# Patient Record
Sex: FEMALE | Race: BLACK OR AFRICAN AMERICAN | NOT HISPANIC OR LATINO | Employment: FULL TIME | URBAN - METROPOLITAN AREA
[De-identification: names, ages, dates, MRNs, and addresses within clinical notes are randomized per-mention and may not be internally consistent; named-entity substitution may affect disease eponyms.]

---
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
## 2018-05-01 ENCOUNTER — RADIANT APPOINTMENT (OUTPATIENT)
Dept: GENERAL RADIOLOGY | Facility: CLINIC | Age: 18
End: 2018-05-01
Attending: PEDIATRICS
Payer: COMMERCIAL

## 2018-05-01 ENCOUNTER — OFFICE VISIT (OUTPATIENT)
Dept: FAMILY MEDICINE | Facility: CLINIC | Age: 18
End: 2018-05-01
Payer: COMMERCIAL

## 2018-05-01 ENCOUNTER — PATIENT OUTREACH (OUTPATIENT)
Dept: FAMILY MEDICINE | Facility: CLINIC | Age: 18
End: 2018-05-01

## 2018-05-01 VITALS
HEART RATE: 89 BPM | TEMPERATURE: 99 F | HEIGHT: 63 IN | BODY MASS INDEX: 23.53 KG/M2 | WEIGHT: 132.8 LBS | OXYGEN SATURATION: 99 % | SYSTOLIC BLOOD PRESSURE: 137 MMHG | DIASTOLIC BLOOD PRESSURE: 78 MMHG

## 2018-05-01 DIAGNOSIS — R10.84 ABDOMINAL PAIN, GENERALIZED: Primary | ICD-10-CM

## 2018-05-01 DIAGNOSIS — R10.84 ABDOMINAL PAIN, GENERALIZED: ICD-10-CM

## 2018-05-01 DIAGNOSIS — N30.01 ACUTE CYSTITIS WITH HEMATURIA: ICD-10-CM

## 2018-05-01 DIAGNOSIS — R82.90 ABNORMAL URINE FINDINGS: ICD-10-CM

## 2018-05-01 DIAGNOSIS — K59.00 CONSTIPATION, UNSPECIFIED CONSTIPATION TYPE: ICD-10-CM

## 2018-05-01 LAB
ALBUMIN UR-MCNC: 100 MG/DL
AMORPH CRY #/AREA URNS HPF: ABNORMAL /HPF
APPEARANCE UR: ABNORMAL
BACTERIA #/AREA URNS HPF: ABNORMAL /HPF
BETA HCG QUAL IFA URINE: NEGATIVE
BILIRUB UR QL STRIP: NEGATIVE
COLOR UR AUTO: YELLOW
GLUCOSE UR STRIP-MCNC: NEGATIVE MG/DL
HGB UR QL STRIP: ABNORMAL
KETONES UR STRIP-MCNC: NEGATIVE MG/DL
LEUKOCYTE ESTERASE UR QL STRIP: ABNORMAL
NITRATE UR QL: NEGATIVE
NON-SQ EPI CELLS #/AREA URNS LPF: ABNORMAL /LPF
PH UR STRIP: 8 PH (ref 5–7)
RBC #/AREA URNS AUTO: ABNORMAL /HPF
SOURCE: ABNORMAL
SP GR UR STRIP: 1.02 (ref 1–1.03)
TSH SERPL DL<=0.005 MIU/L-ACNC: 0.67 MU/L (ref 0.4–4)
UROBILINOGEN UR STRIP-ACNC: 1 EU/DL (ref 0.2–1)
WBC #/AREA URNS AUTO: ABNORMAL /HPF

## 2018-05-01 PROCEDURE — 84443 ASSAY THYROID STIM HORMONE: CPT | Performed by: PEDIATRICS

## 2018-05-01 PROCEDURE — 84703 CHORIONIC GONADOTROPIN ASSAY: CPT | Performed by: PEDIATRICS

## 2018-05-01 PROCEDURE — 81001 URINALYSIS AUTO W/SCOPE: CPT | Performed by: PEDIATRICS

## 2018-05-01 PROCEDURE — 74019 RADEX ABDOMEN 2 VIEWS: CPT | Mod: FY

## 2018-05-01 PROCEDURE — 99203 OFFICE O/P NEW LOW 30 MIN: CPT | Performed by: PEDIATRICS

## 2018-05-01 PROCEDURE — 87186 SC STD MICRODIL/AGAR DIL: CPT | Performed by: PEDIATRICS

## 2018-05-01 PROCEDURE — 87088 URINE BACTERIA CULTURE: CPT | Performed by: PEDIATRICS

## 2018-05-01 PROCEDURE — 36415 COLL VENOUS BLD VENIPUNCTURE: CPT | Performed by: PEDIATRICS

## 2018-05-01 PROCEDURE — 87086 URINE CULTURE/COLONY COUNT: CPT | Performed by: PEDIATRICS

## 2018-05-01 RX ORDER — CEPHALEXIN 500 MG/1
500 CAPSULE ORAL 2 TIMES DAILY
Qty: 20 CAPSULE | Refills: 0 | Status: SHIPPED | OUTPATIENT
Start: 2018-05-01 | End: 2018-07-31

## 2018-05-01 RX ORDER — POLYETHYLENE GLYCOL 3350 17 G/17G
1 POWDER, FOR SOLUTION ORAL DAILY
Qty: 510 G | Refills: 1 | Status: SHIPPED | OUTPATIENT
Start: 2018-05-01 | End: 2018-07-31

## 2018-05-01 ASSESSMENT — PAIN SCALES - GENERAL: PAINLEVEL: MODERATE PAIN (5)
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
## 2018-05-01 NOTE — PATIENT INSTRUCTIONS
At [facility], we strive to deliver an exceptional experience to you, every time we see you.  If you receive a survey in the mail, please send us back your thoughts. We really do value your feedback.    Based on your medical history, these are the current health maintenance/preventive care services that you are due for (some may have been done at this visit.)  Health Maintenance Due   Topic Date Due     HIV SCREEN (SYSTEM ASSIGNED)  02/14/2016       Suggested websites for health information:  Www.Hyper Wear.org : Up to date and easily searchable information on multiple topics.  Www.Kamelio.gov : medication info, interactive tutorials, watch real surgeries online  Www.familydoctor.org : good info from the Academy of Family Physicians  Www.cdc.gov : public health info, travel advisories, epidemics (H1N1)  Www.aap.org : children's health info, normal development, vaccinations  Www.health.[facility].us : [place] dept of health, public health issues in [place], N1N1    Your care team:                            Family Medicine Internal Medicine   MD Randall Espino MD Shantel Branch-Fleming, MD Katya Georgiev PA-C Megan Hill, APRN CNP    Berry Burns MD Pediatrics   Kike Mojica, PAFernandoC  Gogo Aguilar, CNP MD Xiomy Pahn APRN CNP   MD Linda Matta MD Deborah Mielke, MD Kim Thein, APRN CNP      Clinic hours: Monday - Thursday 7 am-7 pm; Fridays 7 am-5 pm.   Urgent care: Monday - Friday 11 am-9 pm; Saturday and Sunday 9 am-5 pm.  Pharmacy : Monday -Thursday 8 am-8 pm; Friday 8 am-6 pm; Saturday and Sunday 9 am-5 pm.     Clinic: [phone]   Pharmacy: [phone]      Constipation (Adult)  Constipation means that you have bowel movements that are less frequent than usual. Stools often become very hard and difficult to pass.  Constipation is very common. At some point in life it affects almost everyone. Since everyone's bowel habits are different,  what is constipation to one person may not be to another. Your healthcare provider may do tests to diagnose constipation. It depends on what he or she finds when evaluating you.    Symptoms of constipation include:    Abdominal pain    Bloating    Vomiting    Painful bowel movements    Itching, swelling, bleeding, or pain around the anus  Causes  Constipation can have many causes. These include:    Diet low in fiber    Too much dairy    Not drinking enough liquids    Lack of exercise or physical activity. This is especially true for older adults.    Changes in lifestyle or daily routine, including pregnancy, aging, work, and travel    Frequent use or misuse of laxatives    Ignoring the urge to have a bowel movement or delaying it until later    Medicines, such as certain prescription pain medicines, iron supplements, antacids, certain antidepressants, and calcium supplements    Diseases like irritable bowel syndrome, bowel obstructions, stroke, diabetes, thyroid disease, Parkinson disease, hemorrhoids, and colon cancer  Complications  Potential complications of constipation can include:    Hemorrhoids    Rectal bleeding from hemorrhoids or anal fissures (skin tears)    Hernias    Dependency on laxatives    Chronic constipation    Fecal impaction    Bowel obstruction or perforation  Home care  All treatment should be done after talking with your healthcare provider. This is especially true if you have another medical problems, are taking prescription medicines, or are an older adult. Treatment most often involves lifestyle changes. You may also need medicines. Your healthcare provider will tell you which will work best for you. Follow the advice below to help avoid this problem in the future.  Lifestyle changes  These lifestyle changes can help prevent constipation:    Diet. Eat a high-fiber diet, with fresh fruit and vegetables, and reduce dairy intake, meats, and processed foods    Fluids. It's important to get  enough fluids each day. Drink plenty of water when you eat more fiber. If you are on diet that limits the amount of fluid you can have, talk about this with your healthcare provider.    Regular exercise. Check with your healthcare provider first.  Medicines  Take any medicines as directed. Some laxatives are safe to use only every now and then. Others can be taken on a regular basis. Talk with your doctor or pharmacist if you have questions.  Prescription pain medicines can cause constipation. If you are taking this kind of medicine, ask your healthcare provider if you should also take a stool softener.  Medicines you may take to treat constipation include:    Fiber supplements    Stool softeners    Laxatives    Enemas    Rectal suppositories  Follow-up care  Follow up with your healthcare provider if symptoms don't get better in the next few days. You may need to have more tests or see a specialist.  Call 911  Call 911 if any of these occur:    Trouble breathing    Stiff, rigid abdomen that is severely painful to touch    Confusion    Fainting or loss of consciousness    Rapid heart rate    Chest pain  When to seek medical advice  Call your healthcare provider right away if any of these occur:    Fever of 100.4 F (38 C) or higher, or as directed by your healthcare provider    Failure to resume normal bowel movements    Pain in your abdomen or back gets worse    Nausea or vomiting    Swelling in your abdomen    Blood in the stool    Black, tarry stool    Involuntary weight loss    Weakness  Date Last Reviewed: 12/30/2015 2000-2017 [employer]. [address] [place] [address]. All rights reserved. This information is not intended as a substitute for professional medical care. Always follow your healthcare professional's instructions.        Understanding Urinary Tract Infections (UTIs)  Most UTIs are caused by bacteria, although they may also be caused by viruses or fungi. Bacteria from the  bowel are the most common source of infection. The infection may start because of any of the following:    Sexual activity. During sex, bacteria can travel from the penis, vagina, or rectum into the urethra.     Bacteria on the skin outside the rectum may travel into the urethra. This is more common in women since the rectum and urethra are closer to each other than in men. Wiping from front to back after using the toilet and keeping the area clean can help prevent germs from getting to the urethra.    Blockage of urine flow through the urinary tract. If urine sits too long, germs may start to grow out of control.      Parts of the urinary tract  The infection can occur in any part of the urinary tract.    The kidneys collect and store urine.    The ureters carry urine from the kidneys to the bladder.    The bladder holds urine until you are ready to let it out.    The urethra carries urine from the bladder out of the body. It is shorter in women, so bacteria can move through it more easily. The urethra is longer in men, so a UTI is less likely to reach the bladder or kidneys in men.  Date Last Reviewed: 1/1/2017 2000-2017 [employer]. [address] [place] [address]. All rights reserved. This information is not intended as a substitute for professional medical care. Always follow your healthcare professional's instructions.

## 2018-05-01 NOTE — LETTER
Miranda CARE COORDINATION  [facility]  [address] [place] [address]    May 1, 2018    Cindy Xie  [address]   [place] [address]      Dear Cindy,    I am a clinic care coordinator who works with  Dr.Paula Marshall MD at the [facility]. I wanted to thank you for spending the time to talk with me. When you were in the clinic.  I wanted to provide you with my contact information so that you can call me with questions or concerns about your health care. Below is a description of clinic care coordination and how I can further assist you.     The clinic care coordinator is a registered nurse and/or  who understand the health care system. The goal of clinic care coordination is to help you manage your health and improve access to the [facility] system in the most efficient manner. The registered nurse can assist you in meeting your health care goals by providing education, coordinating services, and strengthening the communication among your providers. The  can assist you with financial, behavioral, psychosocial, chemical dependency, counseling, and/or psychiatric resources.    Please feel free to contact me at [phone], with any questions or concerns. We at [facility] are focused on providing you with the highest-quality healthcare experience possible and that all starts with you.     Sincerely,     Kimberly Rice [facility]  , Clinic Care Coordination  Clinics:  [facility] Rogers, [place]  [phone]   5/1/2018   1:58 PM    Enclosed: I have enclosed a copy of a 24 Hour Access Plan. This has helpful phone numbers for you to call when needed. Please keep this in an easy to access place to use as needed.

## 2018-05-01 NOTE — LETTER
Health Care Home - Access Care Plan    About Me  Patient Name:  Cindy Conroy    YOB: 2000  Age:                             18 year old   Sasha MRN:            [id number] Telephone Information:     Home Phone [phone]   Mobile [phone]       Address:    [address]   [place] [address] Email address:  No e-mail address on record      Emergency Contact(s)  Name Relationship Lgl Grd Work Phone Home Phone Mobile Phone   1. SHANE CONROY Mother   [phone]   2. NONE PER MOTHER Other   [phone]              Health Maintenance: Routine Health maintenance Reviewed: Due/Overdue     My Access Plan  Medical Emergency 911   Questions or concerns during clinic hours Primary Clinic Line, I will call the clinic directly: [facility] - [phone]   24 Hour Appointment Line [phone] or  [phone] [phone]) (toll free)   24 Hour Nurse Line [phone] (toll free)   Questions or concerns outside clinic hours 24 Hour Appointment Line, I will call the after-hours on-call line:   [facility] [phone] or [phone] ([phone]) (toll-free)   Preferred Urgent Care     Preferred Hospital     Preferred Pharmacy [facility] [address] [place], [place] - [address] [facility] [facility] [address] [facility]     Behavioral Health Crisis Line The National Suicide Prevention Lifeline at 1-924.843.1426 or 148     My Care Team Members  Patient Care Team       Relationship Specialty Notifications Start End    Branch Marjorie Duff MD PCP - General Family Practice  9/24/10     Phone: [phone] Fax: [phone]         [address] [place] [facility] [address]    Cindy Rice LSW (Peggy Johnson (LSW) Clinic Care Coordinator Primary Care - CC  5/1/18     Phone: [phone] Fax: [phone]               My Medical and Care Information  Problem List   Patient Active Problem List   Diagnosis     Health  Care Home     Suicide attempt using analgesics (H)     Family history of liver failure      Current Medications and Allergies:  See printed Medication Report
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
## 2018-05-01 NOTE — MR AVS SNAPSHOT
After Visit Summary   5/1/2018    Cindy Xie    MRN: [id number]           Patient Information     Date Of Birth          2000        Visit Information        Provider Department      5/1/2018 10:20 AM Maryam Olivares MD [facility]        Today's Diagnoses     Abdominal pain, generalized    -  1    Abnormal urine findings        Acute cystitis with hematuria        Constipation, unspecified constipation type          Care Instructions    At [facility], we strive to deliver an exceptional experience to you, every time we see you.  If you receive a survey in the mail, please send us back your thoughts. We really do value your feedback.    Based on your medical history, these are the current health maintenance/preventive care services that you are due for (some may have been done at this visit.)  Health Maintenance Due   Topic Date Due     HIV SCREEN (SYSTEM ASSIGNED)  02/14/2016       Suggested websites for health information:  WwwTangerine Power : Up to date and easily searchable information on multiple topics.  Www.medlineplus.gov : medication info, interactive tutorials, watch real surgeries online  Www.familydoctor.org : good info from the Academy of Family Physicians  Www.cdc.gov : public health info, travel advisories, epidemics (H1N1)  Www.aap.org : children's health info, normal development, vaccinations  Www.health.state.[place].us : [place] dept of health, public health issues in [place], N1N1    Your care team:                            Family Medicine Internal Medicine   MD Randall Espino MD Shantel Branch-Fleming, MD Katya Georgiev PA-C Megan Hill, LEEANN Burns MD Pediatrics   KENISHA Royal, MD Xiomy Chambers APRN CNP   MD Linda Matta MD Deborah Mielke, MD Kim Thein, APRN [facility]      Clinic hours: Monday - Thursday 7 am-7 pm; Fridays 7 am-5 pm.   Urgent care: Monday -  Friday 11 am-9 pm; Saturday and Sunday 9 am-5 pm.  Pharmacy : Monday -Thursday 8 am-8 pm; Friday 8 am-6 pm; Saturday and Sunday 9 am-5 pm.     Clinic: [phone]   Pharmacy: [phone]      Constipation (Adult)  Constipation means that you have bowel movements that are less frequent than usual. Stools often become very hard and difficult to pass.  Constipation is very common. At some point in life it affects almost everyone. Since everyone's bowel habits are different, what is constipation to one person may not be to another. Your healthcare provider may do tests to diagnose constipation. It depends on what he or she finds when evaluating you.    Symptoms of constipation include:    Abdominal pain    Bloating    Vomiting    Painful bowel movements    Itching, swelling, bleeding, or pain around the anus  Causes  Constipation can have many causes. These include:    Diet low in fiber    Too much dairy    Not drinking enough liquids    Lack of exercise or physical activity. This is especially true for older adults.    Changes in lifestyle or daily routine, including pregnancy, aging, work, and travel    Frequent use or misuse of laxatives    Ignoring the urge to have a bowel movement or delaying it until later    Medicines, such as certain prescription pain medicines, iron supplements, antacids, certain antidepressants, and calcium supplements    Diseases like irritable bowel syndrome, bowel obstructions, stroke, diabetes, thyroid disease, Parkinson disease, hemorrhoids, and colon cancer  Complications  Potential complications of constipation can include:    Hemorrhoids    Rectal bleeding from hemorrhoids or anal fissures (skin tears)    Hernias    Dependency on laxatives    Chronic constipation    Fecal impaction    Bowel obstruction or perforation  Home care  All treatment should be done after talking with your healthcare provider. This is especially true if you have another medical problems, are taking  prescription medicines, or are an older adult. Treatment most often involves lifestyle changes. You may also need medicines. Your healthcare provider will tell you which will work best for you. Follow the advice below to help avoid this problem in the future.  Lifestyle changes  These lifestyle changes can help prevent constipation:    Diet. Eat a high-fiber diet, with fresh fruit and vegetables, and reduce dairy intake, meats, and processed foods    Fluids. It's important to get enough fluids each day. Drink plenty of water when you eat more fiber. If you are on diet that limits the amount of fluid you can have, talk about this with your healthcare provider.    Regular exercise. Check with your healthcare provider first.  Medicines  Take any medicines as directed. Some laxatives are safe to use only every now and then. Others can be taken on a regular basis. Talk with your doctor or pharmacist if you have questions.  Prescription pain medicines can cause constipation. If you are taking this kind of medicine, ask your healthcare provider if you should also take a stool softener.  Medicines you may take to treat constipation include:    Fiber supplements    Stool softeners    Laxatives    Enemas    Rectal suppositories  Follow-up care  Follow up with your healthcare provider if symptoms don't get better in the next few days. You may need to have more tests or see a specialist.  Call 911  Call 911 if any of these occur:    Trouble breathing    Stiff, rigid abdomen that is severely painful to touch    Confusion    Fainting or loss of consciousness    Rapid heart rate    Chest pain  When to seek medical advice  Call your healthcare provider right away if any of these occur:    Fever of 100.4 F (38 C) or higher, or as directed by your healthcare provider    Failure to resume normal bowel movements    Pain in your abdomen or back gets worse    Nausea or vomiting    Swelling in your abdomen    Blood in the stool    Black,  tarry stool    Involuntary weight loss    Weakness  Date Last Reviewed: 12/30/2015 2000-2017 [employer]. [address] [place] [address]. All rights reserved. This information is not intended as a substitute for professional medical care. Always follow your healthcare professional's instructions.        Understanding Urinary Tract Infections (UTIs)  Most UTIs are caused by bacteria, although they may also be caused by viruses or fungi. Bacteria from the bowel are the most common source of infection. The infection may start because of any of the following:    Sexual activity. During sex, bacteria can travel from the penis, vagina, or rectum into the urethra.     Bacteria on the skin outside the rectum may travel into the urethra. This is more common in women since the rectum and urethra are closer to each other than in men. Wiping from front to back after using the toilet and keeping the area clean can help prevent germs from getting to the urethra.    Blockage of urine flow through the urinary tract. If urine sits too long, germs may start to grow out of control.      Parts of the urinary tract  The infection can occur in any part of the urinary tract.    The kidneys collect and store urine.    The ureters carry urine from the kidneys to the bladder.    The bladder holds urine until you are ready to let it out.    The urethra carries urine from the bladder out of the body. It is shorter in women, so bacteria can move through it more easily. The urethra is longer in men, so a UTI is less likely to reach the bladder or kidneys in men.  Date Last Reviewed: 1/1/2017 2000-2017 [employer]. [address] [place] [address]. All rights reserved. This information is not intended as a substitute for professional medical care. Always follow your healthcare professional's instructions.                Follow-ups after your visit        Who to contact     If you have  "questions or need follow up information about today's clinic visit or your schedule please contact [facility] MARIZA DEJESUS directly at [phone].  Normal or non-critical lab and imaging results will be communicated to you by MyChart, letter or phone within 4 business days after the clinic has received the results. If you do not hear from us within 7 days, please contact the clinic through [employer] or phone. If you have a critical or abnormal lab result, we will notify you by phone as soon as possible.  Submit refill requests through [employer] or call your pharmacy and they will forward the refill request to us. Please allow 3 business days for your refill to be completed.          Additional Information About Your Visit        [employer] Information     [employer] lets you send messages to your doctor, view your test results, renew your prescriptions, schedule appointments and more. To sign up, go to [email or web address] . Click on \"Log in\" on the left side of the screen, which will take you to the Welcome page. Then click on \"Sign up Now\" on the right side of the page.     You will be asked to enter the access code listed below, as well as some personal information. Please follow the directions to create your username and password.     Your access code is: [email or web address]  Expires: 2018 11:33 AM     Your access code will  in 90 days. If you need help or a new code, please call your [facility] clinic or [phone].        Care EveryWhere ID     This is your Care EveryWhere ID. This could be used by other organizations to access your [facility] medical records  [id number]        Your Vitals Were     Pulse Temperature Height Pulse Oximetry BMI (Body Mass Index)       89 99  F (37.2  C) (Oral) 5' 3.39\" (1.61 m) 99% 23.24 kg/m2        Blood Pressure from Last 3 Encounters:   18 137/78   16 100/68   14 121/77    Weight from Last 3 Encounters:   18 132 lb 12.8 oz (60.2 kg)   16 " 136 lb (61.7 kg) (67 %)*   08/19/14 131 lb 6.4 oz (59.6 kg) (69 %)*     * Growth percentiles are based on CDC 2-20 Years data.              We Performed the Following     Beta HCG Qual, Urine - [facility] and [place] ([id number])     TSH with free T4 reflex     UA with Microscopic reflex to Culture     Urine Culture Aerobic Bacterial          Today's Medication Changes          These changes are accurate as of 5/1/18 11:33 AM.  If you have any questions, ask your nurse or doctor.               Start taking these medicines.        Dose/Directions    cephALEXin 500 MG capsule   Commonly known as:  KEFLEX   Used for:  Acute cystitis with hematuria   Started by:  Maryam Olivares MD        Dose:  500 mg   Take 1 capsule (500 mg) by mouth 2 times daily   Quantity:  20 capsule   Refills:  0       polyethylene glycol powder   Commonly known as:  MIRALAX   Used for:  Constipation, unspecified constipation type   Started by:  Maryam Olivares MD        Dose:  1 capful   Take 17 g (1 capful) by mouth daily   Quantity:  510 g   Refills:  1            Where to get your medicines      These medications were sent to [facility] [address] [facility] [address] [facility] [address] [facility]  [address] [facility] [address]    Hours:  24-hours Phone:  [phone]     cephALEXin 500 MG capsule    polyethylene glycol powder                Primary Care Provider Office Phone # Fax #    Marjorie Camargo Abdi Duff [phone]       [address] [facility] [address] [facility] [address]        Equal Access to Services     [facility]: Hadii aad ku hadasho Soomaali, waaxda luqadaha, qaybta kaalmada adeegyada, analilia idiin hayelisa zaragoza . So [facility] [phone].    ATENCIÓN: Si habla español, tiene a espinoza disposición servicios gratuitos de asistencia lingüística. Llame al [phone].    We comply with applicable federal civil rights laws and [place] laws. We do not discriminate on  the basis of race, color, national origin, age, disability, sex, sexual orientation, or gender identity.            Thank you!     Thank you for choosing [facility]  for your care. Our goal is always to provide you with excellent care. Hearing back from our patients is one way we can continue to improve our services. Please take a few minutes to complete the written survey that you may receive in the mail after your visit with us. Thank you!             Your Updated Medication List - Protect others around you: Learn how to safely use, store and throw away your medicines at www.disposemymeds.org.          This list is accurate as of 5/1/18 11:33 AM.  Always use your most recent med list.                   Brand Name Dispense Instructions for use Diagnosis    cephALEXin 500 MG capsule    KEFLEX    20 capsule    Take 1 capsule (500 mg) by mouth 2 times daily    Acute cystitis with hematuria       NEXPLANON SC      Per pt nexplanon was placed Feb 2017        NO ACTIVE MEDICATIONS           polyethylene glycol powder    MIRALAX    510 g    Take 17 g (1 capful) by mouth daily    Constipation, unspecified constipation type       triamcinolone 0.1 % cream    KENALOG    15 g    Apply sparingly to affected area three times daily for 14 days.    Dermatitis

## 2018-05-03 LAB
BACTERIA SPEC CULT: ABNORMAL
SPECIMEN SOURCE: ABNORMAL

## 2018-05-04 ENCOUNTER — TELEPHONE (OUTPATIENT)
Dept: FAMILY MEDICINE | Facility: CLINIC | Age: 18
End: 2018-05-04

## 2018-05-04 NOTE — TELEPHONE ENCOUNTER
Notes Recorded by Maryam Olivares MD on 5/3/2018 at 2:15 PM  Called to discuss UCx results  Left a message  If patient calls could you let her know result and ask if she is better please? If not any better will change to Cipro but since UCx showed sen to Oxacillin, Keflex should work ok. Will change only if not better clinically   thks

## 2018-06-01 ENCOUNTER — PATIENT OUTREACH (OUTPATIENT)
Dept: FAMILY MEDICINE | Facility: CLINIC | Age: 18
End: 2018-06-01
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
## 2018-06-04 ENCOUNTER — OFFICE VISIT (OUTPATIENT)
Dept: FAMILY MEDICINE | Facility: CLINIC | Age: 18
End: 2018-06-04
Payer: COMMERCIAL

## 2018-06-04 VITALS
DIASTOLIC BLOOD PRESSURE: 76 MMHG | RESPIRATION RATE: 16 BRPM | HEIGHT: 63 IN | WEIGHT: 132.5 LBS | BODY MASS INDEX: 23.48 KG/M2 | TEMPERATURE: 98.6 F | HEART RATE: 90 BPM | SYSTOLIC BLOOD PRESSURE: 122 MMHG | OXYGEN SATURATION: 98 %

## 2018-06-04 DIAGNOSIS — B37.31 CANDIDIASIS OF VULVA AND VAGINA: ICD-10-CM

## 2018-06-04 DIAGNOSIS — N30.00 ACUTE CYSTITIS WITHOUT HEMATURIA: Primary | ICD-10-CM

## 2018-06-04 LAB
ALBUMIN UR-MCNC: 100 MG/DL
APPEARANCE UR: ABNORMAL
BACTERIA #/AREA URNS HPF: ABNORMAL /HPF
BILIRUB UR QL STRIP: NEGATIVE
COLOR UR AUTO: YELLOW
GLUCOSE UR STRIP-MCNC: NEGATIVE MG/DL
HGB UR QL STRIP: ABNORMAL
KETONES UR STRIP-MCNC: NEGATIVE MG/DL
LEUKOCYTE ESTERASE UR QL STRIP: ABNORMAL
NITRATE UR QL: NEGATIVE
NON-SQ EPI CELLS #/AREA URNS LPF: ABNORMAL /LPF
PH UR STRIP: 6 PH (ref 5–7)
RBC #/AREA URNS AUTO: ABNORMAL /HPF
SOURCE: ABNORMAL
SP GR UR STRIP: >1.03 (ref 1–1.03)
SPECIMEN SOURCE: ABNORMAL
UROBILINOGEN UR STRIP-ACNC: 1 EU/DL (ref 0.2–1)
WBC #/AREA URNS AUTO: ABNORMAL /HPF
WET PREP SPEC: ABNORMAL

## 2018-06-04 PROCEDURE — 87491 CHLMYD TRACH DNA AMP PROBE: CPT | Performed by: PHYSICIAN ASSISTANT

## 2018-06-04 PROCEDURE — 87210 SMEAR WET MOUNT SALINE/INK: CPT | Performed by: PHYSICIAN ASSISTANT

## 2018-06-04 PROCEDURE — 81001 URINALYSIS AUTO W/SCOPE: CPT | Performed by: PHYSICIAN ASSISTANT

## 2018-06-04 PROCEDURE — 87591 N.GONORRHOEAE DNA AMP PROB: CPT | Performed by: PHYSICIAN ASSISTANT

## 2018-06-04 PROCEDURE — 99213 OFFICE O/P EST LOW 20 MIN: CPT | Performed by: PHYSICIAN ASSISTANT

## 2018-06-04 RX ORDER — FLUCONAZOLE 150 MG/1
150 TABLET ORAL
Qty: 2 TABLET | Refills: 0 | Status: SHIPPED | OUTPATIENT
Start: 2018-06-04 | End: 2018-07-31

## 2018-06-04 RX ORDER — SULFAMETHOXAZOLE/TRIMETHOPRIM 800-160 MG
1 TABLET ORAL 2 TIMES DAILY
Qty: 14 TABLET | Refills: 0 | Status: SHIPPED | OUTPATIENT
Start: 2018-06-04 | End: 2018-06-11

## 2018-06-04 ASSESSMENT — PAIN SCALES - GENERAL: PAINLEVEL: EXTREME PAIN (8)

## 2018-06-04 NOTE — PATIENT INSTRUCTIONS
Please take Bactrim 1 tablet twice a day for 7 days with large amount of water  Increase fluids  Follow up or call in 3 days if not better  Urine culture is pending  For prevention wipe front to back, urinate and wash after sex.     Diflucan 150 mg 1 tablet once, may repeat in 3 days as needed -for yeast    Understanding Urinary Tract Infections (UTIs)  Most UTIs are caused by bacteria, although they may also be caused by viruses or fungi. Bacteria from the bowel are the most common source of infection. The infection may start because of any of the following:    Sexual activity. During sex, bacteria can travel from the penis, vagina, or rectum into the urethra.     Bacteria on the skin outside the rectum may travel into the urethra. This is more common in women since the rectum and urethra are closer to each other than in men. Wiping from front to back after using the toilet and keeping the area clean can help prevent germs from getting to the urethra.    Blockage of urine flow through the urinary tract. If urine sits too long, germs may start to grow out of control.      Parts of the urinary tract  The infection can occur in any part of the urinary tract.    The kidneys collect and store urine.    The ureters carry urine from the kidneys to the bladder.    The bladder holds urine until you are ready to let it out.    The urethra carries urine from the bladder out of the body. It is shorter in women, so bacteria can move through it more easily. The urethra is longer in men, so a UTI is less likely to reach the bladder or kidneys in men.  Date Last Reviewed: 1/1/2017 2000-2017 [employer]. [address] [place] [address], [place] [address]. All rights reserved. This information is not intended as a substitute for professional medical care. Always follow your healthcare professional's instructions.        Yeast Infection (Candida Vaginal Infection)    You have a Candida vaginal infection. This is also known as  a yeast infection. It is most often caused by a type of yeast (fungus) called Candida. Candida are normally found in the vagina. But if they increase in number, this can lead to infection and cause symptoms.  Symptoms of a yeast infection can include:    Clumpy or thin, white discharge, which may look like cottage cheese    Itching or burning    Burning with urination  Certain factors can make a yeast infection more likely. These can include:    Taking certain medicines, such as antibiotics or birth control pills    Pregnancy    Diabetes    Weak immune system  A yeast infection is most often treated with antifungal medicine. This may be given as a vaginal cream or pills you take by mouth. Treatment may last for about 1 to 7 days. Women with severe or recurrent infections may need longer courses of treatment.  Home care    If you re prescribed medicine, be sure to use it as directed. Finish all of the medicine, even if your symptoms go away. Note: Don t try to treat yourself using over-the-counter products without talking to your provider first. He or she will let you know if this is a good option for you.    Ask your provider what steps you can take to help reduce your risk of having a yeast infection in the future.  Follow-up care  Follow up with your healthcare provider, or as directed.  When to seek medical advice  Call your healthcare provider right away if:    You have a fever of 100.4 F (38 C) or higher, or as directed by your provider.    Your symptoms worsen, or they don t go away within a few days of starting treatment.    You have new pain in the lower belly or pelvic region.    You have side effects that bother you or a reaction to the cream or pills you re prescribed.    You or any partners you have sex with have new symptoms, such as a rash, joint pain, or sores.  Date Last Reviewed: 10/1/2017    1531-9910 [employer]. [address] [place] [address], [place] [address]. All rights reserved. This  information is not intended as a substitute for professional medical care. Always follow your healthcare professional's instructions.

## 2018-06-04 NOTE — MR AVS SNAPSHOT
After Visit Summary   6/4/2018    Cidny Xie    MRN: [id number]           Patient Information     Date Of Birth          2000        Visit Information        Provider Department      6/4/2018 10:20 AM Shirley Barreto PA-C [facility]        Today's Diagnoses     Dysuria    -  1    Acute cystitis without hematuria        Candidiasis of vulva and vagina          Care Instructions    Please take Bactrim 1 tablet twice a day for 7 days with large amount of water  Increase fluids  Follow up or call in 3 days if not better  Urine culture is pending  For prevention wipe front to back, urinate and wash after sex.     Diflucan 150 mg 1 tablet once, may repeat in 3 days as needed -for yeast    Understanding Urinary Tract Infections (UTIs)  Most UTIs are caused by bacteria, although they may also be caused by viruses or fungi. Bacteria from the bowel are the most common source of infection. The infection may start because of any of the following:    Sexual activity. During sex, bacteria can travel from the penis, vagina, or rectum into the urethra.     Bacteria on the skin outside the rectum may travel into the urethra. This is more common in women since the rectum and urethra are closer to each other than in men. Wiping from front to back after using the toilet and keeping the area clean can help prevent germs from getting to the urethra.    Blockage of urine flow through the urinary tract. If urine sits too long, germs may start to grow out of control.      Parts of the urinary tract  The infection can occur in any part of the urinary tract.    The kidneys collect and store urine.    The ureters carry urine from the kidneys to the bladder.    The bladder holds urine until you are ready to let it out.    The urethra carries urine from the bladder out of the body. It is shorter in women, so bacteria can move through it more easily. The urethra is longer in men, so a  UTI is less likely to reach the bladder or kidneys in men.  Date Last Reviewed: 1/1/2017 2000-2017 [employer]. [address] [place] [address], [place] [address]. All rights reserved. This information is not intended as a substitute for professional medical care. Always follow your healthcare professional's instructions.        Yeast Infection (Candida Vaginal Infection)    You have a Candida vaginal infection. This is also known as a yeast infection. It is most often caused by a type of yeast (fungus) called Candida. Candida are normally found in the vagina. But if they increase in number, this can lead to infection and cause symptoms.  Symptoms of a yeast infection can include:    Clumpy or thin, white discharge, which may look like cottage cheese    Itching or burning    Burning with urination  Certain factors can make a yeast infection more likely. These can include:    Taking certain medicines, such as antibiotics or birth control pills    Pregnancy    Diabetes    Weak immune system  A yeast infection is most often treated with antifungal medicine. This may be given as a vaginal cream or pills you take by mouth. Treatment may last for about 1 to 7 days. Women with severe or recurrent infections may need longer courses of treatment.  Home care    If you re prescribed medicine, be sure to use it as directed. Finish all of the medicine, even if your symptoms go away. Note: Don t try to treat yourself using over-the-counter products without talking to your provider first. He or she will let you know if this is a good option for you.    Ask your provider what steps you can take to help reduce your risk of having a yeast infection in the future.  Follow-up care  Follow up with your healthcare provider, or as directed.  When to seek medical advice  Call your healthcare provider right away if:    You have a fever of 100.4 F (38 C) or higher, or as directed by your provider.    Your symptoms worsen, or they  "don t go away within a few days of starting treatment.    You have new pain in the lower belly or pelvic region.    You have side effects that bother you or a reaction to the cream or pills you re prescribed.    You or any partners you have sex with have new symptoms, such as a rash, joint pain, or sores.  Date Last Reviewed: 10/1/2017    1329-3093 [employer]. [address] [place] [address]. All rights reserved. This information is not intended as a substitute for professional medical care. Always follow your healthcare professional's instructions.                Follow-ups after your visit        Who to contact     If you have questions or need follow up information about today's clinic visit or your schedule please contact [facility] directly at [phone].  Normal or non-critical lab and imaging results will be communicated to you by [employer], letter or phone within 4 business days after the clinic has received the results. If you do not hear from us within 7 days, please contact the clinic through [employer] or phone. If you have a critical or abnormal lab result, we will notify you by phone as soon as possible.  Submit refill requests through [employer] or call your pharmacy and they will forward the refill request to us. Please allow 3 business days for your refill to be completed.          Additional Information About Your Visit        [employer] Information     [employer] lets you send messages to your doctor, view your test results, renew your prescriptions, schedule appointments and more. To sign up, go to [email or web address] . Click on \"Log in\" on the left side of the screen, which will take you to the Welcome page. Then click on \"Sign up Now\" on the right side of the page.     You will be asked to enter the access code listed below, as well as some personal information. Please follow the directions to create your username and password.     Your access code is: " [email or web address]  Expires: 2018 10:26 AM     Your access code will  in 90 days. If you need help or a new code, please call your [place] clinic or [phone].        Care EveryWhere ID     This is your Care EveryWhere ID. This could be used by other organizations to access your [place] medical records  [id number]        Your Vitals Were     Pulse Temperature Respirations Height Pulse Oximetry       90 98.6  F (37  C) (Oral) 16 5' 3.39\" (1.61 m) 98%     BMI (Body Mass Index)                   23.18 kg/m2            Blood Pressure from Last 3 Encounters:   18 122/76   18 137/78   16 100/68    Weight from Last 3 Encounters:   18 132 lb 8 oz (60.1 kg)   18 132 lb 12.8 oz (60.2 kg)   16 136 lb (61.7 kg) (67 %)*     * Growth percentiles are based on [facility] 2-20 Years data.              We Performed the Following     CHLAMYDIA TRACHOMATIS PCR     NEISSERIA GONORRHOEA PCR     UA with Microscopic     Wet prep          Today's Medication Changes          These changes are accurate as of 18 11:19 AM.  If you have any questions, ask your nurse or doctor.               Start taking these medicines.        Dose/Directions    fluconazole 150 MG tablet   Commonly known as:  DIFLUCAN   Used for:  Candidiasis of vulva and vagina   Started by:  Shirley Barreto PA-C        Dose:  150 mg   Take 1 tablet (150 mg) by mouth every 3 days   Quantity:  2 tablet   Refills:  0       sulfamethoxazole-trimethoprim 800-160 MG per tablet   Commonly known as:  BACTRIM DS/SEPTRA DS   Used for:  Acute cystitis without hematuria   Started by:  Shirley Barreto PA-C        Dose:  1 tablet   Take 1 tablet by mouth 2 times daily for 7 days   Quantity:  14 tablet   Refills:  0            Where to get your medicines      These medications were sent to [facility] - [place], [place] - [address]  [address] " [place] [address]    Hours:  24-hours Phone:  [phone]     fluconazole 150 MG tablet    sulfamethoxazole-trimethoprim 800-160 MG per tablet                Primary Care Provider Office Phone # Fax #    Marjorie Mary Jo Duff [phone] [address] [facility] [address]        Equal Access to Services     [facility]: Hadii aad ku hadasho Soomaali, waaxda luqadaha, qaybta kaalmada adeegyada, waxay idiin hayaan adeeg kharash la'aan ah. So [facility] [phone].    ATENCIÓN: Si habla español, tiene a espinoza disposición servicios gratuitos de asistencia lingüística. Llame al [phone].    We comply with applicable federal civil rights laws and [place] laws. We do not discriminate on the basis of race, color, national origin, age, disability, sex, sexual orientation, or gender identity.            Thank you!     Thank you for choosing [facility]  for your care. Our goal is always to provide you with excellent care. Hearing back from our patients is one way we can continue to improve our services. Please take a few minutes to complete the written survey that you may receive in the mail after your visit with us. Thank you!             Your Updated Medication List - Protect others around you: Learn how to safely use, store and throw away your medicines at www.disposemymeds.org.          This list is accurate as of 6/4/18 11:19 AM.  Always use your most recent med list.                   Brand Name Dispense Instructions for use Diagnosis    cephALEXin 500 MG capsule    KEFLEX    20 capsule    Take 1 capsule (500 mg) by mouth 2 times daily    Acute cystitis with hematuria       fluconazole 150 MG tablet    DIFLUCAN    2 tablet    Take 1 tablet (150 mg) by mouth every 3 days    Candidiasis of vulva and vagina       NEXPLANON SC      Per pt nexplanon was placed Feb 2017        NO ACTIVE MEDICATIONS           polyethylene glycol powder    MIRALAX    510 g    Take 17 g  (1 capful) by mouth daily    Constipation, unspecified constipation type       sulfamethoxazole-trimethoprim 800-160 MG per tablet    BACTRIM DS/SEPTRA DS    14 tablet    Take 1 tablet by mouth 2 times daily for 7 days    Acute cystitis without hematuria       triamcinolone 0.1 % cream    KENALOG    15 g    Apply sparingly to affected area three times daily for 14 days.    Dermatitis

## 2018-06-04 NOTE — PROGRESS NOTES
SUBJECTIVE:   Cindy Xie is a 18 year old female who presents to clinic today for the following health issues:    URINARY TRACT SYMPTOMS  Onset: Thursday     Description:   Painful urination (Dysuria): YES  Blood in urine (Hematuria): no, but patient started vaginal spotting, she is on Nexplanon   Delay in urine (Hesitency): no     Intensity: 8/10    Progression of Symptoms:  worsening    Accompanying Signs & Symptoms:  Fever/chills: no   Flank pain no   Nausea and vomiting: no   Any vaginal symptoms: abnormal vaginal bleeding and vaginal itching  Abdominal/Pelvic Pain: YES    History:   History of frequent UTI's: YES  History of kidney stones: no   Sexually Active: YES  Possibility of pregnancy: No    Therapies Tried and outcome: Vagisal, ACV ; No relief        Problem list and histories reviewed & adjusted, as indicated.  Additional history: as documented    Patient Active Problem List   Diagnosis     Health Care Home     Suicide attempt using analgesics (H)     Family history of liver failure     History reviewed. No pertinent surgical history.    Social History   Substance Use Topics     Smoking status: Never Smoker     Smokeless tobacco: Never Used     Alcohol use No     Family History   Problem Relation Age of Onset     Liver Disease Mother      Liver transplant      Asthma Brother      Asthma Brother          Current Outpatient Prescriptions   Medication Sig Dispense Refill     cephALEXin (KEFLEX) 500 MG capsule Take 1 capsule (500 mg) by mouth 2 times daily 20 capsule 0     Etonogestrel (NEXPLANON SC) Per pt nexplanon was placed Feb 2017       fluconazole (DIFLUCAN) 150 MG tablet Take 1 tablet (150 mg) by mouth every 3 days 2 tablet 0     NO ACTIVE MEDICATIONS        polyethylene glycol (MIRALAX) powder Take 17 g (1 capful) by mouth daily 510 g 1     sulfamethoxazole-trimethoprim (BACTRIM DS/SEPTRA DS) 800-160 MG per tablet Take 1 tablet by mouth 2 times daily for 7 days 14 tablet 0     triamcinolone  "(KENALOG) 0.1 % cream Apply sparingly to affected area three times daily for 14 days. 15 g 0     No Known Allergies    Reviewed and updated as needed this visit by clinical staff  Tobacco  Allergies  Meds  Problems  Med Hx  Surg Hx  Fam Hx  Soc Hx        Reviewed and updated as needed this visit by Provider  Allergies  Meds  Problems         ROS:  Constitutional, HEENT, cardiovascular, pulmonary, GI, , musculoskeletal, neuro, skin, endocrine and psych systems are negative, except as otherwise noted.    OBJECTIVE:     /76 (BP Location: Right arm, Patient Position: Sitting, Cuff Size: Adult Regular)  Pulse 90  Temp 98.6  F (37  C) (Oral)  Resp 16  Ht 5' 3.39\" (1.61 m)  Wt 132 lb 8 oz (60.1 kg)  LMP   SpO2 98%  BMI 23.18 kg/m2  Body mass index is 23.18 kg/(m^2).  GENERAL: healthy, alert and no distress  NECK: no adenopathy, no asymmetry, masses, or scars and thyroid normal to palpation  RESP: lungs clear to auscultation - no rales, rhonchi or wheezes  CV: regular rate and rhythm, normal S1 S2, no S3 or S4, no murmur, click or rub, no peripheral edema and peripheral pulses strong  ABDOMEN: soft, nontender, no hepatosplenomegaly, no masses and bowel sounds normal  MS: no gross musculoskeletal defects noted, no edema  BACK: no CVA tenderness, no paralumbar tenderness    Diagnostic Test Results:  Results for orders placed or performed in visit on 06/04/18 (from the past 24 hour(s))   Wet prep   Result Value Ref Range    Specimen Description Vagina     Wet Prep Yeast seen (A)     Wet Prep No clue cells seen     Wet Prep No Trichomonas seen    UA with Microscopic   Result Value Ref Range    Color Urine Yellow     Appearance Urine Cloudy     Glucose Urine Negative NEG^Negative mg/dL    Bilirubin Urine Negative NEG^Negative    Ketones Urine Negative NEG^Negative mg/dL    Specific Gravity Urine >1.030 1.003 - 1.035    pH Urine 6.0 5.0 - 7.0 pH    Protein Albumin Urine 100 (A) NEG^Negative mg/dL    " Urobilinogen Urine 1.0 0.2 - 1.0 EU/dL    Nitrite Urine Negative NEG^Negative    Blood Urine Large (A) NEG^Negative    Leukocyte Esterase Urine Moderate (A) NEG^Negative    Source Midstream Urine     WBC Urine  (A) OTO5^0 - 5 /HPF    RBC Urine 10-25 (A) OTO2^O - 2 /HPF    Squamous Epithelial /LPF Urine Many (A) FEW^Few /LPF    Bacteria Urine Moderate (A) NEG^Negative /HPF       ASSESSMENT/PLAN:         ICD-10-CM    1. Acute cystitis without hematuria N30.00 UA with Microscopic     NEISSERIA GONORRHOEA PCR     CHLAMYDIA TRACHOMATIS PCR     sulfamethoxazole-trimethoprim (BACTRIM DS/SEPTRA DS) 800-160 MG per tablet   2. Candidiasis of vulva and vagina B37.3 Wet prep     fluconazole (DIFLUCAN) 150 MG tablet     Please take Bactrim 1 tablet twice a day for 7 days with large amount of water  Increase fluids  Follow up or call in 3 days if not better  Urine culture is pending  For prevention wipe front to back, urinate and wash after sex.     Diflucan 150 mg 1 tablet once, may repeat in 3 days as needed -for yeast      Shirley Barreto PA-C  [facility]

## 2018-06-04 NOTE — LETTER
[address] [address]  Phone: [phone]    June 4, 2018        Cindy L Rojas  [address]    [facility] [address]          To whom it may concern:    RE: Cindy L Juliojaylyn    Patient was seen and treated today at our clinic and missed work 6/4/18    Please contact me for questions or concerns.      Sincerely,    Minerva Barreto PAC

## 2018-06-05 LAB
C TRACH DNA SPEC QL NAA+PROBE: NEGATIVE
N GONORRHOEA DNA SPEC QL NAA+PROBE: NEGATIVE
SPECIMEN SOURCE: NORMAL
SPECIMEN SOURCE: NORMAL

## 2018-06-11 ENCOUNTER — OFFICE VISIT (OUTPATIENT)
Dept: FAMILY MEDICINE | Facility: CLINIC | Age: 18
End: 2018-06-11
Payer: COMMERCIAL

## 2018-06-11 VITALS
OXYGEN SATURATION: 100 % | SYSTOLIC BLOOD PRESSURE: 120 MMHG | RESPIRATION RATE: 20 BRPM | HEART RATE: 82 BPM | HEIGHT: 63 IN | TEMPERATURE: 98.3 F | BODY MASS INDEX: 23.5 KG/M2 | WEIGHT: 132.6 LBS | DIASTOLIC BLOOD PRESSURE: 75 MMHG

## 2018-06-11 DIAGNOSIS — Z30.46 NEXPLANON REMOVAL: Primary | ICD-10-CM

## 2018-06-11 PROCEDURE — 99207 ZZC DROP WITH A PROCEDURE: CPT | Performed by: FAMILY MEDICINE

## 2018-06-11 PROCEDURE — 11982 REMOVE DRUG IMPLANT DEVICE: CPT | Performed by: FAMILY MEDICINE

## 2018-06-11 ASSESSMENT — PAIN SCALES - GENERAL: PAINLEVEL: NO PAIN (0)

## 2018-06-11 NOTE — MR AVS SNAPSHOT
"              After Visit Summary   2018    Cindy Xie    MRN: [id number]           Patient Information     Date Of Birth          2000        Visit Information        Provider Department      2018 2:20 PM Marjorie Green MD; MARIZA DEJESUS CIRC/VAS/FLEX ROOM [facility]        Today's Diagnoses     Nexplanon removal    -  1       Follow-ups after your visit        Who to contact     If you have questions or need follow up information about today's clinic visit or your schedule please contact [facility] directly at [phone].  Normal or non-critical lab and imaging results will be communicated to you by [employer]hart, letter or phone within 4 business days after the clinic has received the results. If you do not hear from us within 7 days, please contact the clinic through [employer]hart or phone. If you have a critical or abnormal lab result, we will notify you by phone as soon as possible.  Submit refill requests through [employer] or call your pharmacy and they will forward the refill request to us. Please allow 3 business days for your refill to be completed.          Additional Information About Your Visit        MyChart Information     [employer] lets you send messages to your doctor, view your test results, renew your prescriptions, schedule appointments and more. To sign up, go to [email or web address] . Click on \"Log in\" on the left side of the screen, which will take you to the Welcome page. Then click on \"Sign up Now\" on the right side of the page.     You will be asked to enter the access code listed below, as well as some personal information. Please follow the directions to create your username and password.     Your access code is: [email or web address]  Expires: 2018 10:26 AM     Your access code will  in 90 days. If you need help or a new code, please call your [facility] or [phone].        Care EveryWhere ID     This is your " "Care EveryWhere ID. This could be used by other organizations to access your [place] medical records  [id number]        Your Vitals Were     Pulse Temperature Respirations Height Pulse Oximetry Breastfeeding?    82 98.3  F (36.8  C) (Oral) 20 5' 3\" (1.6 m) 100% No    BMI (Body Mass Index)                   23.49 kg/m2            Blood Pressure from Last 3 Encounters:   06/11/18 120/75   06/04/18 122/76   05/01/18 137/78    Weight from Last 3 Encounters:   06/11/18 132 lb 9.6 oz (60.1 kg)   06/04/18 132 lb 8 oz (60.1 kg)   05/01/18 132 lb 12.8 oz (60.2 kg)              We Performed the Following     REMOVAL CordiaON        Primary Care Provider Office Phone # Fax #    Marjorie Camargo Abdi Duff [phone]       [address]  [facility] [address]        Equal Access to Services     [facility]: Hadii aad ku hadasho Soomaali, waaxda luqadaha, qaybta kaalmada adeegyada, waxay idiin hayaan adeeg kharahebert la'elisa . So [facility] [phone].    ATENCIÓN: Si habla español, tiene a espinoza disposición servicios gratuitos de asistencia lingüística. Ll[facility] [phone].    We comply with applicable federal civil rights laws and [place] laws. We do not discriminate on the basis of race, color, national origin, age, disability, sex, sexual orientation, or gender identity.            Thank you!     Thank you for choosing [facility]  for your care. Our goal is always to provide you with excellent care. Hearing back from our patients is one way we can continue to improve our services. Please take a few minutes to complete the written survey that you may receive in the mail after your visit with us. Thank you!             Your Updated Medication List - Protect others around you: Learn how to safely use, store and throw away your medicines at www.disposemymeds.org.          This list is accurate as of 6/11/18  3:06 PM.  Always use your most recent med list.                   Brand " Name Dispense Instructions for use Diagnosis    cephALEXin 500 MG capsule    KEFLEX    20 capsule    Take 1 capsule (500 mg) by mouth 2 times daily    Acute cystitis with hematuria       fluconazole 150 MG tablet    DIFLUCAN    2 tablet    Take 1 tablet (150 mg) by mouth every 3 days    Candidiasis of vulva and vagina       polyethylene glycol powder    MIRALAX    510 g    Take 17 g (1 capful) by mouth daily    Constipation, unspecified constipation type       sulfamethoxazole-trimethoprim 800-160 MG per tablet    BACTRIM DS/SEPTRA DS    14 tablet    Take 1 tablet by mouth 2 times daily for 7 days    Acute cystitis without hematuria       triamcinolone 0.1 % cream    KENALOG    15 g    Apply sparingly to affected area three times daily for 14 days.    Dermatitis

## 2018-06-11 NOTE — PROGRESS NOTES
"  SUBJECTIVE:   Cindy Xie is a 18 year old female who presents to clinic today for the following health issues:      Procedure-Nexaplanon Removal per patient request.  She states she doesn't feel it is best for her body.  No other contraception wanted today.    Problem list and histories reviewed & adjusted, as indicated.  Additional history: as documented    Patient Active Problem List   Diagnosis     Health Care Home     Suicide attempt using analgesics (H)     Family history of liver failure     No past surgical history on file.    Social History   Substance Use Topics     Smoking status: Never Smoker     Smokeless tobacco: Never Used     Alcohol use No     Family History   Problem Relation Age of Onset     Liver Disease Mother      Liver transplant      Asthma Brother      Asthma Brother            Reviewed and updated as needed this visit by clinical staff  Tobacco  Allergies  Meds  Problems       Reviewed and updated as needed this visit by Provider  Allergies  Meds  Problems         OBJECTIVE:     /75 (BP Location: Left arm, Patient Position: Chair, Cuff Size: Adult Regular)  Pulse 82  Temp 98.3  F (36.8  C) (Oral)  Resp 20  Ht 5' 3\" (1.6 m)  Wt 132 lb 9.6 oz (60.1 kg)  SpO2 100%  Breastfeeding? No  BMI 23.49 kg/m2  Body mass index is 23.49 kg/(m^2).  GENERAL: healthy, alert and no distress  SKIN: neplanon felt under skin on left arm    Diagnostic Test Results:  none     ASSESSMENT/PLAN:     1. Nexplanon removal  Removal of Nexplanon for birth control    Informed consent reviewed and obtained.  The patient is positioned with her LEFT arm flexed at the elbow.  Previous nexplanon insertion site identified. This area is cleansed with betadine and then injected with 1% lidocaine with epi.  A small stab incision is made at this previous site.  The nexplanon is guided into the incision and grasped with curved clamp and removed.  It was verified to be intact.  The incision is noted to be " hemostatic and the area is wrapped with a 4x4 and Coban.      There were no complications and minimal blood loss.    - REMOVAL NEXPLANON    Follow up for contraception if desired.    Marjorie Duff MD  [facility]

## 2018-07-31 ENCOUNTER — OFFICE VISIT (OUTPATIENT)
Dept: FAMILY MEDICINE | Facility: CLINIC | Age: 18
End: 2018-07-31
Payer: COMMERCIAL

## 2018-07-31 VITALS
OXYGEN SATURATION: 100 % | HEART RATE: 98 BPM | SYSTOLIC BLOOD PRESSURE: 114 MMHG | WEIGHT: 138 LBS | DIASTOLIC BLOOD PRESSURE: 77 MMHG | TEMPERATURE: 98.5 F | HEIGHT: 63 IN | BODY MASS INDEX: 24.45 KG/M2

## 2018-07-31 DIAGNOSIS — B37.31 VAGINAL YEAST INFECTION: ICD-10-CM

## 2018-07-31 DIAGNOSIS — R30.0 DYSURIA: Primary | ICD-10-CM

## 2018-07-31 DIAGNOSIS — Z30.09 GENERAL COUNSELING AND ADVICE ON CONTRACEPTIVE MANAGEMENT: ICD-10-CM

## 2018-07-31 LAB
ALBUMIN UR-MCNC: NEGATIVE MG/DL
APPEARANCE UR: ABNORMAL
BACTERIA #/AREA URNS HPF: ABNORMAL /HPF
BETA HCG QUAL IFA URINE: NEGATIVE
BILIRUB UR QL STRIP: NEGATIVE
COLOR UR AUTO: YELLOW
GLUCOSE UR STRIP-MCNC: NEGATIVE MG/DL
HGB UR QL STRIP: ABNORMAL
KETONES UR STRIP-MCNC: NEGATIVE MG/DL
LEUKOCYTE ESTERASE UR QL STRIP: ABNORMAL
MUCOUS THREADS #/AREA URNS LPF: PRESENT /LPF
NITRATE UR QL: NEGATIVE
NON-SQ EPI CELLS #/AREA URNS LPF: ABNORMAL /LPF
PH UR STRIP: 6 PH (ref 5–7)
RBC #/AREA URNS AUTO: ABNORMAL /HPF
SOURCE: ABNORMAL
SP GR UR STRIP: >1.03 (ref 1–1.03)
SPECIMEN SOURCE: ABNORMAL
UROBILINOGEN UR STRIP-ACNC: 0.2 EU/DL (ref 0.2–1)
WBC #/AREA URNS AUTO: ABNORMAL /HPF
WET PREP SPEC: ABNORMAL

## 2018-07-31 PROCEDURE — 81001 URINALYSIS AUTO W/SCOPE: CPT | Performed by: NURSE PRACTITIONER

## 2018-07-31 PROCEDURE — 87086 URINE CULTURE/COLONY COUNT: CPT | Performed by: NURSE PRACTITIONER

## 2018-07-31 PROCEDURE — 99214 OFFICE O/P EST MOD 30 MIN: CPT | Performed by: NURSE PRACTITIONER

## 2018-07-31 PROCEDURE — 87210 SMEAR WET MOUNT SALINE/INK: CPT | Performed by: NURSE PRACTITIONER

## 2018-07-31 PROCEDURE — 87088 URINE BACTERIA CULTURE: CPT | Performed by: NURSE PRACTITIONER

## 2018-07-31 PROCEDURE — 84703 CHORIONIC GONADOTROPIN ASSAY: CPT | Performed by: NURSE PRACTITIONER

## 2018-07-31 RX ORDER — FLUCONAZOLE 150 MG/1
150 TABLET ORAL
Qty: 2 TABLET | Refills: 0 | Status: SHIPPED | OUTPATIENT
Start: 2018-07-31 | End: 2018-09-19

## 2018-07-31 ASSESSMENT — PAIN SCALES - GENERAL: PAINLEVEL: SEVERE PAIN (7)

## 2018-07-31 NOTE — MR AVS SNAPSHOT
After Visit Summary   7/31/2018    Cindy Xie    MRN: [id number]           Patient Information     Date Of Birth          2000        Visit Information        Provider Department      7/31/2018 11:20 AM Areli Phipps APRN CNP [facility]        Today's Diagnoses     Dysuria    -  1    Vaginal yeast infection        General counseling and advice on contraceptive management          Care Instructions    Start diflucan today and repeat in 3 days  Urine culture is pending. We will call you if we need to start antibiotics  Drink at least 64 oz of water daily  If worsening or not improving in 3 days, please follow up with primary care provider     Think about which type of birth control you might want to use. Make an appointment once you have decided.    At [facility], we strive to deliver an exceptional experience to you, every time we see you.  If you receive a survey in the mail, please send us back your thoughts. We really do value your feedback.    Based on your medical history, these are the current health maintenance/preventive care services that you are due for (some may have been done at this visit.)  Health Maintenance Due   Topic Date Due     HIV SCREEN (SYSTEM ASSIGNED)  02/14/2016         Suggested websites for health information:  Www.Lendstar.org : Up to date and easily searchable information on multiple topics.  Www.medlineplus.gov : medication info, interactive tutorials, watch real surgeries online  Www.familydoctor.org : good info from the Academy of Family Physicians  Www.cdc.gov : public health info, travel advisories, epidemics (H1N1)  Www.aap.org : children's health info, normal development, vaccinations  Www.health.state.[place].us : [place] dept of health, public health issues in [place], N1N1    Your care team:                            Family Medicine Internal Medicine   MD Randall Espino MD Shantel Branch-Fleming, MD     Minerva Burns MD Pediatrics   KENISHA Royal, GRAY Hubbard APRMD Linda Blake CNP, MD Deborah Mielke, MD Kim Thein, APRLOYD [facility]      Clinic hours: Monday - Thursday 7 am-7 pm; Fridays 7 am-5 pm.   Urgent care: Monday - Friday 11 am-9 pm; Saturday and Sunday 9 am-5 pm.  Pharmacy : Monday -Thursday 8 am-8 pm; Friday 8 am-6 pm; Saturday and Sunday 9 am-5 pm.     Clinic: [phone]   Pharmacy: [phone]    Yeast Infection (Candida Vaginal Infection)    You have a Candida vaginal infection. This is also known as a yeast infection. It is most often caused by a type of yeast (fungus) called Candida. Candida are normally found in the vagina. But if they increase in number, this can lead to infection and cause symptoms.  Symptoms of a yeast infection can include:    Clumpy or thin, white discharge, which may look like cottage cheese    Itching or burning    Burning with urination  Certain factors can make a yeast infection more likely. These can include:    Taking certain medicines, such as antibiotics or birth control pills    Pregnancy    Diabetes    Weak immune system  A yeast infection is most often treated with antifungal medicine. This may be given as a vaginal cream or pills you take by mouth. Treatment may last for about 1 to 7 days. Women with severe or recurrent infections may need longer courses of treatment.  Home care    If you re prescribed medicine, be sure to use it as directed. Finish all of the medicine, even if your symptoms go away. Note: Don t try to treat yourself using over-the-counter products without talking to your provider first. He or she will let you know if this is a good option for you.    Ask your provider what steps you can take to help reduce your risk of having a yeast infection in the future.  Follow-up care  Follow up with your healthcare provider, or as directed.  When to seek medical advice  Call your  healthcare provider right away if:    You have a fever of 100.4 F (38 C) or higher, or as directed by your provider.    Your symptoms worsen, or they don t go away within a few days of starting treatment.    You have new pain in the lower belly or pelvic region.    You have side effects that bother you or a reaction to the cream or pills you re prescribed.    You or any partners you have sex with have new symptoms, such as a rash, joint pain, or sores.  Date Last Reviewed: 10/1/2017    4875-7822 [employer]. [address] [place] [address]. All rights reserved. This information is not intended as a substitute for professional medical care. Always follow your healthcare professional's instructions.        Birth Control Methods  Birth control methods are used to help prevent pregnancy. There are many different methods to choose from. Talk to your healthcare provider about which method is right for you. Be sure to ask your provider about the effectiveness of each method. Also ask about the benefits, risks, and side effects of each method.  Hormones  Some birth control methods work by releasing hormones such as progestin and estrogen. These methods include hormone implants, hormone shots, the vaginal ring, the patch, and birth control pills. They all work by stopping ovulation (release of the egg from the ovary). The implant is a small device that needs to be placed in the upper arm by a trained healthcare provider. It works for up to 3 years. Hormone injections must be repeated every 3 months. The vaginal ring must be replaced monthly (it can be removed during the fourth week of each cycle). The patch must be replaced weekly (it is not worn during the fourth week of each cycle). Birth control pills must be taken every day. All of these methods are effective and can be stopped at any time.  Intrauterine device (IUD)  An IUD is a small, T-shaped device. It must be placed in the uterus by a trained  healthcare provider. There are different types of IUDs available. They work by causing changes in the uterus that make it harder for sperm to reach the egg. Depending on the type of IUD you have, it may work for several years or longer. The IUD is a reversible birth control method. This means it can be removed at any time.  Condom  A condom is a sheath that forms a thin barrier between the penis and the vagina. It helps prevent pregnancy by keeping sperm from entering the vagina. When latex condoms are used, they have the added benefit of protecting against most STIs (sexually transmitted infections). Condoms are used each time there is sexual intercourse and should be discarded after each use. Ask your healthcare provider about the different types of condoms available. These include both the male condom and female condom.  Spermicide  Spermicides come as foams, jellies, creams, suppositories, and tablets.  They help prevent pregnancy by killing sperm. When used alone they are not that reliable. They work best when combined with other birth control methods such as diaphragms and cervical caps.  Sponge, diaphragm, and cervical cap  All of these methods help prevent pregnancy by covering the opening of the uterus (cervix). This prevents sperm from passing through.  The sponge contains spermicide. It can be bought over the counter. The sponge must be left in place for at least 6 hours after the last time you have sex. However, it should not stay in place for more than 24 hours. It should be discarded after it is used.  The diaphragm and cervical cap must be fitted and prescribed by your healthcare provider. Both are used with spermicide. The diaphragm must be left in place for at least 6 hours after sex. However, it should not stay in place for more than 24 hours. It can be washed and reused. The cervical cap must be left in place for at least 6 hours after sex. However, it should not stay in place for more than 48  hours. It can be washed and reused.  Withdrawal method  This is when the man pulls his penis out of the vagina just before ejaculation ( coming ). This lowers the amount of sperm entering the vagina. Be aware that fluids released just before ejaculation often still contain some sperm, so this method is not as reliable as certain other methods.  Rhythm method  This method requires that you know when in your menstrual cycle you are likely to become pregnant. Then, you avoid sex during those days. This requires careful planning and good discipline. Your healthcare provider can explain more about how this works.  Tubal ligation and vasectomy  These are surgical methods to prevent pregnancy. Tubal ligation is an option for women. The fallopian tubes are blocked or cut (ligated). This keeps the egg from passing into the uterus or sperm from reaching the egg. Vasectomy is an option for men. The tubes that normally carry sperm to the penis are either closed or blocked. Both tubal ligation and vasectomy are permanent birth control methods. This means reversal is either not possible or unlikely to work. They are good choices for women and men who know that they do not want to have children in the future.  Date Last Reviewed: 11/1/2017 2000-2017 [employer]. [address] [place] [address]. All rights reserved. This information is not intended as a substitute for professional medical care. Always follow your healthcare professional's instructions.                Follow-ups after your visit        Who to contact     If you have questions or need follow up information about today's clinic visit or your schedule please contact [facility] directly at [phone].  Normal or non-critical lab and imaging results will be communicated to you by MyChart, letter or phone within 4 business days after the clinic has received the results. If you do not hear from us within 7 days, please  "contact the clinic through [employer] or phone. If you have a critical or abnormal lab result, we will notify you by phone as soon as possible.  Submit refill requests through [employer] or call your pharmacy and they will forward the refill request to us. Please allow 3 business days for your refill to be completed.          Additional Information About Your Visit        [employer] Information     [employer] lets you send messages to your doctor, view your test results, renew your prescriptions, schedule appointments and more. To sign up, go to [email or web address] . Click on \"Log in\" on the left side of the screen, which will take you to the Welcome page. Then click on \"Sign up Now\" on the right side of the page.     You will be asked to enter the access code listed below, as well as some personal information. Please follow the directions to create your username and password.     Your access code is: [email or web address]  Expires: 2018 10:26 AM     Your access code will  in 90 days. If you need help or a new code, please call your [place] clinic or [phone].        Care EveryWhere ID     This is your Care EveryWhere ID. This could be used by other organizations to access your [place] medical records  [id number]        Your Vitals Were     Pulse Temperature Height Last Period Pulse Oximetry Breastfeeding?    98 98.5  F (36.9  C) (Oral) 5' 3.25\" (1.607 m) (LMP Unknown) 100% No    BMI (Body Mass Index)                   24.25 kg/m2            Blood Pressure from Last 3 Encounters:   18 114/77   18 120/75   18 122/76    Weight from Last 3 Encounters:   18 138 lb (62.6 kg)   18 132 lb 9.6 oz (60.1 kg)   18 132 lb 8 oz (60.1 kg)              We Performed the Following     Beta HCG Qual, Urine - [facility] and [place] ([id number])     UA reflex to Microscopic and Culture     Urine Culture Aerobic Bacterial     Urine Microscopic     Wet prep          Today's Medication Changes          These " changes are accurate as of 7/31/18 12:25 PM.  If you have any questions, ask your nurse or doctor.               Stop taking these medicines if you haven't already. Please contact your care team if you have questions.     cephALEXin 500 MG capsule   Commonly known as:  KEFLEX   Stopped by:  Areli Phipps APRN CNP           polyethylene glycol powder   Commonly known as:  MIRALAX   Stopped by:  Areli Phipps APRN CNP           triamcinolone 0.1 % cream   Commonly known as:  KENALOG   Stopped by:  Areli Phipps APRN CNP                Where to get your medicines      These medications were [place] [facility] [place] [address]  [address] [facility] [address]     Phone:  [phone]     fluconazole 150 MG tablet                Primary Care Provider Office Phone # Fax #    Marjorie Stahlloyd Duff [phone] [address]  [facility] [address]        Equal Access to Services     [facility]: Hadii aad ku hadasho Soomaali, waaxda luqadaha, qaybta kaalmada adeegyada, waxay idiin hayelisa zaragoza . So [facility] [phone].    ATENCIÓN: Si habla español, tiene a espinoza disposición servicios gratuitos de asistencia lingüística. Llame al [phone].    We comply with applicable federal civil rights laws and [place] laws. We do not discriminate on the basis of race, color, national origin, age, disability, sex, sexual orientation, or gender identity.            Thank you!     Thank you for choosing [facility]  for your care. Our goal is always to provide you with excellent care. Hearing back from our patients is one way we can continue to improve our services. Please take a few minutes to complete the written survey that you may receive in the mail after your visit with us. Thank you!             Your Updated Medication List - Protect others around you: Learn how to safely use, store and  throw away your medicines at www.disposemymeds.org.          This list is accurate as of 7/31/18 12:25 PM.  Always use your most recent med list.                   Brand Name Dispense Instructions for use Diagnosis    fluconazole 150 MG tablet    DIFLUCAN    2 tablet    Take 1 tablet (150 mg) by mouth every 3 days    Vaginal yeast infection

## 2018-07-31 NOTE — PROGRESS NOTES
"  SUBJECTIVE:   Cindy Xie is a 18 year old female who presents to clinic today for the following health issues:      Vaginal Symptoms  Onset: 5 days ago    Description:  Vaginal Discharge: none   Itching (Pruritis): YES  Burning sensation:  YES  Odor: no     Accompanying Signs & Symptoms:  Pain with Urination: YES  Abdominal Pain: YES  Fever: no     History:   Sexually active: YES  New Partner: no   Possibility of Pregnancy:  Don't Know    Precipitating factors:   Recent Antibiotic Use: no     Alleviating factors:  None    Therapies Tried and outcome: Monistat; temporary relieve    Itchy and painful in vaginal area. Burns a little with urination. Similar issue 2 months ago. No fever. No nausea or vomiting. No flank pain. No rash or sores. Exposed to herpes last year.    Also wants to talk about birth control. Recently took out Nexplanon because she \"wanted her body to cleanse.\" She is thinking she wants to restart it. Doesn't want to do shot or OCP (forgets to take). No hx pregnancy.    Problem list and histories reviewed & adjusted, as indicated.  Additional history: as documented    Patient Active Problem List   Diagnosis     Health Care Home     Suicide attempt using analgesics (H)     Family history of liver failure     History reviewed. No pertinent surgical history.    Social History   Substance Use Topics     Smoking status: Never Smoker     Smokeless tobacco: Never Used     Alcohol use No     Family History   Problem Relation Age of Onset     Liver Disease Mother      Liver transplant      Asthma Brother      Asthma Brother          No current outpatient prescriptions on file.     No Known Allergies    Reviewed and updated as needed this visit by clinical staff  Tobacco  Allergies  Meds  Problems  Med Hx  Surg Hx  Fam Hx  Soc Hx        Reviewed and updated as needed this visit by Provider  Allergies  Meds  Problems         ROS:  Constitutional, HEENT, cardiovascular, pulmonary, gi and gu " "systems are negative, except as otherwise noted.    OBJECTIVE:     /77 (BP Location: Right arm, Patient Position: Chair, Cuff Size: Adult Regular)  Pulse 98  Temp 98.5  F (36.9  C) (Oral)  Ht 5' 3.25\" (1.607 m)  Wt 138 lb (62.6 kg)  LMP  (LMP Unknown)  SpO2 100%  Breastfeeding? No  BMI 24.25 kg/m2  Body mass index is 24.25 kg/(m^2).  GENERAL: healthy, alert and no distress  NECK: no adenopathy, no asymmetry, masses, or scars and thyroid normal to palpation  RESP: lungs clear to auscultation - no rales, rhonchi or wheezes  CV: regular rate and rhythm, normal S1 S2, no S3 or S4, no murmur, click or rub, no peripheral edema and peripheral pulses strong  ABDOMEN: soft, nontender, no hepatosplenomegaly, no masses and bowel sounds normal   (female): normal female external genitalia, normal urethral meatus, vaginal mucosa without lesions  MS: no gross musculoskeletal defects noted, no edema  SKIN: no suspicious lesions or rashes  PSYCH: mentation appears normal, affect normal/bright    Diagnostic Test Results:  Results for orders placed or performed in visit on 07/31/18 (from the past 24 hour(s))   UA reflex to Microscopic and Culture   Result Value Ref Range    Color Urine Yellow     Appearance Urine Slightly Cloudy     Glucose Urine Negative NEG^Negative mg/dL    Bilirubin Urine Negative NEG^Negative    Ketones Urine Negative NEG^Negative mg/dL    Specific Gravity Urine >1.030 1.003 - 1.035    Blood Urine Trace (A) NEG^Negative    pH Urine 6.0 5.0 - 7.0 pH    Protein Albumin Urine Negative NEG^Negative mg/dL    Urobilinogen Urine 0.2 0.2 - 1.0 EU/dL    Nitrite Urine Negative NEG^Negative    Leukocyte Esterase Urine Small (A) NEG^Negative    Source Midstream Urine    Wet prep   Result Value Ref Range    Specimen Description Vagina     Wet Prep Yeast seen (A)     Wet Prep No Trichomonas seen     Wet Prep No clue cells seen    Urine Microscopic   Result Value Ref Range    WBC Urine 5-10 (A) OTO5^0 - 5 /HPF "    RBC Urine O - 2 OTO2^O - 2 /HPF    Squamous Epithelial /LPF Urine Many (A) FEW^Few /LPF    Bacteria Urine Moderate (A) NEG^Negative /HPF    Mucous Urine Present (A) NEG^Negative /LPF   Beta HCG Qual, Urine - [facility] and [place] ([id number])   Result Value Ref Range    Beta HCG Qual IFA Urine Negative NEG^Negative          ASSESSMENT/PLAN:     1. Dysuria  Urine culture is pending. We will call you if we need to start antibiotics  Drink at least 64 oz of water daily  If worsening or not improving in 3 days, please follow up with primary care provider   - UA reflex to Microscopic and Culture  - Wet prep  - Beta HCG Qual, Urine - [facility] and [place] ([id number])  - Urine Microscopic  - Urine Culture Aerobic Bacterial    2. Vaginal yeast infection  Start diflucan today and repeat in 3 days  - fluconazole (DIFLUCAN) 150 MG tablet; Take 1 tablet (150 mg) by mouth every 3 days  Dispense: 2 tablet; Refill: 0    3. General counseling and advice on contraceptive management  Patient is still not sure which method she would like next. We discussed IUD, nexplanon, Depo injection, pills. Advised condoms until she has a reliable method in place. She will make appointment for follow up when she has decided.      See Patient Instructions    The benefits, risks and potential side effects were discussed in detail. Black box warnings discussed as relevant. All patient questions were answered. The patient was instructed to follow up immediately if any adverse reactions develop.    Patient verbalizes understanding and agrees with plan of care. Patient stable for discharge.    Greater than 50% of the 25 min visit was spent on counseling and coordination of care for the above issues.       LEEANN Betancur [facility]

## 2018-07-31 NOTE — PATIENT INSTRUCTIONS
Start diflucan today and repeat in 3 days  Urine culture is pending. We will call you if we need to start antibiotics  Drink at least 64 oz of water daily  If worsening or not improving in 3 days, please follow up with primary care provider     Think about which type of birth control you might want to use. Make an appointment once you have decided.    At [facility], we strive to deliver an exceptional experience to you, every time we see you.  If you receive a survey in the mail, please send us back your thoughts. We really do value your feedback.    Based on your medical history, these are the current health maintenance/preventive care services that you are due for (some may have been done at this visit.)  Health Maintenance Due   Topic Date Due     HIV SCREEN (SYSTEM ASSIGNED)  02/14/2016         Suggested websites for health information:  Www.[employer] : Up to date and easily searchable information on multiple topics.  Www.medlineplus.gov : medication info, interactive tutorials, watch real surgeries online  Www.familydoctor.org : good info from the Academy of Family Physicians  Www.cdc.gov : public health info, travel advisories, epidemics (H1N1)  Www.aap.org : children's health info, normal development, vaccinations  Www.health.state[place] : [place] dept of health, public health issues in [place], N1N1    Your care team:                            Family Medicine Internal Medicine   MD Randall Espino MD Shantel Branch-Fleming, MD Katya Georgiev PA-C Nam Ho, MD Pediatrics   KENISHA Royal, MD Linda Booth CNP, MD Deborah Mielke, MD Kim Thein, LEEANN [facility]      Clinic hours: Monday - Thursday 7 am-7 pm; Fridays 7 am-5 pm.   Urgent care: Monday - Friday 11 am-9 pm; Saturday and Sunday 9 am-5 pm.  Pharmacy : Monday -Thursday 8 am-8 pm; Friday 8 am-6 pm; Saturday and Sunday 9 am-5 pm.     Clinic: [phone]    Pharmacy: [phone]    Yeast Infection (Candida Vaginal Infection)    You have a Candida vaginal infection. This is also known as a yeast infection. It is most often caused by a type of yeast (fungus) called Candida. Candida are normally found in the vagina. But if they increase in number, this can lead to infection and cause symptoms.  Symptoms of a yeast infection can include:    Clumpy or thin, white discharge, which may look like cottage cheese    Itching or burning    Burning with urination  Certain factors can make a yeast infection more likely. These can include:    Taking certain medicines, such as antibiotics or birth control pills    Pregnancy    Diabetes    Weak immune system  A yeast infection is most often treated with antifungal medicine. This may be given as a vaginal cream or pills you take by mouth. Treatment may last for about 1 to 7 days. Women with severe or recurrent infections may need longer courses of treatment.  Home care    If you re prescribed medicine, be sure to use it as directed. Finish all of the medicine, even if your symptoms go away. Note: Don t try to treat yourself using over-the-counter products without talking to your provider first. He or she will let you know if this is a good option for you.    Ask your provider what steps you can take to help reduce your risk of having a yeast infection in the future.  Follow-up care  Follow up with your healthcare provider, or as directed.  When to seek medical advice  Call your healthcare provider right away if:    You have a fever of 100.4 F (38 C) or higher, or as directed by your provider.    Your symptoms worsen, or they don t go away within a few days of starting treatment.    You have new pain in the lower belly or pelvic region.    You have side effects that bother you or a reaction to the cream or pills you re prescribed.    You or any partners you have sex with have new symptoms, such as a rash, joint pain, or sores.  Date  Last Reviewed: 10/1/2017    9829-8323 [employer]. [address] [place] [address], [place] [address]. All rights reserved. This information is not intended as a substitute for professional medical care. Always follow your healthcare professional's instructions.        Birth Control Methods  Birth control methods are used to help prevent pregnancy. There are many different methods to choose from. Talk to your healthcare provider about which method is right for you. Be sure to ask your provider about the effectiveness of each method. Also ask about the benefits, risks, and side effects of each method.  Hormones  Some birth control methods work by releasing hormones such as progestin and estrogen. These methods include hormone implants, hormone shots, the vaginal ring, the patch, and birth control pills. They all work by stopping ovulation (release of the egg from the ovary). The implant is a small device that needs to be placed in the upper arm by a trained healthcare provider. It works for up to 3 years. Hormone injections must be repeated every 3 months. The vaginal ring must be replaced monthly (it can be removed during the fourth week of each cycle). The patch must be replaced weekly (it is not worn during the fourth week of each cycle). Birth control pills must be taken every day. All of these methods are effective and can be stopped at any time.  Intrauterine device (IUD)  An IUD is a small, T-shaped device. It must be placed in the uterus by a trained healthcare provider. There are different types of IUDs available. They work by causing changes in the uterus that make it harder for sperm to reach the egg. Depending on the type of IUD you have, it may work for several years or longer. The IUD is a reversible birth control method. This means it can be removed at any time.  Condom  A condom is a sheath that forms a thin barrier between the penis and the vagina. It helps prevent pregnancy by keeping sperm  from entering the vagina. When latex condoms are used, they have the added benefit of protecting against most STIs (sexually transmitted infections). Condoms are used each time there is sexual intercourse and should be discarded after each use. Ask your healthcare provider about the different types of condoms available. These include both the male condom and female condom.  Spermicide  Spermicides come as foams, jellies, creams, suppositories, and tablets.  They help prevent pregnancy by killing sperm. When used alone they are not that reliable. They work best when combined with other birth control methods such as diaphragms and cervical caps.  Sponge, diaphragm, and cervical cap  All of these methods help prevent pregnancy by covering the opening of the uterus (cervix). This prevents sperm from passing through.  The sponge contains spermicide. It can be bought over the counter. The sponge must be left in place for at least 6 hours after the last time you have sex. However, it should not stay in place for more than 24 hours. It should be discarded after it is used.  The diaphragm and cervical cap must be fitted and prescribed by your healthcare provider. Both are used with spermicide. The diaphragm must be left in place for at least 6 hours after sex. However, it should not stay in place for more than 24 hours. It can be washed and reused. The cervical cap must be left in place for at least 6 hours after sex. However, it should not stay in place for more than 48 hours. It can be washed and reused.  Withdrawal method  This is when the man pulls his penis out of the vagina just before ejaculation ( coming ). This lowers the amount of sperm entering the vagina. Be aware that fluids released just before ejaculation often still contain some sperm, so this method is not as reliable as certain other methods.  Rhythm method  This method requires that you know when in your menstrual cycle you are likely to become pregnant.  Then, you avoid sex during those days. This requires careful planning and good discipline. Your healthcare provider can explain more about how this works.  Tubal ligation and vasectomy  These are surgical methods to prevent pregnancy. Tubal ligation is an option for women. The fallopian tubes are blocked or cut (ligated). This keeps the egg from passing into the uterus or sperm from reaching the egg. Vasectomy is an option for men. The tubes that normally carry sperm to the penis are either closed or blocked. Both tubal ligation and vasectomy are permanent birth control methods. This means reversal is either not possible or unlikely to work. They are good choices for women and men who know that they do not want to have children in the future.  Date Last Reviewed: 11/1/2017 2000-2017 [employer]. [address] [place] [address], [place] [address]. All rights reserved. This information is not intended as a substitute for professional medical care. Always follow your healthcare professional's instructions.

## 2018-07-31 NOTE — LETTER
July 31, 2018      Cindy Xie  [address]    [facility] [address]        To Whom It May Concern:    Cindy Xie  was seen on 7/31/2018.  Please excuse her until 8/1/2018 due to illness.        Sincerely,        LEEANN Betancur CNP

## 2018-08-01 ENCOUNTER — TELEPHONE (OUTPATIENT)
Dept: FAMILY MEDICINE | Facility: CLINIC | Age: 18
End: 2018-08-01

## 2018-08-01 DIAGNOSIS — N30.00 ACUTE CYSTITIS WITHOUT HEMATURIA: Primary | ICD-10-CM

## 2018-08-01 LAB
BACTERIA SPEC CULT: ABNORMAL
SPECIMEN SOURCE: ABNORMAL

## 2018-08-01 RX ORDER — CEPHALEXIN 500 MG/1
500 CAPSULE ORAL 2 TIMES DAILY
Qty: 14 CAPSULE | Refills: 0 | Status: SHIPPED | OUTPATIENT
Start: 2018-08-01 | End: 2018-08-08

## 2018-08-01 NOTE — TELEPHONE ENCOUNTER
This writer attempted to contact Cindy on 08/01/18      Reason for call results and left message.    Notes Recorded by Areli Phipps APRN CNP on 8/1/2018 at 3:08 PM  Please call patient and let them know their lab result was abnormal.  Urine culture grew out group B strep. Antibiotics sent to pharmacy on file.  ------    If patient calls back:   Patient contacted by a Registered Nurse. Inform patient that someone from the RN group will contact them, document that pt called and route to P DYAD 3 RN POOL [[id number]]        Jannette Martinez, RN

## 2018-08-02 NOTE — TELEPHONE ENCOUNTER
This writer attempted to contact Cindy on 08/02/18      Reason for call results/prescription and left message.      If patient calls back:   Patient contacted by a Registered Nurse. Inform patient that someone from the RN group will contact them, document that pt called and route to P DYAD 3 RN POOL [[id number]]        Jannette Martinez RN

## 2018-08-03 NOTE — TELEPHONE ENCOUNTER
Called and informed the patient of the results as written.    Lynda Beard RN, [facility]

## 2018-09-19 ENCOUNTER — OFFICE VISIT (OUTPATIENT)
Dept: FAMILY MEDICINE | Facility: CLINIC | Age: 18
End: 2018-09-19
Payer: COMMERCIAL

## 2018-09-19 VITALS
DIASTOLIC BLOOD PRESSURE: 83 MMHG | RESPIRATION RATE: 16 BRPM | HEIGHT: 63 IN | WEIGHT: 140.2 LBS | SYSTOLIC BLOOD PRESSURE: 136 MMHG | OXYGEN SATURATION: 100 % | TEMPERATURE: 98.6 F | HEART RATE: 88 BPM | BODY MASS INDEX: 24.84 KG/M2

## 2018-09-19 DIAGNOSIS — Z30.017 ENCOUNTER FOR INITIAL PRESCRIPTION OF IMPLANTABLE SUBDERMAL CONTRACEPTIVE: Primary | ICD-10-CM

## 2018-09-19 LAB — BETA HCG QUAL IFA URINE: NEGATIVE

## 2018-09-19 PROCEDURE — 99207 ZZC DROP WITH A PROCEDURE: CPT | Performed by: FAMILY MEDICINE

## 2018-09-19 PROCEDURE — 11981 INSERTION DRUG DLVR IMPLANT: CPT | Performed by: FAMILY MEDICINE

## 2018-09-19 PROCEDURE — 84703 CHORIONIC GONADOTROPIN ASSAY: CPT | Performed by: FAMILY MEDICINE

## 2018-09-19 ASSESSMENT — PAIN SCALES - GENERAL: PAINLEVEL: NO PAIN (0)

## 2018-09-19 NOTE — PROGRESS NOTES
"  SUBJECTIVE:   Cindy Xie is a 18 year old female who presents to clinic today for the following health issues:    Procedure: Insert of Nexplanon    Problem list and histories reviewed & adjusted, as indicated.  Additional history: as documented    Patient Active Problem List   Diagnosis     Health Care Home     Suicide attempt using analgesics (H)     Family history of liver failure     No past surgical history on file.    Social History   Substance Use Topics     Smoking status: Never Smoker     Smokeless tobacco: Never Used     Alcohol use No     Family History   Problem Relation Age of Onset     Liver Disease Mother      Liver transplant      Asthma Brother      Asthma Brother            Reviewed and updated as needed this visit by clinical staff  Tobacco  Allergies  Meds  Problems       Reviewed and updated as needed this visit by Provider  Allergies  Meds  Problems         ROS:  Constitutional, HEENT, cardiovascular, pulmonary, gi and gu systems are negative, except as otherwise noted.    OBJECTIVE:     /83 (BP Location: Left arm, Patient Position: Chair, Cuff Size: Adult Regular)  Pulse 88  Temp 98.6  F (37  C) (Oral)  Resp 16  Ht 5' 3.25\" (1.607 m)  Wt 140 lb 3.2 oz (63.6 kg)  LMP 08/30/2018 (Within Weeks)  SpO2 100%  Breastfeeding? No  BMI 24.64 kg/m2  Body mass index is 24.64 kg/(m^2).  GENERAL: healthy, alert and no distress  SKIN: no suspicious lesions or rashes  PSYCH: mentation appears normal, affect normal/bright    Diagnostic Test Results:  Results for orders placed or performed in visit on 09/19/18 (from the past 24 hour(s))   Beta HCG qual IFA urine   Result Value Ref Range    Beta HCG Qual IFA Urine Negative NEG^Negative          ASSESSMENT/PLAN:     1. Encounter for initial prescription of implantable subdermal contraceptive  Procedure Note:     Placement of Nexplanon for birth control    Informed consent reviewed and obtained.  The patient is positioned with her LEFT " arm flexed at the elbow.  A point 8 cm from the epicondyle is marked and another point 12 cm caudal from the epicondyle is marked on the inner arm.  This area is cleansed with betadine and then injected with 1% lidocaine with epi.  The Nexplanon device is opened and it is confirmed that the sonya is in the device.  A small stab incision is made at the point closer to the elbow.  The Nexplanon device is then placed just under the skin with care not to go too deep.  The device is then slowly removed, leaving the sonya behind.  The sonya is palpable in the appropriate place under the skin by both myself and the patient.  The incision is noted to be hemostatic and the area is wrapped with a 4x4 and Coban.      There were no complications and minimal blood loss.       - Beta HCG qual IFA urine  - ETONOGESTREL IMPLANT SYSTEM  - etonogestrel (IMPLANON/NEXPLANON) 68 MG IMPL; 1 each (68 mg) by Subdermal route continuous  Dispense: 1 each; Refill: 0  - INSERTION NON-BIODEGRADABLE DRUG DELIVERY IMPLANT    FUTURE APPOINTMENTS:       - Follow-up visit in 1 year for STD check and recommended home preg test in 2 weeks.    Marjorie Duff MD  [facility]

## 2018-09-19 NOTE — PATIENT INSTRUCTIONS
At [facility], we strive to deliver an exceptional experience to you, every time we see you.  If you receive a survey in the mail, please send us back your thoughts. We really do value your feedback.    Based on your medical history, these are the current health maintenance/preventive care services that you are due for (some may have been done at this visit.)  Health Maintenance Due   Topic Date Due     HIV SCREEN (SYSTEM ASSIGNED)  02/14/2016     INFLUENZA VACCINE (1) 09/01/2018       Suggested websites for health information:  Www.[facility].[employer] : Up to date and easily searchable information on multiple topics.  Www.medlineplus.gov : medication info, interactive tutorials, watch real surgeries online  Www.familydoctor.org : good info from the Academy of Family Physicians  Www.cdc.gov : public health info, travel advisories, epidemics (H1N1)  Www.aap.org : children's health info, normal development, vaccinations  Www.health.[facility].[place].us : [place] dept of health, public health issues in [place], N1N1    Your care team:                            Family Medicine Internal Medicine   MD Randall Espino MD Shantel Branch-Fleming, MD Katya Georgiev PA-C Megan Hill, APRN CNP    Berry Burns MD Pediatrics   Kike Mojica, PALAURA Aguilar, MD Xiomy Chambers APRN CNP   MD Linda Matta MD Deborah Mielke, MD Kim Thein, APRN [facility]      Clinic hours: Monday - Thursday 7 am-7 pm; Fridays 7 am-5 pm.   Urgent care: Monday - Friday 11 am-9 pm; Saturday and Sunday 9 am-5 pm.  Pharmacy : Monday -Thursday 8 am-8 pm; Friday 8 am-6 pm; Saturday and Sunday 9 am-5 pm.     Clinic: [phone]   Pharmacy: [phone]

## 2018-09-19 NOTE — MR AVS SNAPSHOT
After Visit Summary   9/19/2018    Cindy Xie    MRN: [id number]           Patient Information     Date Of Birth          2000        Visit Information        Provider Department      9/19/2018 10:40 AM Marjorie Green MD [facility]        Today's Diagnoses     Encounter for initial prescription of implantable subdermal contraceptive    -  1      Care Instructions    At [facility], we strive to deliver an exceptional experience to you, every time we see you.  If you receive a survey in the mail, please send us back your thoughts. We really do value your feedback.    Based on your medical history, these are the current health maintenance/preventive care services that you are due for (some may have been done at this visit.)  Health Maintenance Due   Topic Date Due     HIV SCREEN (SYSTEM ASSIGNED)  02/14/2016     INFLUENZA VACCINE (1) 09/01/2018       Suggested websites for health information:  Www.[employer] : Up to date and easily searchable information on multiple topics.  Www.medlineplus.gov : medication info, interactive tutorials, watch real surgeries online  Www.familydoctor.org : good info from the Academy of Family Physicians  Www.cdc.gov : public health info, travel advisories, epidemics (H1N1)  Www.aap.org : children's health info, normal development, vaccinations  Www.health.state.[place] : [place] dept of health, public health issues in [place], N1N1    Your care team:                            Family Medicine Internal Medicine   MD Randall Espino MD Shantel Branch-Fleming, MD Katya Georgiev PA-C Megan Hill, LEEANN Burns MD Pediatrics   KENISHA Royal, MD Xiomy Chambers APRN CNP   MD Linda Matta MD Deborah Mielke, MD Kim Thein, APRN [facility]      Clinic hours: Monday - Thursday 7 am-7 pm; Fridays 7 am-5 pm.   Urgent care: Monday - Friday 11 am-9  "pm; Saturday and  9 am-5 pm.  Pharmacy : Monday -Thursday 8 am-8 pm; Friday 8 am-6 pm; Saturday and  9 am-5 pm.     Clinic: [phone]   Pharmacy: [phone]                Follow-ups after your visit        Follow-up notes from your care team     Return in about 1 year (around 2019) for Lab Work.      Who to contact     If you have questions or need follow up information about today's clinic visit or your schedule please contact [facility] directly at [phone].  Normal or non-critical lab and imaging results will be communicated to you by MyChart, letter or phone within 4 business days after the clinic has received the results. If you do not hear from us within 7 days, please contact the clinic through [employer] or phone. If you have a critical or abnormal lab result, we will notify you by phone as soon as possible.  Submit refill requests through [employer] or call your pharmacy and they will forward the refill request to us. Please allow 3 business days for your refill to be completed.          Additional Information About Your Visit        [employer] Information     [employer] lets you send messages to your doctor, view your test results, renew your prescriptions, schedule appointments and more. To sign up, go to [email or web address] . Click on \"Log in\" on the left side of the screen, which will take you to the Welcome page. Then click on \"Sign up Now\" on the right side of the page.     You will be asked to enter the access code listed below, as well as some personal information. Please follow the directions to create your username and password.     Your access code is: [email or web address]  Expires: 2018 11:32 AM     Your access code will  in 90 days. If you need help or a new code, please call your [facility] or [phone].        Care EveryWhere ID     This is your Care EveryWhere ID. This could be used by other organizations to access your [facility] medical " "records  [id number]        Your Vitals Were     Pulse Temperature Respirations Height Last Period Pulse Oximetry    88 98.6  F (37  C) (Oral) 16 5' 3.25\" (1.607 m) 08/30/2018 (Within Weeks) 100%    Breastfeeding? BMI (Body Mass Index)                No 24.64 kg/m2           Blood Pressure from Last 3 Encounters:   09/19/18 136/83   07/31/18 114/77   06/11/18 120/75    Weight from Last 3 Encounters:   09/19/18 140 lb 3.2 oz (63.6 kg)   07/31/18 138 lb (62.6 kg)   06/11/18 132 lb 9.6 oz (60.1 kg)              We Performed the Following     Beta HCG qual IFA urine     ETONOGESTREL IMPLANT SYSTEM     INSERTION NON-BIODEGRADABLE DRUG DELIVERY IMPLANT          Today's Medication Changes          These changes are accurate as of 9/19/18 11:32 AM.  If you have any questions, ask your nurse or doctor.               Start taking these medicines.        Dose/Directions    etonogestrel 68 MG Impl   Commonly known as:  IMPLANON/NEXPLANON   Used for:  Encounter for initial prescription of implantable subdermal contraceptive   Started by:  Marjorie Green MD        Dose:  1 each   1 each (68 mg) by Subdermal route continuous   Quantity:  1 each   Refills:  0            Where to get your medicines      Some of these will need a paper prescription and others can be bought over the counter.  Ask your nurse if you have questions.     You don't need a prescription for these medications     etonogestrel 68 MG Impl                Primary Care Provider Office Phone # Fax #    Marjorie Duff [phone] [address]  [facility] [phone]        Equal Access to Services     KIRA WEBSTER AH: Hadii oumar fang Soomaali, waaxda luqadaha, qaybta kaalmada adeegyada, analilia akhtar. So [facility] [phone].    ATENCIÓN: Si habla español, tiene a espinoza disposición servicios gratuitos de asistencia lingüística. Llame al [phone].    We comply with " applicable federal civil rights laws and [place] laws. We do not discriminate on the basis of race, color, national origin, age, disability, sex, sexual orientation, or gender identity.            Thank you!     Thank you for choosing [facility]  for your care. Our goal is always to provide you with excellent care. Hearing back from our patients is one way we can continue to improve our services. Please take a few minutes to complete the written survey that you may receive in the mail after your visit with us. Thank you!             Your Updated Medication List - Protect others around you: Learn how to safely use, store and throw away your medicines at www.disposemymeds.org.          This list is accurate as of 9/19/18 11:32 AM.  Always use your most recent med list.                   Brand Name Dispense Instructions for use Diagnosis    etonogestrel 68 MG Impl    IMPLANON/NEXPLANON    1 each    1 each (68 mg) by Subdermal route continuous    Encounter for initial prescription of implantable subdermal contraceptive

## 2019-03-22 ENCOUNTER — OFFICE VISIT (OUTPATIENT)
Dept: FAMILY MEDICINE | Facility: CLINIC | Age: 19
End: 2019-03-22
Payer: COMMERCIAL

## 2019-03-22 VITALS
HEART RATE: 111 BPM | DIASTOLIC BLOOD PRESSURE: 86 MMHG | HEIGHT: 63 IN | TEMPERATURE: 98.2 F | WEIGHT: 150.8 LBS | SYSTOLIC BLOOD PRESSURE: 139 MMHG | OXYGEN SATURATION: 100 % | BODY MASS INDEX: 26.72 KG/M2

## 2019-03-22 DIAGNOSIS — K59.00 CONSTIPATION, UNSPECIFIED CONSTIPATION TYPE: ICD-10-CM

## 2019-03-22 DIAGNOSIS — Z11.59 ENCOUNTER FOR SCREENING FOR OTHER VIRAL DISEASES: ICD-10-CM

## 2019-03-22 DIAGNOSIS — R10.84 ABDOMINAL PAIN, GENERALIZED: Primary | ICD-10-CM

## 2019-03-22 DIAGNOSIS — Z12.4 SCREENING FOR MALIGNANT NEOPLASM OF CERVIX: ICD-10-CM

## 2019-03-22 DIAGNOSIS — Z11.3 SCREEN FOR STD (SEXUALLY TRANSMITTED DISEASE): ICD-10-CM

## 2019-03-22 DIAGNOSIS — Z11.4 SCREENING FOR HIV (HUMAN IMMUNODEFICIENCY VIRUS): ICD-10-CM

## 2019-03-22 LAB
ALBUMIN SERPL-MCNC: 4.2 G/DL (ref 3.4–5)
ALBUMIN UR-MCNC: NEGATIVE MG/DL
ALP SERPL-CCNC: 58 U/L (ref 40–150)
ALT SERPL W P-5'-P-CCNC: 19 U/L (ref 0–50)
APPEARANCE UR: CLEAR
AST SERPL W P-5'-P-CCNC: 16 U/L (ref 0–45)
BILIRUB DIRECT SERPL-MCNC: 0.2 MG/DL (ref 0–0.2)
BILIRUB SERPL-MCNC: 0.9 MG/DL (ref 0.2–1.3)
BILIRUB UR QL STRIP: NEGATIVE
COLOR UR AUTO: YELLOW
GLUCOSE UR STRIP-MCNC: NEGATIVE MG/DL
HCG UR QL: NEGATIVE
HGB UR QL STRIP: NEGATIVE
KETONES UR STRIP-MCNC: NEGATIVE MG/DL
LEUKOCYTE ESTERASE UR QL STRIP: NEGATIVE
NITRATE UR QL: NEGATIVE
NON-SQ EPI CELLS #/AREA URNS LPF: NORMAL /LPF
PH UR STRIP: 6.5 PH (ref 5–7)
PROT SERPL-MCNC: 7.9 G/DL (ref 6.8–8.8)
RBC #/AREA URNS AUTO: NORMAL /HPF
SOURCE: NORMAL
SP GR UR STRIP: <=1.005 (ref 1–1.03)
SPECIMEN SOURCE: NORMAL
UROBILINOGEN UR STRIP-ACNC: 0.2 EU/DL (ref 0.2–1)
WBC #/AREA URNS AUTO: NORMAL /HPF
WET PREP SPEC: NORMAL

## 2019-03-22 PROCEDURE — 87210 SMEAR WET MOUNT SALINE/INK: CPT | Performed by: NURSE PRACTITIONER

## 2019-03-22 PROCEDURE — 99214 OFFICE O/P EST MOD 30 MIN: CPT | Performed by: NURSE PRACTITIONER

## 2019-03-22 PROCEDURE — 0064U ANTB TP TOTAL&RPR IA QUAL: CPT | Performed by: NURSE PRACTITIONER

## 2019-03-22 PROCEDURE — 87340 HEPATITIS B SURFACE AG IA: CPT | Performed by: NURSE PRACTITIONER

## 2019-03-22 PROCEDURE — G0145 SCR C/V CYTO,THINLAYER,RESCR: HCPCS | Performed by: NURSE PRACTITIONER

## 2019-03-22 PROCEDURE — 80076 HEPATIC FUNCTION PANEL: CPT | Performed by: NURSE PRACTITIONER

## 2019-03-22 PROCEDURE — 87491 CHLMYD TRACH DNA AMP PROBE: CPT | Performed by: NURSE PRACTITIONER

## 2019-03-22 PROCEDURE — 81025 URINE PREGNANCY TEST: CPT | Performed by: NURSE PRACTITIONER

## 2019-03-22 PROCEDURE — 87389 HIV-1 AG W/HIV-1&-2 AB AG IA: CPT | Performed by: NURSE PRACTITIONER

## 2019-03-22 PROCEDURE — 81001 URINALYSIS AUTO W/SCOPE: CPT | Performed by: NURSE PRACTITIONER

## 2019-03-22 PROCEDURE — 36415 COLL VENOUS BLD VENIPUNCTURE: CPT | Performed by: NURSE PRACTITIONER

## 2019-03-22 PROCEDURE — 87591 N.GONORRHOEAE DNA AMP PROB: CPT | Performed by: NURSE PRACTITIONER

## 2019-03-22 ASSESSMENT — MIFFLIN-ST. JEOR: SCORE: 1422.11

## 2019-03-22 NOTE — PATIENT INSTRUCTIONS
At [facility], we strive to deliver an exceptional experience to you, every time we see you.  If you receive a survey in the mail, please send us back your thoughts. We really do value your feedback.    Your care team:                            Family Medicine Internal Medicine   MD Randall Espino MD Shantel Branch-Fleming, MD Katya Georgiev PA-C Megan Hill, APRN CNP    Berry Burns MD Pediatrics   Kike Mojica, KENISHA Aguilar, MD Xiomy Chambers APRN CNP   MD Linda Matta MD Deborah Mielke, MD Kristi Armando, APRN [facility]      Clinic hours: Monday - Thursday 7 am-7 pm; Fridays 7 am-5 pm.   Urgent care: Monday - Friday 11 am-9 pm; Saturday and Sunday 9 am-5 pm.  Pharmacy : Monday -Thursday 8 am-8 pm; Friday 8 am-6 pm; Saturday and Sunday 9 am-5 pm.     Clinic: [phone]   Pharmacy: [phone]        Patient Education     Constipation (Adult)  Constipation means that you have bowel movements that are less frequent than usual. Stools often become very hard and difficult to pass.  Constipation is very common. At some point in life, it affects almost everyone. Since everyone's bowel habits are different, what is constipation to one person may not be to another. Your healthcare provider may do tests to diagnose constipation. It depends on what he or she finds when evaluating you.    Symptoms of constipation include:    Abdominal pain    Bloating    Vomiting    Painful bowel movements    Itching, swelling, bleeding, or pain around the anus  Causes  Constipation can have many causes. These include:    Diet low in fiber    Too much dairy    Not drinking enough liquids    Lack of exercise or physical activity (especially true for older adults)    Changes in lifestyle or daily routine, including pregnancy, aging, work, and travel    Frequent use or misuse of laxatives    Ignoring the urge to have a bowel movement or delaying it until  later    Medicines, such as certain prescription pain medicines, iron supplements, antacids, certain antidepressants, and calcium supplements    Diseases like irritable bowel syndrome, bowel obstructions, stroke, diabetes, thyroid disease, Parkinson disease, hemorrhoids, and colon cancer  Complications  Potential complications of constipation can include:    Hemorrhoids    Rectal bleeding from hemorrhoids or anal fissures (skin tears)    Hernias    Dependency on laxatives    Chronic constipation    Fecal impaction, a severe form of constipation in which a large amount of hard stool is in your rectum that you can't pass    Bowel obstruction or perforation  Home care  All treatment should be done after talking with your healthcare provider. This is especially true if you have another medical problems, are taking prescription medicines, or are an older adult. Treatment most often involves lifestyle changes. You may also need medicines. Your healthcare provider will tell you which will work best for you. Follow the advice below to help avoid this problem in the future.  Lifestyle changes  These lifestyle changes can help prevent constipation:    Diet. Eat a high-fiber diet, with fresh fruit and vegetables, and reduce dairy intake, meats, and processed foods    Fluids. It's important to get enough fluids each day. Drink plenty of water when you eat more fiber. If you are on diet that limits the amount of fluid you can have, talk about this with your healthcare provider.    Regular exercise. Check with your healthcare provider first.  Medicines  Take any medicines as directed. Some laxatives are safe to use only every now and then. Others can be taken on a regular basis. While laxatives don't cause bowel dependence, they are treating the symptoms. So your constipation may return if you don't make other changes. Talk with your healthcare provider or pharmacist if you have questions.  Prescription pain medicines can cause  constipation. If you are taking this kind of medicine, ask your healthcare provider if you should also take a stool softener.  Medicines you may take to treat constipation include:    Fiber supplements    Stool softeners    Laxatives    Enemas    Rectal suppositories  Follow-up care  Follow up with your healthcare provider if symptoms don't get better in the next few days. You may need to have more tests or see a specialist.  Call 911  Call 911 if any of these occur:    Trouble breathing    Stiff, rigid abdomen that is severely painful to touch    Confusion    Fainting or loss of consciousness    Rapid heart rate    Chest pain  When to seek medical advice  Call your healthcare provider right away if any of these occur:    Fever of 100.4 F (38 C) or higher, or as directed by your healthcare provider    Failure to resume normal bowel movements    Pain in your abdomen or back gets worse    Nausea or vomiting    Swelling in your abdomen    Blood in the stool    Black, tarry stool    Involuntary weight loss    Weakness  Date Last Reviewed: 6/1/2018 2000-2018 [employer]. [address] [place] [address], [place] [address]. All rights reserved. This information is not intended as a substitute for professional medical care. Always follow your healthcare professional's instructions.
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
## 2019-03-23 LAB — T PALLIDUM AB SER QL: NONREACTIVE

## 2019-03-24 LAB
C TRACH DNA SPEC QL NAA+PROBE: NEGATIVE
HBV SURFACE AG SERPL QL IA: NONREACTIVE
HIV 1+2 AB+HIV1 P24 AG SERPL QL IA: NONREACTIVE
N GONORRHOEA DNA SPEC QL NAA+PROBE: NEGATIVE
SPECIMEN SOURCE: NORMAL
SPECIMEN SOURCE: NORMAL

## 2019-03-25 ENCOUNTER — MYC MEDICAL ADVICE (OUTPATIENT)
Dept: FAMILY MEDICINE | Facility: CLINIC | Age: 19
End: 2019-03-25

## 2019-03-26 ENCOUNTER — TELEPHONE (OUTPATIENT)
Dept: FAMILY MEDICINE | Facility: CLINIC | Age: 19
End: 2019-03-26

## 2019-03-26 LAB
COPATH REPORT: NORMAL
PAP: NORMAL

## 2019-03-26 NOTE — TELEPHONE ENCOUNTER
Reason for Call:  Request for results:    Name of test or procedure: 03/22/19 lab results    Date of test of procedure: 03/22/19    Location of the test or procedure: [facility]    OK to leave the result message on voice mail or with a family member? YES    Phone number Patient can be reached at:  Home number on file [phone] (home)    Additional comments: Pt calling for she came in for a lab apt on 03/22/19 and would like a call back to discuss lab results as soon as possible.    Call taken on 3/26/2019 at 3:28 PM by Greg Olivas

## 2019-03-27 NOTE — TELEPHONE ENCOUNTER
All of her labs were normal.  My apologies for not sending a letter to her!    Adalgisa BRADSHAW, CNP

## 2019-03-28 NOTE — TELEPHONE ENCOUNTER
This writer attempted to contact patient on 03/28/19      Reason for call results and left detailed message.      Anastasiya Dangelo MA

## 2019-07-09 ENCOUNTER — OFFICE VISIT (OUTPATIENT)
Dept: URGENT CARE | Facility: URGENT CARE | Age: 19
End: 2019-07-09
Payer: COMMERCIAL

## 2019-07-09 VITALS
HEART RATE: 84 BPM | OXYGEN SATURATION: 99 % | RESPIRATION RATE: 24 BRPM | DIASTOLIC BLOOD PRESSURE: 90 MMHG | SYSTOLIC BLOOD PRESSURE: 135 MMHG | TEMPERATURE: 99.2 F | WEIGHT: 145.4 LBS | BODY MASS INDEX: 25.55 KG/M2

## 2019-07-09 DIAGNOSIS — B96.89 BACTERIAL VAGINITIS: Primary | ICD-10-CM

## 2019-07-09 DIAGNOSIS — N76.0 BACTERIAL VAGINITIS: Primary | ICD-10-CM

## 2019-07-09 DIAGNOSIS — N89.8 VAGINAL DISCHARGE: ICD-10-CM

## 2019-07-09 LAB
ALBUMIN UR-MCNC: NEGATIVE MG/DL
APPEARANCE UR: CLEAR
BACTERIA #/AREA URNS HPF: ABNORMAL /HPF
BILIRUB UR QL STRIP: NEGATIVE
COLOR UR AUTO: YELLOW
GLUCOSE UR STRIP-MCNC: NEGATIVE MG/DL
HGB UR QL STRIP: ABNORMAL
KETONES UR STRIP-MCNC: NEGATIVE MG/DL
LEUKOCYTE ESTERASE UR QL STRIP: NEGATIVE
NITRATE UR QL: NEGATIVE
NON-SQ EPI CELLS #/AREA URNS LPF: ABNORMAL /LPF
PH UR STRIP: 8 PH (ref 5–7)
RBC #/AREA URNS AUTO: ABNORMAL /HPF
SOURCE: ABNORMAL
SP GR UR STRIP: 1.02 (ref 1–1.03)
SPECIMEN SOURCE: ABNORMAL
UROBILINOGEN UR STRIP-ACNC: 1 EU/DL (ref 0.2–1)
WBC #/AREA URNS AUTO: ABNORMAL /HPF
WET PREP SPEC: ABNORMAL

## 2019-07-09 PROCEDURE — 81001 URINALYSIS AUTO W/SCOPE: CPT | Performed by: NURSE PRACTITIONER

## 2019-07-09 PROCEDURE — 99213 OFFICE O/P EST LOW 20 MIN: CPT | Performed by: NURSE PRACTITIONER

## 2019-07-09 PROCEDURE — 87210 SMEAR WET MOUNT SALINE/INK: CPT | Performed by: NURSE PRACTITIONER

## 2019-07-09 RX ORDER — METRONIDAZOLE 500 MG/1
500 TABLET ORAL 2 TIMES DAILY
Qty: 14 TABLET | Refills: 0 | Status: SHIPPED | OUTPATIENT
Start: 2019-07-09 | End: 2019-09-09

## 2019-07-09 ASSESSMENT — ENCOUNTER SYMPTOMS
NAUSEA: 0
RHINORRHEA: 0
DYSURIA: 1
HEADACHES: 0
VOMITING: 0
SHORTNESS OF BREATH: 0
DIARRHEA: 0
FEVER: 0
SORE THROAT: 0
COUGH: 0
CHILLS: 0

## 2019-07-09 NOTE — PROGRESS NOTES
SUBJECTIVE:   Cindy Xie is a 19 year old female presenting with a chief complaint of   Chief Complaint   Patient presents with     Vaginal Problem     Vaginal discharge x1 month and burning started yesterday- used morrell yesterday.       She is an established patient of [facility].    GYN Complaint    Onset of symptoms was 1 month(s) ago.  Course of illness is worsening.    Severity moderate  Current and Associated symptoms: vaginal discharge, burning on urination  Discharge described as white, brown and malodorous  Treatment measures tried include:  None  Sexually active: yes, single partner, contraception - oral contraceptives  Predisposing factors: None  Hx of previous symptom: none        Review of Systems   Constitutional: Negative for chills and fever.   HENT: Negative for congestion, ear pain, rhinorrhea and sore throat.    Respiratory: Negative for cough and shortness of breath.    Gastrointestinal: Negative for diarrhea, nausea and vomiting.   Genitourinary: Positive for dysuria and vaginal discharge.   Neurological: Negative for headaches.   All other systems reviewed and are negative.      Past Medical History:   Diagnosis Date     History of malaria      Family History   Problem Relation Age of Onset     Liver Disease Mother         Liver transplant      Asthma Brother      Asthma Brother      Current Outpatient Medications   Medication Sig Dispense Refill     etonogestrel (IMPLANON/NEXPLANON) 68 MG IMPL 1 each (68 mg) by Subdermal route continuous 1 each 0     metroNIDAZOLE (FLAGYL) 500 MG tablet Take 1 tablet (500 mg) by mouth 2 times daily for 7 days 14 tablet 0     Social History     Tobacco Use     Smoking status: Never Smoker     Smokeless tobacco: Never Used   Substance Use Topics     Alcohol use: No       OBJECTIVE  /90   Pulse 84   Temp 99.2  F (37.3  C) (Oral)   Resp 24   Wt 66 kg (145 lb 6.4 oz)   SpO2 99%   BMI 25.55 kg/m      Physical Exam   Constitutional: She appears  well-developed and well-nourished. No distress.   HENT:   Head: Normocephalic and atraumatic.   Right Ear: Tympanic membrane and external ear normal.   Left Ear: Tympanic membrane and external ear normal.   Mouth/Throat: Oropharynx is clear and moist.   Eyes: Pupils are equal, round, and reactive to light. EOM are normal.   Neck: Normal range of motion. Neck supple.   Cardiovascular: Normal rate and normal heart sounds.   Pulmonary/Chest: Effort normal and breath sounds normal. No respiratory distress.   Abdominal: Soft. Bowel sounds are normal.   Lymphadenopathy:     She has no cervical adenopathy.   Neurological: She is alert. No cranial nerve deficit.   Skin: Skin is warm and dry. She is not diaphoretic.   Psychiatric: She has a normal mood and affect.   Nursing note and vitals reviewed.      Labs:  Results for orders placed or performed in visit on 07/09/19 (from the past 24 hour(s))   Wet prep   Result Value Ref Range    Specimen Description Vagina     Wet Prep Clue cells seen  Rare   (A)     Wet Prep WBC'S seen  Few       Wet Prep No Trichomonas seen     Wet Prep No yeast seen    UA reflex to Microscopic and Culture   Result Value Ref Range    Color Urine Yellow     Appearance Urine Clear     Glucose Urine Negative NEG^Negative mg/dL    Bilirubin Urine Negative NEG^Negative    Ketones Urine Negative NEG^Negative mg/dL    Specific Gravity Urine 1.020 1.003 - 1.035    Blood Urine Trace (A) NEG^Negative    pH Urine 8.0 (H) 5.0 - 7.0 pH    Protein Albumin Urine Negative NEG^Negative mg/dL    Urobilinogen Urine 1.0 0.2 - 1.0 EU/dL    Nitrite Urine Negative NEG^Negative    Leukocyte Esterase Urine Negative NEG^Negative    Source Midstream Urine    Urine Microscopic   Result Value Ref Range    WBC Urine 0 - 5 OTO5^0 - 5 /HPF    RBC Urine O - 2 OTO2^O - 2 /HPF    Squamous Epithelial /LPF Urine Moderate (A) FEW^Few /LPF    Bacteria Urine Few (A) NEG^Negative /HPF       ASSESSMENT:      ICD-10-CM    1. Vaginal discharge  N89.8 Wet prep     UA reflex to Microscopic and Culture     Urine Microscopic   2. Bacterial vaginitis N76.0 metroNIDAZOLE (FLAGYL) 500 MG tablet    B96.89         PLAN:  I recommend follow up with PCP in 3 days or sooner if symptoms are getting worse  Side effects of medications discussed  Advised no alcohol while on flagyl  All questions are answered and patient is in agreement with treatment plan  Alethea Perduecalderon  [facility]  Family Nurse Practitoner            Patient Instructions     Patient Education     Bacterial Vaginosis    You have a vaginal infection called bacterial vaginosis (BV). Both good and bad bacteria are present in a healthy vagina. BV occurs when these bacteria get out of balance. The number of bad bacteria increase. And the number of good bacteria decrease. Although BV is associated with sexual activity, it is not a sexually transmitted disease.  BV may or may not cause symptoms. If symptoms do occur, they can include:    Thin, gray, milky-white, or sometimes green discharge    Unpleasant odor or  fishy  smell    Itching, burning, or pain in or around the vagina  It is not known what causes BV, but certain factors can make the problem more likely. This can include:    Douching    Having sex with a new partner    Having sex with more than one partner  BV will sometimes go away on its own. But treatment is usually recommended. This is because untreated BV can increase the risk of more serious health problems such as:    Pelvic inflammatory disease (PID)     delivery (giving birth to a baby early if you re pregnant)    HIV and certain other sexually transmitted diseases (STDs)    Infection after surgery on the reproductive organs  Home care  General care    BV is most often treated with medicines called antibiotics. These may be given as pills or as a vaginal cream. If antibiotics are prescribed, be sure to use them exactly as directed. Also, be sure to complete all of the medicine, even if your  symptoms go away.    Don't douche or having sex during treatment.    If you have sex with a female partner, ask your healthcare provider if she should also be treated.  Prevention    Don't douche.    Don't have sex. If you do have sex, then take steps to lower your risk:  ? Use condoms when having sex.  ? Limit the number of sexual partners you have.  Follow-up care  Follow up with your healthcare provider, or as advised.  When to seek medical advice  Call your healthcare provider right away if:    You have a fever of 100.4 F (38 C) or higher, or as directed by your provider.    Your symptoms worsen, or they don t go away within a few days of starting treatment.    You have new pain in the lower belly or pelvic region.    You have side effects that bother you or a reaction to the pills or cream you re prescribed.    You or any partners you have sex with have new symptoms, such as a rash, joint pain, or sores.  Date Last Reviewed: 10/1/2017    9024-7324 [employer]. [address] [place] [address], [place] [address]. All rights reserved. This information is not intended as a substitute for professional medical care. Always follow your healthcare professional's instructions.

## 2019-07-09 NOTE — PATIENT INSTRUCTIONS

## 2019-07-23 ENCOUNTER — OFFICE VISIT (OUTPATIENT)
Dept: FAMILY MEDICINE | Facility: CLINIC | Age: 19
End: 2019-07-23
Payer: COMMERCIAL

## 2019-07-23 VITALS
HEART RATE: 79 BPM | WEIGHT: 142 LBS | HEIGHT: 63 IN | SYSTOLIC BLOOD PRESSURE: 125 MMHG | TEMPERATURE: 98.5 F | BODY MASS INDEX: 25.16 KG/M2 | OXYGEN SATURATION: 99 % | RESPIRATION RATE: 16 BRPM | DIASTOLIC BLOOD PRESSURE: 78 MMHG

## 2019-07-23 DIAGNOSIS — Z30.46 ENCOUNTER FOR NEXPLANON REMOVAL: Primary | ICD-10-CM

## 2019-07-23 PROCEDURE — 99207 ZZC DROP WITH A PROCEDURE: CPT | Performed by: PREVENTIVE MEDICINE

## 2019-07-23 PROCEDURE — 11982 REMOVE DRUG IMPLANT DEVICE: CPT | Performed by: PREVENTIVE MEDICINE

## 2019-07-23 ASSESSMENT — MIFFLIN-ST. JEOR: SCORE: 1382.2

## 2019-07-23 ASSESSMENT — PAIN SCALES - GENERAL: PAINLEVEL: NO PAIN (0)

## 2019-07-23 NOTE — PROGRESS NOTES
Subjective     Cindy Xie is a 19 year old female who presents to clinic today for the following health issues:    HPI   Concern - Removal of Nexplanon       Description:   Patient is here to remove her nexplanon.  Placed 9/19/18    Procedure Note - Etonogestrel Implant Removal    HPI: Cindy Xie is here for Nexplanon (etonogestrel implant) removal  Indication: Removal due to side effects   STI history:   Chlamydia negative 3/19   Future Contraception Plans? none    Counselling and Consent:  Affirmation of informed consent was signed and scanned into the medical record. Risks, benefits and alternatives were discussed.   Instructed on use of condoms for STI prevention.  Patient's questions were elicited and answered.        Preoperative Diagnosis:  Nexplanon Removal  Postoperative Diagnosis:  same     Technique:   Skin prep Betadine  Anesthesia 1% lidocaine, with epi  EBL:   minimal  Complications: No  Tolerance:  Pt tolerated procedure well and was in stable condition.     Pt was positioned on exam table with left arm flexed and externally rotated. Nexplanon device was palpated without difficulty. Previous nexplanon insertion site identified. This area is cleansed with betadine and then injected with 3 cc of 1% lidocaine with epinephrine.  A small stab incision is made at this previous site.  The nexplanon is guided into the incision and grasped with curved clamp and removed.  It was verified to be intact.  Showed device to patient.  The incision is noted to be hemostatic, steri strips applied and the area is wrapped with a 4x4 and Coban.  Aftercare instructions provided.    There were no complications and minimal blood loss.  Patient tolerated procedure well.    Follow up:  Pt was instructed to call if bleeding, severe pain or foul smell.  Instructed to remove pressure dressing after 24 hours, then may keep insertion site covered with a bandaid until it is healed.      CPT   Removal 31887    Patient  "Active Problem List   Diagnosis     Health Care Home     Suicide attempt using analgesics (H)     Family history of liver failure     History reviewed. No pertinent surgical history.    Social History     Tobacco Use     Smoking status: Never Smoker     Smokeless tobacco: Never Used   Substance Use Topics     Alcohol use: No     Family History   Problem Relation Age of Onset     Liver Disease Mother         Liver transplant      Asthma Brother      Asthma Brother          No current outpatient medications on file.     No Known Allergies  BP Readings from Last 3 Encounters:   07/23/19 125/78   07/09/19 135/90   03/22/19 139/86    Wt Readings from Last 3 Encounters:   07/23/19 64.4 kg (142 lb)   07/09/19 66 kg (145 lb 6.4 oz)   03/22/19 68.4 kg (150 lb 12.8 oz)              Reviewed and updated as needed this visit by Provider  Tobacco  Allergies  Meds  Problems  Med Hx  Surg Hx  Fam Hx         Review of Systems   ROS COMP: Constitutional, HEENT, cardiovascular, pulmonary, gi and gu systems are negative, except as otherwise noted.      Objective    /78 (BP Location: Left arm, Patient Position: Chair, Cuff Size: Adult Regular)   Pulse 79   Temp 98.5  F (36.9  C) (Oral)   Resp 16   Ht 1.607 m (5' 3.25\")   Wt 64.4 kg (142 lb)   SpO2 99%   BMI 24.96 kg/m    Body mass index is 24.96 kg/m .  Physical Exam     GENERAL APPEARANCE: healthy, alert and no distress  EYES: Eyes grossly normal to inspection and conjunctivae and sclerae normal  RESP: lungs clear to auscultation - no rales, rhonchi or wheezes  CV: regular rates and rhythm, normal S1 S2, no S3 or S4 and no murmur, click or rub  MS: extremities normal- no gross deformities noted and peripheral pulses normal  SKIN: no suspicious lesions or rashes  NEURO: Normal strength and tone, mentation intact and speech normal  PSYCH: mentation appears normal    Diagnostic Test Results:  Labs reviewed in Epic  No results found for this or any previous visit (from " "the past 24 hour(s)).        Assessment & Plan     Cindy was seen today for nexplanon removal.    Diagnoses and all orders for this visit:    Encounter for Nexplanon removal  -     REMOVAL NON-BIODEGRADABLE DRUG DELIVERY IMPLANT  -Removal done without complications    Nexplanon Removal Instructions.      You may remove the pressure bandage after 24 hours.  You may remove the small bandage in 3-5 days (allow the steri strips to fall off naturally in the shower).      Contact the clinic immediately if you develop any signs of infection such as warmth, redness, fever or discharge from the area.          BMI:   Estimated body mass index is 24.96 kg/m  as calculated from the following:    Height as of this encounter: 1.607 m (5' 3.25\").    Weight as of this encounter: 64.4 kg (142 lb).       Return in about 1 year (around 7/23/2020) for Routine Visit.    Paris Hidalgo MD MPH    [facility]        "

## 2019-09-09 ENCOUNTER — OFFICE VISIT (OUTPATIENT)
Dept: FAMILY MEDICINE | Facility: CLINIC | Age: 19
End: 2019-09-09
Payer: COMMERCIAL

## 2019-09-09 VITALS
HEIGHT: 63 IN | BODY MASS INDEX: 24.98 KG/M2 | WEIGHT: 141 LBS | SYSTOLIC BLOOD PRESSURE: 119 MMHG | TEMPERATURE: 98.5 F | OXYGEN SATURATION: 99 % | DIASTOLIC BLOOD PRESSURE: 78 MMHG | HEART RATE: 82 BPM

## 2019-09-09 DIAGNOSIS — Z30.013 ENCOUNTER FOR INITIAL PRESCRIPTION OF INJECTABLE CONTRACEPTIVE: Primary | ICD-10-CM

## 2019-09-09 LAB — HCG UR QL: NEGATIVE

## 2019-09-09 PROCEDURE — 99207 ZZC NO CHARGE LOS: CPT | Performed by: NURSE PRACTITIONER

## 2019-09-09 PROCEDURE — 96372 THER/PROPH/DIAG INJ SC/IM: CPT | Performed by: NURSE PRACTITIONER

## 2019-09-09 PROCEDURE — 81025 URINE PREGNANCY TEST: CPT | Performed by: NURSE PRACTITIONER

## 2019-09-09 RX ORDER — MEDROXYPROGESTERONE ACETATE 150 MG/ML
150 INJECTION, SUSPENSION INTRAMUSCULAR
Status: DISCONTINUED | OUTPATIENT
Start: 2019-09-09 | End: 2019-12-17

## 2019-09-09 RX ADMIN — MEDROXYPROGESTERONE ACETATE 150 MG: 150 INJECTION, SUSPENSION INTRAMUSCULAR at 16:11

## 2019-09-09 ASSESSMENT — MIFFLIN-ST. JEOR: SCORE: 1377.66

## 2019-09-09 ASSESSMENT — PAIN SCALES - GENERAL: PAINLEVEL: NO PAIN (0)

## 2019-09-09 NOTE — PATIENT INSTRUCTIONS
Depo today  Return in 3 months for next injection  Use condoms to decrease risk of sexually transmitted infection    Patient Education     Patient Education    Medroxyprogesterone Acetate Oral tablet    Medroxyprogesterone Acetate Suspension for injection [Contraception]    Medroxyprogesterone Acetate Suspension for injection [Malignancy]  Medroxyprogesterone Acetate Suspension for injection [Contraception]  What is this medicine?  MEDROXYPROGESTERONE (me DROX ee proe TUNDE te maral) contraceptive injections prevent pregnancy. They provide effective birth control for 3 months. Depo-subQ Provera 104 is also used for treating pain related to endometriosis.  This medicine may be used for other purposes; ask your health care provider or pharmacist if you have questions.  What should I tell my health care provider before I take this medicine?  They need to know if you have any of these conditions:    frequently drink alcohol    asthma    blood vessel disease or a history of a blood clot in the lungs or legs    bone disease such as osteoporosis    breast cancer    diabetes    eating disorder (anorexia nervosa or bulimia)    high blood pressure    HIV infection or AIDS    kidney disease    liver disease    mental depression    migraine    seizures (convulsions)    stroke    tobacco smoker    vaginal bleeding    an unusual or allergic reaction to medroxyprogesterone, other hormones, medicines, foods, dyes, or preservatives    pregnant or trying to get pregnant    breast-feeding  How should I use this medicine?  Depo-Provera Contraceptive injection is given into a muscle. Depo-subQ Provera 104 injection is given under the skin. These injections are given by a health care professional. You must not be pregnant before getting an injection. The injection is usually given during the first 5 days after the start of a menstrual period or 6 weeks after delivery of a baby.  Talk to your pediatrician regarding the use of this medicine  in children. Special care may be needed. These injections have been used in female children who have started having menstrual periods.  Overdosage: If you think you have taken too much of this medicine contact a poison control center or emergency room at once.  NOTE: This medicine is only for you. Do not share this medicine with others.  What if I miss a dose?  Try not to miss a dose. You must get an injection once every 3 months to maintain birth control. If you cannot keep an appointment, call and reschedule it. If you wait longer than 13 weeks between Depo-Provera contraceptive injections or longer than 14 weeks between Depo-subQ Provera 104 injections, you could get pregnant. Use another method for birth control if you miss your appointment. You may also need a pregnancy test before receiving another injection.  What may interact with this medicine?  Do not take this medicine with any of the following medications:    bosentan  This medicine may also interact with the following medications:    aminoglutethimide    antibiotics or medicines for infections, especially rifampin, rifabutin, rifapentine, and griseofulvin    aprepitant    barbiturate medicines such as phenobarbital or primidone    bexarotene    carbamazepine    medicines for seizures like ethotoin, felbamate, oxcarbazepine, phenytoin, topiramate    modafinil    [place]'s wort  This list may not describe all possible interactions. Give your health care provider a list of all the medicines, herbs, non-prescription drugs, or dietary supplements you use. Also tell them if you smoke, drink alcohol, or use illegal drugs. Some items may interact with your medicine.  What should I watch for while using this medicine?  This drug does not protect you against HIV infection (AIDS) or other sexually transmitted diseases.  Use of this product may cause you to lose calcium from your bones. Loss of calcium may cause weak bones (osteoporosis). Only use this product for  more than 2 years if other forms of birth control are not right for you. The longer you use this product for birth control the more likely you will be at risk for weak bones. Ask your health care professional how you can keep strong bones.  You may have a change in bleeding pattern or irregular periods. Many females stop having periods while taking this drug.  If you have received your injections on time, your chance of being pregnant is very low. If you think you may be pregnant, see your health care professional as soon as possible.  Tell your health care professional if you want to get pregnant within the next year. The effect of this medicine may last a long time after you get your last injection.  What side effects may I notice from receiving this medicine?  Side effects that you should report to your doctor or health care professional as soon as possible:    allergic reactions like skin rash, itching or hives, swelling of the face, lips, or tongue    breast tenderness or discharge    breathing problems    changes in vision    depression    feeling faint or lightheaded, falls    fever    pain in the abdomen, chest, groin, or leg    problems with balance, talking, walking    unusually weak or tired    yellowing of the eyes or skin  Side effects that usually do not require medical attention (report to your doctor or health care professional if they continue or are bothersome):    acne    fluid retention and swelling    headache    irregular periods, spotting, or absent periods    temporary pain, itching, or skin reaction at site where injected    weight gain  This list may not describe all possible side effects. Call your doctor for medical advice about side effects. You may report side effects to FDA at 5-584-FDA-9707.  Where should I keep my medicine?  This does not apply. The injection will be given to you by a health care professional.  NOTE:This sheet is a summary. It may not cover all possible information.  If you have questions about this medicine, talk to your doctor, pharmacist, or health care provider. Copyright  2016 [employer]

## 2019-09-09 NOTE — NURSING NOTE
Clinic Administered Medication Documentation    MEDICATION LIST:   Depo Provera Documentation    Prior to injection, verified patient identity using patient's name and date of birth. Medication was administered. Please see MAR and medication order for additional information. Patient instructed to wait 15 minutes and report any adverse reaction to staff immediately .    BP: 119/78    LAST PAP/EXAM:   Lab Results   Component Value Date    PAP NIL 03/22/2019     URINE HCG:negative    NEXT INJECTION DUE: 11/25/19 - 12/9/19    Injection time: 4:08pm  Route: IM right deltoid  Was entire vial of medication used? Yes  Vial/Syringe: Single dose vial  Expiration Date:  07/20    Hermann Hernandez CMA

## 2019-09-09 NOTE — PROGRESS NOTES
"Subjective     Cindy Xie is a 19 year old female who presents to clinic today for the following health issues:    HPI     Consult Birth Control       19 year old female here to start depo provera. Had nexplanon. But made her \"body feel weird\" and had it removed. No history of liver or gall bladder disease. Denies any current abnormal bleeding or vaginal discharge.     LMP about 2 weeks ago (08/26/2019) x3 days with moderate amount of bleeding, this was regular for her although she has not had menses for a few months due to nexplanon.    The patient is not more than 35 years old. She denies smoking, diabetes, HTN, DVT, headaches with focal neurological symptoms, migraines, known thrombogenic mutations, heart disease, history of stroke, valvular disease, abnormal uterine bleeding, liver disease and personal or family history of breast cancer.        Patient Active Problem List   Diagnosis     Health Care Home     Suicide attempt using analgesics (H)     Family history of liver failure     History reviewed. No pertinent surgical history.    Social History     Tobacco Use     Smoking status: Never Smoker     Smokeless tobacco: Never Used   Substance Use Topics     Alcohol use: No     Family History   Problem Relation Age of Onset     Liver Disease Mother         Liver transplant      Asthma Brother      Asthma Brother          No current outpatient medications on file.     No Known Allergies    Reviewed and updated as needed this visit by Provider  Tobacco  Allergies  Meds  Problems  Med Hx  Surg Hx  Fam Hx        Review of Systems   CONSTITUTIONAL:NEGATIVE for fever, chills, change in weight  RESP:NEGATIVE for significant cough or SHORT OF BREATH      Objective    /78 (BP Location: Left arm, Patient Position: Chair, Cuff Size: Adult Regular)   Pulse 82   Temp 98.5  F (36.9  C) (Oral)   Ht 1.607 m (5' 3.25\")   Wt 64 kg (141 lb)   LMP 08/26/2019   SpO2 99%   BMI 24.78 kg/m    Body mass index is " 24.78 kg/m .  Physical Exam   GENERAL: healthy, alert and no distress  NECK: no adenopathy, no asymmetry, masses, or scars and thyroid normal to palpation  RESP: lungs clear to auscultation - no rales, rhonchi or wheezes  CV: regular rate and rhythm, normal S1 S2, no S3 or S4, no murmur, click or rub, no peripheral edema and peripheral pulses strong  ABDOMEN: soft, nontender, no hepatosplenomegaly, no masses and bowel sounds normal  MS: no gross musculoskeletal defects noted, no edema    Diagnostic Test Results:  Labs reviewed in Epic  Results for orders placed or performed in visit on 09/09/19 (from the past 24 hour(s))   HCG Qual, Urine ([id number])   Result Value Ref Range    HCG Qual Urine Negative NEG^Negative           Assessment & Plan     1. Encounter for initial prescription of injectable contraceptive   Counseled on common side effects of depo-provera (including weight gain, abnormal menses), schedule of medication administration, importance of additional barrier use for STI protection. Counseled on fertility resumption taking up to 10-18 months after depo-provera is discontinued.  - HCG Qual, Urine ([id number])    See Patient Instructions.    Return in about 3 months (around 12/9/2019).    Rakesh Cleaning, ANURADHAP student, Areli VAN, was present with the nurse practitioner student who participated in the service and in the documentation of the note.  I have verified the history and personally performed the physical exam and medical decision making.  I agree with the assessment and plan of care as documented in the note.      The benefits, risks and potential side effects were discussed in detail. Black box warnings discussed as relevant. All patient questions were answered. The patient was instructed to follow up immediately if any adverse reactions develop.    Return precautions discussed, including when to seek urgent/emergent care.    Patient verbalizes understanding and agrees with plan of care.  Patient stable for discharge.        LEEANN Betancur [facility]

## 2019-09-10 ENCOUNTER — OFFICE VISIT (OUTPATIENT)
Dept: URGENT CARE | Facility: URGENT CARE | Age: 19
End: 2019-09-10
Payer: COMMERCIAL

## 2019-09-10 VITALS
SYSTOLIC BLOOD PRESSURE: 133 MMHG | WEIGHT: 137.8 LBS | HEART RATE: 73 BPM | RESPIRATION RATE: 20 BRPM | OXYGEN SATURATION: 100 % | DIASTOLIC BLOOD PRESSURE: 80 MMHG | TEMPERATURE: 98.7 F | BODY MASS INDEX: 24.22 KG/M2

## 2019-09-10 DIAGNOSIS — N89.8 VAGINAL ITCHING: Primary | ICD-10-CM

## 2019-09-10 LAB
SPECIMEN SOURCE: NORMAL
WET PREP SPEC: NORMAL

## 2019-09-10 PROCEDURE — 87210 SMEAR WET MOUNT SALINE/INK: CPT | Performed by: NURSE PRACTITIONER

## 2019-09-10 PROCEDURE — 99213 OFFICE O/P EST LOW 20 MIN: CPT | Performed by: NURSE PRACTITIONER

## 2019-09-10 RX ORDER — MICONAZOLE NITRATE 2 %
1 CREAM WITH APPLICATOR VAGINAL AT BEDTIME
Qty: 45 G | Refills: 0 | Status: SHIPPED | OUTPATIENT
Start: 2019-09-10 | End: 2019-12-17

## 2019-09-10 ASSESSMENT — ENCOUNTER SYMPTOMS
VOMITING: 0
CHILLS: 0
FEVER: 0
RHINORRHEA: 0
SORE THROAT: 0
HEADACHES: 0
COUGH: 0
SHORTNESS OF BREATH: 0
DIARRHEA: 0
NAUSEA: 0

## 2019-09-10 NOTE — PROGRESS NOTES
SUBJECTIVE:   Cindy Xie is a 19 year old female presenting with a chief complaint of   Chief Complaint   Patient presents with     Vaginal Problem     Pain & itching x1 week       She is an established patient of [facility].    GYN Complaint    Onset of symptoms was 1 week(s) ago.  Course of illness is worsening.    Severity moderate  Current and Associated symptoms: local irritation and vulvar itching  Denies STD exposure  Treatment measures tried include:  None  Sexually active: no  Predisposing factors: None  Hx of previous symptom: none    Review of Systems   Constitutional: Negative for chills and fever.   HENT: Negative for congestion, ear pain, rhinorrhea and sore throat.    Respiratory: Negative for cough and shortness of breath.    Gastrointestinal: Negative for diarrhea, nausea and vomiting.   Genitourinary: Positive for vaginal discharge (itch and discomfort).   Neurological: Negative for headaches.   All other systems reviewed and are negative.      Past Medical History:   Diagnosis Date     History of malaria      Family History   Problem Relation Age of Onset     Liver Disease Mother         Liver transplant      Asthma Brother      Asthma Brother      No current outpatient medications on file.     Social History     Tobacco Use     Smoking status: Never Smoker     Smokeless tobacco: Never Used   Substance Use Topics     Alcohol use: No       OBJECTIVE  /80   Pulse 73   Temp 98.7  F (37.1  C) (Oral)   Resp 20   Wt 62.5 kg (137 lb 12.8 oz)   LMP 08/26/2019   SpO2 100%   BMI 24.22 kg/m      Physical Exam   Constitutional: No distress.   HENT:   Head: Normocephalic and atraumatic.   Right Ear: Tympanic membrane and external ear normal.   Left Ear: Tympanic membrane and external ear normal.   Mouth/Throat: Oropharynx is clear and moist.   Eyes: Pupils are equal, round, and reactive to light. EOM are normal.   Neck: Normal range of motion. Neck supple.   Pulmonary/Chest: Effort normal and  breath sounds normal. No respiratory distress.   Lymphadenopathy:     She has no cervical adenopathy.   Neurological: She is alert. No cranial nerve deficit.   Skin: Skin is warm and dry. She is not diaphoretic.   Psychiatric: She has a normal mood and affect.   Nursing note and vitals reviewed.      Labs:  Results for orders placed or performed in visit on 09/10/19 (from the past 24 hour(s))   Wet prep   Result Value Ref Range    Specimen Description Vagina     Wet Prep No Trichomonas seen     Wet Prep No clue cells seen     Wet Prep No yeast seen     Wet Prep No WBC's seen        ASSESSMENT:      ICD-10-CM    1. Vaginal itching N89.8 Wet prep        PLAN:  I discussed lab results with the patient.  I recommend follow up with PCP in 3 days or sooner if symptoms are getting worse  Over the counter medications discussed  All questions are answered and patient is in agreement with treatment plan  Alethea Mancini  [facility]  Family Nurse Practitoner              Followup:        There are no Patient Instructions on file for this visit.

## 2019-09-10 NOTE — PATIENT INSTRUCTIONS
Patient Education     Preventing Vaginal Infection  These steps can help you stay comfortable during treatment of a vaginal infection. They also help prevent vaginal infections in the future.  Keeping a healthy balance  Factors that change the normal balance in the vagina can lead to a vaginal infection. To help keep the balance normal, try these tips:    Change out of wet bathing suits and damp exercise clothes as soon as possible. Yeast thrive in a warm, moist environment.    Avoid wearing tight pants. Choose cotton underwear and pantyhose that have a cotton crotch. Cotton keeps you cooler and drier than synthetics.    Don't douche unless directed by your healthcare provider. Douching can destroy friendly bacteria and change the vagina's normal balance.    Wipe from front to back after using the toilet. This prevents bacteria from spreading from the anus to the vulva.    Wash the vulva with mild, unscented soap or with plain water.    Wash your diaphragm, spermicide applicators, and sex toys with mild soap and water after use. Dry them thoroughly before putting them away.    Change tampons often (every 2 hours to 4 hours). Leaving a tampon in for too long may disrupt the balance of vaginal bacteria.    Avoid vaginal sprays, scented toilet paper and soaps, and deodorant tampons or pads, which can cause vaginal irritation  Staying healthy overall  Good overall health can help you resist infection. To be healthier:    Help protect yourself from STIs (sexually transmitted infections) by using latex condoms for intercourse. Ask your healthcare provider for more information about safer sex.    Eat a variety of healthy foods.    Exercise regularly.    Get enough rest and sleep.    Maintain a healthy weight. If you need to lose weight, ask your healthcare provider for advice on how to start.  Date Last Reviewed: 9/1/2017 2000-2018 [employer]. [address] [place] [address], [place] [address]. All rights  reserved. This information is not intended as a substitute for professional medical care. Always follow your healthcare professional's instructions.

## 2019-09-10 NOTE — LETTER
[facility]  [address]  [facility] [address]  Phone: [phone]    September 10, 2019        Cindy Xie  [address]   [facility] [address]          To whom it may concern:    RE: Cindy Xie    Patient was seen and treated today at our clinic.  Patient may return to work 9/11/2019 with no restrictions    Please contact me for questions or concerns.      Sincerely,        Alethea Mancini NP, APRN

## 2019-10-29 ENCOUNTER — OFFICE VISIT (OUTPATIENT)
Dept: FAMILY MEDICINE | Facility: CLINIC | Age: 19
End: 2019-10-29
Payer: COMMERCIAL

## 2019-10-29 VITALS
HEIGHT: 63 IN | OXYGEN SATURATION: 100 % | TEMPERATURE: 98.6 F | RESPIRATION RATE: 16 BRPM | DIASTOLIC BLOOD PRESSURE: 80 MMHG | BODY MASS INDEX: 24.64 KG/M2 | SYSTOLIC BLOOD PRESSURE: 128 MMHG | HEART RATE: 78 BPM | WEIGHT: 139.1 LBS

## 2019-10-29 DIAGNOSIS — N76.0 BACTERIAL VAGINOSIS: ICD-10-CM

## 2019-10-29 DIAGNOSIS — B96.89 BACTERIAL VAGINOSIS: ICD-10-CM

## 2019-10-29 DIAGNOSIS — N92.1 MENOMETRORRHAGIA: Primary | ICD-10-CM

## 2019-10-29 LAB
ERYTHROCYTE [DISTWIDTH] IN BLOOD BY AUTOMATED COUNT: 13.3 % (ref 10–15)
HCG UR QL: NEGATIVE
HCT VFR BLD AUTO: 39.6 % (ref 35–47)
HGB BLD-MCNC: 13.1 G/DL (ref 11.7–15.7)
MCH RBC QN AUTO: 29.9 PG (ref 26.5–33)
MCHC RBC AUTO-ENTMCNC: 33.1 G/DL (ref 31.5–36.5)
MCV RBC AUTO: 90 FL (ref 78–100)
PLATELET # BLD AUTO: 261 10E9/L (ref 150–450)
RBC # BLD AUTO: 4.38 10E12/L (ref 3.8–5.2)
SPECIMEN SOURCE: ABNORMAL
WBC # BLD AUTO: 6.4 10E9/L (ref 4–11)
WET PREP SPEC: ABNORMAL

## 2019-10-29 PROCEDURE — 36415 COLL VENOUS BLD VENIPUNCTURE: CPT | Performed by: NURSE PRACTITIONER

## 2019-10-29 PROCEDURE — 85027 COMPLETE CBC AUTOMATED: CPT | Performed by: NURSE PRACTITIONER

## 2019-10-29 PROCEDURE — 99214 OFFICE O/P EST MOD 30 MIN: CPT | Performed by: NURSE PRACTITIONER

## 2019-10-29 PROCEDURE — 87210 SMEAR WET MOUNT SALINE/INK: CPT | Performed by: NURSE PRACTITIONER

## 2019-10-29 PROCEDURE — 81025 URINE PREGNANCY TEST: CPT | Performed by: NURSE PRACTITIONER

## 2019-10-29 RX ORDER — METRONIDAZOLE 500 MG/1
500 TABLET ORAL 2 TIMES DAILY
Qty: 14 TABLET | Refills: 0 | Status: SHIPPED | OUTPATIENT
Start: 2019-10-29 | End: 2019-12-12

## 2019-10-29 ASSESSMENT — PAIN SCALES - GENERAL: PAINLEVEL: MODERATE PAIN (5)

## 2019-10-29 ASSESSMENT — MIFFLIN-ST. JEOR: SCORE: 1365.08

## 2019-10-29 NOTE — PATIENT INSTRUCTIONS
Start metronidazole today. No drinking alcohol while on this medication and for 2 days after.   Avoid douching and irritants such as bubble baths or strong soaps.  No intercourse until you have completed treatment.   If not improving in 5 days, follow up with primary care provider.    I suspect the mucus and irregular bleeding is from the depo. It should get better with time but can take a few months    At [facility], we strive to deliver an exceptional experience to you, every time we see you. If you receive a survey, please complete it as we do value your feedback.  If you have MyChart, you can expect to receive results automatically within 24 hours of their completion.  Your provider will send a note interpreting your results as well.   If you do not have MyChart, you should receive your results in about a week by mail.    Your care team:                            Family Medicine Internal Medicine   MD [place] Espino MD Shantel Branch-Fleming, MD Katya Georgiev PA-C Megan Hill, APRN CNP    Berry Burns MD Pediatrics   KENISHA Royal, MD Xiomy Chambers APRN CNP   MD Linda Matta MD Deborah Mielke, MD Kim Thein, APRN [facility]      Clinic hours: Monday - Thursday 7 am-7 pm; Fridays 7 am-5 pm.   Urgent care: Monday - Friday 11 am-9 pm; Saturday and Sunday 9 am-5 pm.  Pharmacy : Monday -Thursday 8 am-8 pm; Friday 8 am-6 pm; Saturday and Sunday 9 am-5 pm.     Clinic: [phone]   Pharmacy: [phone]        Patient Education     Bacterial Vaginosis    You have a vaginal infection called bacterial vaginosis (BV). Both good and bad bacteria are present in a healthy vagina. BV occurs when these bacteria get out of balance. The number of bad bacteria increase. And the number of good bacteria decrease. Although BV is associated with sexual activity, it is not a sexually transmitted disease.  BV may or may not  cause symptoms. If symptoms do occur, they can include:    Thin, gray, milky-white, or sometimes green discharge    Unpleasant odor or  fishy  smell    Itching, burning, or pain in or around the vagina  It is not known what causes BV, but certain factors can make the problem more likely. This can include:    Douching    Having sex with a new partner    Having sex with more than one partner  BV will sometimes go away on its own. But treatment is usually recommended. This is because untreated BV can increase the risk of more serious health problems such as:    Pelvic inflammatory disease (PID)     delivery (giving birth to a baby early if you re pregnant)    HIV and certain other sexually transmitted diseases (STDs)    Infection after surgery on the reproductive organs  Home care  General care    BV is most often treated with medicines called antibiotics. These may be given as pills or as a vaginal cream. If antibiotics are prescribed, be sure to use them exactly as directed. Also, be sure to complete all of the medicine, even if your symptoms go away.    Don't douche or having sex during treatment.    If you have sex with a female partner, ask your healthcare provider if she should also be treated.  Prevention    Don't douche.    Don't have sex. If you do have sex, then take steps to lower your risk:  ? Use condoms when having sex.  ? Limit the number of sexual partners you have.  Follow-up care  Follow up with your healthcare provider, or as advised.  When to seek medical advice  Call your healthcare provider right away if:    You have a fever of 100.4 F (38 C) or higher, or as directed by your provider.    Your symptoms worsen, or they don t go away within a few days of starting treatment.    You have new pain in the lower belly or pelvic region.    You have side effects that bother you or a reaction to the pills or cream you re prescribed.    You or any partners you have sex with have new symptoms, such as  a rash, joint pain, or sores.  Date Last Reviewed: 10/1/2017    1552-5312 [employer]. [address] [place] [address], [place] [address]. All rights reserved. This information is not intended as a substitute for professional medical care. Always follow your healthcare professional's instructions.           Patient Education     Dysfunctional Uterine Bleeding    Dysfunctional uterine bleeding, also called abnormal uterine bleeding, is a condition in which bleeding is abnormal and occurs at unexpected times of the month. This happens because of changes in the hormones that help control a woman s menstrual cycle each month.  The bleeding may be heavier or lighter than normal. If you have heavy bleeding often, this can lead to a problem called anemia. With anemia, your red blood cell count is too low. Red blood cells help carry oxygen throughout your body. Severe anemia may cause you to look pale and feel very weak or tired. You might also become short of breath easily.  To treat dysfunctional uterine bleeding, medicines are often tried first. If these don t help, or if you have additional symptoms or have reached menopause, further testing and treatments may be needed. Discuss all of your options with your provider.  Home care  Medicines  If you re prescribed medicines, be sure to take them as directed. Some of the more common medicines you may be prescribed include:    Hormone therapy (Options include most methods of hormonal birth control such as pills, shots, or a hormone-releasing IUD)    Nonsteroidal anti-inflammatory drugs (NSAIDs), such as ibuprofen    Iron supplements, if you have anemia     General care    Get plenty of rest if you tire easily. Avoid heavy exertion.    To help relieve pain or cramping that may occur with bleeding, try using a heating pad on the lower belly or back. A warm bath may also help.  Follow-up care  Follow up with your healthcare provider, or as directed.  When to seek medical  advice  Call your healthcare provider right away if:    Bleeding becomes heavy (soaking 1 pad or tampon every hour for 3 hours)    Increased abdominal pain    Irregular bleeding worsens or does not get better even with treatment    Fever of 100.4 F (38 C) or higher, or as directed by your provider    Signs of anemia, such as pale skin, extreme fatigue or weakness, or shortness of breath    Dizziness or fainting   Date Last Reviewed: 11/1/2017 2000-2018 [employer]. [address] [place] [address]. All rights reserved. This information is not intended as a substitute for professional medical care. Always follow your healthcare professional's instructions.

## 2019-10-29 NOTE — PROGRESS NOTES
Subjective     Cindy Xie is a 19 year old female who presents to clinic today for the following health issues:    HPI   Vaginal Bleeding (Dysmenorrhea)  Onset:10/25/2019    Description:   Duration of bleeding episodes:  6 days  Frequency between periods:  Patient states been a few months since last period  Describe bleeding/flow:   Clots: YES  Number of pads/hour: 1  Cramping: severe and moderate    Accompanying Signs & Symptoms:  Weakness: YES  Lightheadedness: YES- current headache  Hot flashes: no   Nosebleeds/Easy bruising: no   Vaginal Discharge: no     History:  No LMP recorded.  Previous normal periods: no   Contraceptive use: Depo-Provera  Possibility of Pregnancy: no   Any bleeding after intercourse: no   Age of first period (menarche): 10      Abnormal PAP Smears: no     Precipitating factors:   none    Alleviating factors:  none    Therapies Tried and outcome: ibuprofen , upset stomach more     Low abdomen and low back aching  Passed large clot 4 days ago, 5 days after period started  Some nausea at times, no vomiting  No fever  No dysuria, urgency or frequency  Normal BMs  No rash or lesions      Patient Active Problem List   Diagnosis     Health Care Home     Suicide attempt using analgesics (H)     Family history of liver failure     No past surgical history on file.    Social History     Tobacco Use     Smoking status: Never Smoker     Smokeless tobacco: Never Used   Substance Use Topics     Alcohol use: No     Family History   Problem Relation Age of Onset     Liver Disease Mother         Liver transplant      Asthma Brother      Asthma Brother          No current outpatient medications on file.     No Known Allergies  BP Readings from Last 3 Encounters:   10/29/19 128/80   09/10/19 133/80   09/09/19 119/78    Wt Readings from Last 3 Encounters:   10/29/19 63.1 kg (139 lb 1.6 oz)   09/10/19 62.5 kg (137 lb 12.8 oz)   09/09/19 64 kg (141 lb)                    Reviewed and updated as needed  "this visit by Provider         Review of Systems   ROS COMP: Constitutional, HEENT, cardiovascular, pulmonary, gi and gu systems are negative, except as otherwise noted.      Objective    /80   Pulse 78   Temp 98.6  F (37  C) (Oral)   Resp 16   Ht 1.6 m (5' 3\")   Wt 63.1 kg (139 lb 1.6 oz)   SpO2 100%   BMI 24.64 kg/m    Body mass index is 24.64 kg/m .  Physical Exam   GENERAL: healthy, alert and no distress  RESP: lungs clear to auscultation - no rales, rhonchi or wheezes  CV: regular rate and rhythm, normal S1 S2, no S3 or S4, no murmur, click or rub, no peripheral edema and peripheral pulses strong  ABDOMEN: soft, nontender, no hepatosplenomegaly, no masses and bowel sounds normal   (female): normal female external genitalia, normal urethral meatus, vaginal mucosa, normal cervix/adnexa/uterus without masses or discharge  MS: no gross musculoskeletal defects noted, no edema  PSYCH: mentation appears normal, affect normal/bright    Diagnostic Test Results:  Results for orders placed or performed in visit on 10/29/19 (from the past 24 hour(s))   CBC with platelets   Result Value Ref Range    WBC 6.4 4.0 - 11.0 10e9/L    RBC Count 4.38 3.8 - 5.2 10e12/L    Hemoglobin 13.1 11.7 - 15.7 g/dL    Hematocrit 39.6 35.0 - 47.0 %    MCV 90 78 - 100 fl    MCH 29.9 26.5 - 33.0 pg    MCHC 33.1 31.5 - 36.5 g/dL    RDW 13.3 10.0 - 15.0 %    Platelet Count 261 150 - 450 10e9/L   HCG Qual, Urine ([id number])   Result Value Ref Range    HCG Qual Urine Negative NEG^Negative   Wet prep   Result Value Ref Range    Specimen Description Vagina     Wet Prep No Trichomonas seen     Wet Prep Many  Clue cells seen   (A)     Wet Prep No yeast seen     Wet Prep Few  WBC'S seen              Assessment & Plan     1. Menometrorrhagia  Sounds like irregular menses and mucus plug from depo. Explained it should get better with time. hgb stable. Will consider additional treatment if symptoms persist. Patient will RTC if this is the " case.  - HCG Qual, Urine ([id number])  - CBC with platelets    2. Bacterial vaginosis  Clue cells on wet prep. Treatment below. Denies concern for STI.  - Wet prep  - metroNIDAZOLE (FLAGYL) 500 MG tablet; Take 1 tablet (500 mg) by mouth 2 times daily for 7 days  Dispense: 14 tablet; Refill: 0       See Patient Instructions    Start metronidazole today. No drinking alcohol while on this medication and for 2 days after.   Avoid douching and irritants such as bubble baths or strong soaps.  No intercourse until you have completed treatment.   If not improving in 5 days, follow up with primary care provider.    I suspect the mucus and irregular bleeding is from the depo. It should get better with time but can take a few months    Return in about 1 week (around 11/5/2019), or if symptoms worsen or fail to improve.    The benefits, risks and potential side effects were discussed in detail. Black box warnings discussed as relevant. All patient questions were answered. The patient was instructed to follow up immediately if any adverse reactions develop.    Return precautions discussed, including when to seek urgent/emergent care.    Patient verbalizes understanding and agrees with plan of care. Patient stable for discharge.      LEEANN Betancur [facility]

## 2019-12-02 ENCOUNTER — OFFICE VISIT (OUTPATIENT)
Dept: URGENT CARE | Facility: URGENT CARE | Age: 19
End: 2019-12-02
Payer: COMMERCIAL

## 2019-12-02 VITALS
HEART RATE: 75 BPM | BODY MASS INDEX: 24.27 KG/M2 | DIASTOLIC BLOOD PRESSURE: 83 MMHG | OXYGEN SATURATION: 97 % | RESPIRATION RATE: 20 BRPM | TEMPERATURE: 98.6 F | WEIGHT: 137 LBS | SYSTOLIC BLOOD PRESSURE: 130 MMHG

## 2019-12-02 DIAGNOSIS — T19.2XXA RETAINED TAMPON, INITIAL ENCOUNTER: ICD-10-CM

## 2019-12-02 DIAGNOSIS — N89.8 VAGINAL ODOR: Primary | ICD-10-CM

## 2019-12-02 DIAGNOSIS — B96.89 BACTERIAL VAGINOSIS: ICD-10-CM
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
## 2019-12-02 DIAGNOSIS — N76.0 BACTERIAL VAGINOSIS: ICD-10-CM

## 2019-12-02 LAB
SPECIMEN SOURCE: ABNORMAL
WET PREP SPEC: ABNORMAL

## 2019-12-02 PROCEDURE — 99213 OFFICE O/P EST LOW 20 MIN: CPT | Performed by: PHYSICIAN ASSISTANT

## 2019-12-02 PROCEDURE — 87210 SMEAR WET MOUNT SALINE/INK: CPT | Performed by: PHYSICIAN ASSISTANT

## 2019-12-02 RX ORDER — CLINDAMYCIN HCL 300 MG
300 CAPSULE ORAL 2 TIMES DAILY
Qty: 10 CAPSULE | Refills: 0 | Status: SHIPPED | OUTPATIENT
Start: 2019-12-02 | End: 2019-12-17

## 2019-12-02 ASSESSMENT — ENCOUNTER SYMPTOMS
MUSCULOSKELETAL NEGATIVE: 1
EYES NEGATIVE: 1
CONSTITUTIONAL NEGATIVE: 1
CARDIOVASCULAR NEGATIVE: 1
RESPIRATORY NEGATIVE: 1

## 2019-12-02 NOTE — PATIENT INSTRUCTIONS
Patient Education     Bacterial Vaginosis    You have a vaginal infection called bacterial vaginosis (BV). Both good and bad bacteria are present in a healthy vagina. BV occurs when these bacteria get out of balance. The number of bad bacteria increase. And the number of good bacteria decrease. Although BV is associated with sexual activity, it is not a sexually transmitted disease.  BV may or may not cause symptoms. If symptoms do occur, they can include:    Thin, gray, milky-white, or sometimes green discharge    Unpleasant odor or  fishy  smell    Itching, burning, or pain in or around the vagina  It is not known what causes BV, but certain factors can make the problem more likely. This can include:    Douching    Having sex with a new partner    Having sex with more than one partner  BV will sometimes go away on its own. But treatment is usually recommended. This is because untreated BV can increase the risk of more serious health problems such as:    Pelvic inflammatory disease (PID)     delivery (giving birth to a baby early if you re pregnant)    HIV and certain other sexually transmitted diseases (STDs)    Infection after surgery on the reproductive organs  Home care  General care    BV is most often treated with medicines called antibiotics. These may be given as pills or as a vaginal cream. If antibiotics are prescribed, be sure to use them exactly as directed. Also, be sure to complete all of the medicine, even if your symptoms go away.    Don't douche or having sex during treatment.    If you have sex with a female partner, ask your healthcare provider if she should also be treated.  Prevention    Don't douche.    Don't have sex. If you do have sex, then take steps to lower your risk:  ? Use condoms when having sex.  ? Limit the number of sexual partners you have.  Follow-up care  Follow up with your healthcare provider, or as advised.  When to seek medical advice  Call your healthcare provider  right away if:    You have a fever of 100.4 F (38 C) or higher, or as directed by your provider.    Your symptoms worsen, or they don t go away within a few days of starting treatment.    You have new pain in the lower belly or pelvic region.    You have side effects that bother you or a reaction to the pills or cream you re prescribed.    You or any partners you have sex with have new symptoms, such as a rash, joint pain, or sores.  Date Last Reviewed: 10/1/2017    3720-5806 [employer]. [address] [place] [address]. All rights reserved. This information is not intended as a substitute for professional medical care. Always follow your healthcare professional's instructions.           Patient Education     Vaginal Foreign Body, Removed (Adult)    Any object placed inside the vagina is called a vaginal foreign body. This includes tampons, birth control devices, and sex toys. In some cases, objects not designed for the vagina may be placed inside.  If an object is left inside the vagina too long or becomes stuck, it can cause symptoms. It can also lead to infection and damage nearby tissues.  Symptoms can include unusual or foul-smelling discharge. Bleeding, redness, swelling, or rash may also occur. Some women may feel pain or pressure in or around the vagina.  Treatment involves removing the object. Once the object is removed, symptoms should go away. If the object caused an infection, antibiotics may be given.  Home care    If you re prescribed any medicines, be sure to take them as directed.    Don t douche unless advised to by your provider.    Wait until all symptoms are gone before having sex.    Check with your provider before using tampons. If it s OK, remember to remove each tampon you use after 6 to 8 hours or sooner.    If you have new symptoms or any questions or concerns, contact your healthcare provider.  Follow-up care  Follow up with your healthcare provider, or as  advised.  When to seek medical advice  Call your healthcare provider right away if any of these occur:    Your symptoms don t improve or worsen.    You develop pain in the belly.    You have burning or pain during urination.    You have a fever of 100.4 F (38 C) or higher, or as directed by your provider.    You feel weak, dizzy, or faint.  Date Last Reviewed: 8/1/2017 2000-2018 [email or web address]. [address] [place] [address], [place] [address]. All rights reserved. This information is not intended as a substitute for professional medical care. Always follow your healthcare professional's instructions.

## 2019-12-02 NOTE — PROGRESS NOTES
SUBJECTIVE:   Cindy Xie is a 19 year old female presenting with a chief complaint of   Chief Complaint   Patient presents with     Vaginal Problem     Odor x1 fredi and discomfort x1 week       She is an established patient of [facility].    GYN Complaint    Onset of symptoms was never improved  Course of illness is same.    Severity moderate  Current and Associated symptoms: vaginal discharge described as scant, vaginal pain -  cramping and odor  Treatment measures tried include:  flagyl        Review of Systems   Constitutional: Negative.    HENT: Negative.    Eyes: Negative.    Respiratory: Negative.    Cardiovascular: Negative.    Genitourinary: Positive for vaginal discharge and vaginal pain.   Musculoskeletal: Negative.    Skin: Negative.    All other systems reviewed and are negative.      Past Medical History:   Diagnosis Date     History of malaria      Family History   Problem Relation Age of Onset     Liver Disease Mother         Liver transplant      Asthma Brother      Asthma Brother      Current Outpatient Medications   Medication Sig Dispense Refill     clindamycin (CLEOCIN) 300 MG capsule Take 1 capsule (300 mg) by mouth 2 times daily for 5 days 10 capsule 0     Social History     Tobacco Use     Smoking status: Never Smoker     Smokeless tobacco: Never Used   Substance Use Topics     Alcohol use: No       OBJECTIVE  /83   Pulse 75   Temp 98.6  F (37  C) (Oral)   Resp 20   Wt 62.1 kg (137 lb)   SpO2 97%   BMI 24.27 kg/m      Physical Exam  Vitals signs and nursing note reviewed.   Constitutional:       Appearance: Normal appearance. She is normal weight.   Eyes:      Extraocular Movements: Extraocular movements intact.      Conjunctiva/sclera: Conjunctivae normal.   Neck:      Musculoskeletal: Neck supple.   Cardiovascular:      Rate and Rhythm: Normal rate and regular rhythm.      Pulses: Normal pulses.      Heart sounds: Normal heart sounds.   Pulmonary:      Effort: Pulmonary  effort is normal.      Breath sounds: Normal breath sounds.   Genitourinary:     Comments: Tampon visible and retrieved without any difficulty.  No lesions or other abnormalities.  Musculoskeletal: Normal range of motion.   Skin:     General: Skin is warm and dry.      Capillary Refill: Capillary refill takes less than 2 seconds.   Neurological:      General: No focal deficit present.      Mental Status: She is alert and oriented to person, place, and time.   Psychiatric:         Mood and Affect: Mood normal.         Behavior: Behavior normal.         Labs:  Results for orders placed or performed in visit on 12/02/19 (from the past 24 hour(s))   Wet prep   Result Value Ref Range    Specimen Description Vagina     Wet Prep Clue cells seen  Few   (A)     Wet Prep WBC'S seen  Few       Wet Prep No Trichomonas seen     Wet Prep No yeast seen        X-Ray was not done.    ASSESSMENT:      ICD-10-CM    1. Vaginal odor N89.8 Wet prep   2. Bacterial vaginosis N76.0 clindamycin (CLEOCIN) 300 MG capsule    B96.89    3. Retained tampon, initial encounter T19.2XXA         Medical Decision Making:    Differential Diagnosis:  Gyn Problem: Vaginitis:  bacterial vaginosis and Retained tampon    Serious Comorbid Conditions:  Adult:  None    PLAN:    Gyn Problem:  Clindamycin      Followup:    If not improving or if condition worsens, follow up with your Primary Care Provider    Patient Instructions     Patient Education     Bacterial Vaginosis    You have a vaginal infection called bacterial vaginosis (BV). Both good and bad bacteria are present in a healthy vagina. BV occurs when these bacteria get out of balance. The number of bad bacteria increase. And the number of good bacteria decrease. Although BV is associated with sexual activity, it is not a sexually transmitted disease.  BV may or may not cause symptoms. If symptoms do occur, they can include:    Thin, gray, milky-white, or sometimes green discharge    Unpleasant odor or   fishy  smell    Itching, burning, or pain in or around the vagina  It is not known what causes BV, but certain factors can make the problem more likely. This can include:    Douching    Having sex with a new partner    Having sex with more than one partner  BV will sometimes go away on its own. But treatment is usually recommended. This is because untreated BV can increase the risk of more serious health problems such as:    Pelvic inflammatory disease (PID)     delivery (giving birth to a baby early if you re pregnant)    HIV and certain other sexually transmitted diseases (STDs)    Infection after surgery on the reproductive organs  Home care  General care    BV is most often treated with medicines called antibiotics. These may be given as pills or as a vaginal cream. If antibiotics are prescribed, be sure to use them exactly as directed. Also, be sure to complete all of the medicine, even if your symptoms go away.    Don't douche or having sex during treatment.    If you have sex with a female partner, ask your healthcare provider if she should also be treated.  Prevention    Don't douche.    Don't have sex. If you do have sex, then take steps to lower your risk:  ? Use condoms when having sex.  ? Limit the number of sexual partners you have.  Follow-up care  Follow up with your healthcare provider, or as advised.  When to seek medical advice  Call your healthcare provider right away if:    You have a fever of 100.4 F (38 C) or higher, or as directed by your provider.    Your symptoms worsen, or they don t go away within a few days of starting treatment.    You have new pain in the lower belly or pelvic region.    You have side effects that bother you or a reaction to the pills or cream you re prescribed.    You or any partners you have sex with have new symptoms, such as a rash, joint pain, or sores.  Date Last Reviewed: 10/1/2017    0616-9916 [employer]. [address] [facility], [place] [address]. All rights reserved. This information is not intended as a substitute for professional medical care. Always follow your healthcare professional's instructions.           Patient Education     Vaginal Foreign Body, Removed (Adult)    Any object placed inside the vagina is called a vaginal foreign body. This includes tampons, birth control devices, and sex toys. In some cases, objects not designed for the vagina may be placed inside.  If an object is left inside the vagina too long or becomes stuck, it can cause symptoms. It can also lead to infection and damage nearby tissues.  Symptoms can include unusual or foul-smelling discharge. Bleeding, redness, swelling, or rash may also occur. Some women may feel pain or pressure in or around the vagina.  Treatment involves removing the object. Once the object is removed, symptoms should go away. If the object caused an infection, antibiotics may be given.  Home care    If you re prescribed any medicines, be sure to take them as directed.    Don t douche unless advised to by your provider.    Wait until all symptoms are gone before having sex.    Check with your provider before using tampons. If it s OK, remember to remove each tampon you use after 6 to 8 hours or sooner.    If you have new symptoms or any questions or concerns, contact your healthcare provider.  Follow-up care  Follow up with your healthcare provider, or as advised.  When to seek medical advice  Call your healthcare provider right away if any of these occur:    Your symptoms don t improve or worsen.    You develop pain in the belly.    You have burning or pain during urination.    You have a fever of 100.4 F (38 C) or higher, or as directed by your provider.    You feel weak, dizzy, or faint.  Date Last Reviewed: 8/1/2017 2000-2018 [employer]. [address] [place] [address], [place] [address]. All rights reserved. This information is not intended as a substitute for professional medical  care. Always follow your healthcare professional's instructions.

## 2019-12-12 ENCOUNTER — OFFICE VISIT (OUTPATIENT)
Dept: FAMILY MEDICINE | Facility: CLINIC | Age: 19
End: 2019-12-12
Payer: COMMERCIAL

## 2019-12-12 VITALS
BODY MASS INDEX: 24.8 KG/M2 | DIASTOLIC BLOOD PRESSURE: 85 MMHG | TEMPERATURE: 98.2 F | WEIGHT: 140 LBS | SYSTOLIC BLOOD PRESSURE: 130 MMHG | HEIGHT: 63 IN | HEART RATE: 68 BPM | OXYGEN SATURATION: 100 %

## 2019-12-12 DIAGNOSIS — Z72.51 UNPROTECTED SEXUAL INTERCOURSE: Primary | ICD-10-CM

## 2019-12-12 DIAGNOSIS — Z30.09 ENCOUNTER FOR OTHER GENERAL COUNSELING OR ADVICE ON CONTRACEPTION: ICD-10-CM

## 2019-12-12 PROCEDURE — 99213 OFFICE O/P EST LOW 20 MIN: CPT | Performed by: NURSE PRACTITIONER

## 2019-12-12 RX ORDER — LEVONORGESTREL 1.5 MG/1
1.5 TABLET ORAL ONCE
Qty: 1 TABLET | Refills: 0 | Status: SHIPPED | OUTPATIENT
Start: 2019-12-12 | End: 2019-12-17

## 2019-12-12 ASSESSMENT — PAIN SCALES - GENERAL: PAINLEVEL: NO PAIN (0)

## 2019-12-12 ASSESSMENT — MIFFLIN-ST. JEOR: SCORE: 1369.17

## 2019-12-12 NOTE — PROGRESS NOTES
"Subjective     Cindy Xie is a 19 year old female who presents to clinic today for the following health issues:    HPI     Has been on Nexplanon and Depo.  She is 3 days overdue for next Depo.    Wants to change contraception from depo back to nexplanon.  Had placed originally 9/19/18.  Removed 7/23/2019.  Unable to tell me why she had it removed.  States she liked the method.  She did not experience abnormal bleeding, acne, weight gain, mood changes, or headaches.  Has noted mood changes since depo shot in September 2019.      Had unprotected intercourse last night.        Patient Active Problem List   Diagnosis     Health Care Home     Family history of liver failure     History reviewed. No pertinent surgical history.    Social History     Tobacco Use     Smoking status: Never Smoker     Smokeless tobacco: Never Used   Substance Use Topics     Alcohol use: Yes     Comment: occasional     Family History   Problem Relation Age of Onset     Liver Disease Mother         Liver transplant      Asthma Brother      Asthma Brother          Current Outpatient Medications   Medication Sig Dispense Refill     levonorgestrel (PLAN B) 1.5 MG tablet Take 1 tablet (1.5 mg) by mouth once for 1 dose 1 tablet 0     No Known Allergies      Reviewed and updated as needed this visit by Provider         Review of Systems   ROS COMP: Constitutional, HEENT, cardiovascular, pulmonary, gi and gu systems are negative, except as otherwise noted.      Objective    /85 (BP Location: Left arm, Patient Position: Chair, Cuff Size: Adult Regular)   Pulse 68   Temp 98.2  F (36.8  C) (Oral)   Ht 1.6 m (5' 3\")   Wt 63.5 kg (140 lb)   LMP 11/07/2019 (Approximate)   SpO2 100%   BMI 24.80 kg/m    Body mass index is 24.8 kg/m .  Physical Exam   GENERAL: healthy, alert and no distress  NECK: no adenopathy, no asymmetry, masses, or scars and thyroid normal to palpation  RESP: lungs clear to auscultation - no rales, rhonchi or " wheezes  CV: regular rate and rhythm, normal S1 S2, no S3 or S4, no murmur, click or rub, no peripheral edema and peripheral pulses strong  ABDOMEN: soft, nontender, no hepatosplenomegaly, no masses and bowel sounds normal  MS: no gross musculoskeletal defects noted, no edema    Diagnostic Test Results:  none         Assessment & Plan     1. Unprotected sexual intercourse  Unlikely that intercourse just two days outside of depo window would be an issue but to be sure, patient to take below.    - levonorgestrel (PLAN B) 1.5 MG tablet; Take 1 tablet (1.5 mg) by mouth once for 1 dose  Dispense: 1 tablet; Refill: 0    2. Encounter for other general counseling or advice on contraception  Scheduled for nexplanon insertion.         Patient Instructions   . Take plan b today.  Come back Monday for nexplanon placement.  Condoms until then.        Return in about 3 days (around 12/15/2019) for Follow Up.    LEEANN Delgado [facility]

## 2019-12-17 ENCOUNTER — OFFICE VISIT (OUTPATIENT)
Dept: FAMILY MEDICINE | Facility: CLINIC | Age: 19
End: 2019-12-17
Payer: COMMERCIAL

## 2019-12-17 VITALS
HEART RATE: 89 BPM | HEIGHT: 63 IN | RESPIRATION RATE: 16 BRPM | BODY MASS INDEX: 24.98 KG/M2 | DIASTOLIC BLOOD PRESSURE: 85 MMHG | WEIGHT: 141 LBS | OXYGEN SATURATION: 100 % | SYSTOLIC BLOOD PRESSURE: 121 MMHG | TEMPERATURE: 98.1 F

## 2019-12-17 DIAGNOSIS — Z30.017 ENCOUNTER FOR INITIAL PRESCRIPTION OF IMPLANTABLE SUBDERMAL CONTRACEPTIVE: Primary | ICD-10-CM

## 2019-12-17 DIAGNOSIS — Z97.5 NEXPLANON IN PLACE: ICD-10-CM

## 2019-12-17 LAB — HCG UR QL: NEGATIVE

## 2019-12-17 PROCEDURE — 99207 ZZC DROP WITH A PROCEDURE: CPT | Performed by: NURSE PRACTITIONER

## 2019-12-17 PROCEDURE — 11981 INSERTION DRUG DLVR IMPLANT: CPT | Performed by: NURSE PRACTITIONER

## 2019-12-17 PROCEDURE — 81025 URINE PREGNANCY TEST: CPT | Performed by: NURSE PRACTITIONER

## 2019-12-17 ASSESSMENT — MIFFLIN-ST. JEOR: SCORE: 1373.7

## 2019-12-17 NOTE — PROGRESS NOTES
"Subjective     Cindy Xie is a 19 year old female who presents to clinic today for the following health issues:    HPI   Patient is here today for Nexplanon insertion. Took plan b on 12/12 due to having unprotected intercourse 3 days outside her depo window.  No menses since then.  No unprotected intercourse since that time.        Patient Active Problem List   Diagnosis     Health Care Home     Family history of liver failure     History reviewed. No pertinent surgical history.    Social History     Tobacco Use     Smoking status: Never Smoker     Smokeless tobacco: Never Used   Substance Use Topics     Alcohol use: Yes     Comment: occasional     Family History   Problem Relation Age of Onset     Liver Disease Mother         Liver transplant      Asthma Brother      Asthma Brother          Current Outpatient Medications   Medication Sig Dispense Refill     etonogestrel (IMPLANON/NEXPLANON) 68 MG IMPL 1 each (68 mg) by Subdermal route continuous       No Known Allergies  BP Readings from Last 3 Encounters:   12/17/19 121/85   12/12/19 130/85   12/02/19 130/83    Wt Readings from Last 3 Encounters:   12/17/19 64 kg (141 lb)   12/12/19 63.5 kg (140 lb)   12/02/19 62.1 kg (137 lb)                      Reviewed and updated as needed this visit by Provider               Objective    /85 (BP Location: Left arm, Patient Position: Sitting, Cuff Size: Adult Regular)   Pulse 89   Temp 98.1  F (36.7  C) (Oral)   Resp 16   Ht 1.6 m (5' 3\")   Wt 64 kg (141 lb)   LMP 11/07/2019 (Approximate)   SpO2 100%   BMI 24.98 kg/m    Body mass index is 24.98 kg/m .      Diagnostic Test Results:  Results for orders placed or performed in visit on 12/17/19 (from the past 24 hour(s))   HCG Qual, Urine ([id number])   Result Value Ref Range    HCG Qual Urine Negative NEG^Negative           Assessment & Plan     1. Encounter for initial prescription of implantable subdermal contraceptive  See procedure note.    - HCG Qual, Urine " ([id number])  - etonogestrel (IMPLANON/NEXPLANON) subdermal implant 68 mg  - etonogestrel (IMPLANON/NEXPLANON) 68 MG IMPL; 1 each (68 mg) by Subdermal route continuous  - INSERTION NON-BIODEGRADABLE DRUG DELIVERY IMPLANT       Patient Instructions     We placed the Nexplanon today.  Congratulations on choosing such an effective birth control!  -Use condoms for the next 7 days, after that, you are protected against pregnancy.  You should still use condoms to protect against  sexually transmitted infections.  -Remove the gauze dressing tomorrow, you can shower normally at that time.  -The little white bandaids will fall off after about one week.  This is fine.  -There should be minimal scarring; if you have any redness, tenderness, or drainage from the site, call right away.  -Remember to expect irregular bleeding, this is a normal side effect of Nexplanon  -Call with any questions or concerns!      At [facility], we strive to deliver an exceptional experience to you, every time we see you. If you receive a survey, please complete it as we do value your feedback.  If you have MyChart, you can expect to receive results automatically within 24 hours of their completion.  Your provider will send a note interpreting your results as well.   If you do not have MyChart, you should receive your results in about a week by mail.    Your care team:     Family Medicine   KENISHA Chatman MD Emily Bunt, APRN CNP   S. MD Hali Nicholson MD Angela Wermerskirchen, MD    Internal Medicine  Sudarshan Britton MD coming 2020     Clinic hours: Monday - Wednesday 7 am-7 pm   Thursdays and Fridays 7 am-5 pm.     [facility]: Monday - Friday 11 am-9 pm,   Saturday and Sunday 9 am-5 pm.    [facility]: Monday -Thursday 8 am-8 pm; Friday 8 am-6 pm; Saturday and Sunday 9 am-5 pm.     [facility]: Monday - Thursday 8 am - 7 pm; Friday 8 am  - 6 pm    Clinic: [phone]   [facility]: [phone] [facility]: [phone]                Return in about 3 months (around 3/17/2020) for Follow Up.    LEEANN Delgado [facility]

## 2019-12-17 NOTE — PATIENT INSTRUCTIONS
We placed the Nexplanon today.  Congratulations on choosing such an effective birth control!  -Use condoms for the next 7 days, after that, you are protected against pregnancy.  You should still use condoms to protect against  sexually transmitted infections.  -Remove the gauze dressing tomorrow, you can shower normally at that time.  -The little white bandaids will fall off after about one week.  This is fine.  -There should be minimal scarring; if you have any redness, tenderness, or drainage from the site, call right away.  -Remember to expect irregular bleeding, this is a normal side effect of Nexplanon  -Call with any questions or concerns!      At [facility], we strive to deliver an exceptional experience to you, every time we see you. If you receive a survey, please complete it as we do value your feedback.  If you have MyChart, you can expect to receive results automatically within 24 hours of their completion.  Your provider will send a note interpreting your results as well.   If you do not have MyChart, you should receive your results in about a week by mail.    Your care team:     Family Medicine   KENISHA Chatman MD Emily Bunt, APRN CNP   S. MD Hali Nicholson MD Angela Wermerskirchen, MD    Internal Medicine  Sudarshan Britton MD coming 2020     Clinic hours: Monday - Wednesday 7 am-7 pm   Thursdays and Fridays 7 am-5 pm.     [facility]: Monday - Friday 11 am-9 pm,   Saturday and Sunday 9 am-5 pm.    [facility]: Monday -Thursday 8 am-8 pm; Friday 8 am-6 pm; Saturday and Sunday 9 am-5 pm.     [facility]: Monday - Thursday 8 am - 7 pm; Friday 8 am - 6 pm    Clinic: [phone]   [facility]: [phone]   [facility]: [phone]

## 2019-12-17 NOTE — PROCEDURES
Procedure Note:     Placement of Nexplanon for birth control    Informed consent reviewed and obtained. Discussed expected bleeding pattern with Nexplanon.  Patient understands and would like to proceed. The patient was positioned with her left arm flexed at the elbow.  A point 10 cm from the epicondyle was marked on the inner arm.  This area was cleansed with betadine and then injected with 3 cc of 1% lidocaine.  The Nexplanon device was placed according to 's recommendations  The device was palpable in the appropriate place under the skin by both myself and the patient.  Site was covered with steri strips and a gauze bandage.    There were no complications and minimal blood loss.  Patient tolerated procedure well.    LOT:  [id number]  Exp:  2/19/2022    NDC:  2639-8589-73

## 2020-02-23 ENCOUNTER — HEALTH MAINTENANCE LETTER (OUTPATIENT)
Age: 20
End: 2020-02-23

## 2020-08-06 ENCOUNTER — VIRTUAL VISIT (OUTPATIENT)
Dept: FAMILY MEDICINE | Facility: CLINIC | Age: 20
End: 2020-08-06
Payer: COMMERCIAL

## 2020-08-06 ENCOUNTER — TELEPHONE (OUTPATIENT)
Dept: FAMILY MEDICINE | Facility: CLINIC | Age: 20
End: 2020-08-06

## 2020-08-06 DIAGNOSIS — N39.0 ACUTE UTI: Primary | ICD-10-CM

## 2020-08-06 PROCEDURE — 99213 OFFICE O/P EST LOW 20 MIN: CPT | Mod: 95 | Performed by: FAMILY MEDICINE

## 2020-08-06 RX ORDER — SULFAMETHOXAZOLE/TRIMETHOPRIM 800-160 MG
1 TABLET ORAL 2 TIMES DAILY
Qty: 6 TABLET | Refills: 0 | Status: SHIPPED | OUTPATIENT
Start: 2020-08-06 | End: 2020-08-09

## 2020-08-06 NOTE — TELEPHONE ENCOUNTER
Spoke with patient and she has been having some pelvic pain at the end or urinating for 3 days now. Denies fever, trauma, eating anything differently and states it just came on. This happens every time she goes to the bathroom. Patient scheduled today for virtual visit.        Brit Chandler RN, BSN, PHN

## 2020-08-06 NOTE — TELEPHONE ENCOUNTER
Reason for call:  Patient reporting a symptom    Symptom or request: Symptoms    Duration (how long have symptoms been present): on going    Have you been treated for this before? Yes    Additional comments: Pt is having vaginal type symptoms  that she would like a call back to discuss further     Phone Number patient can be reached at:  Home number on file [phone] (home)    Best Time:  anytime    Can we leave a detailed message on this number:  YES    Call taken on 8/6/2020 at 10:05 AM by Greg Olivas

## 2020-08-06 NOTE — PROGRESS NOTES
"Cindy Xie is a 20 year old female who is being evaluated via a billable video visit.      The patient has been notified of following:     \"This video visit will be conducted via a call between you and your physician/provider. We have found that certain health care needs can be provided without the need for an in-person physical exam.  This service lets us provide the care you need with a video conversation.  If a prescription is necessary we can send it directly to your pharmacy.  If lab work is needed we can place an order for that and you can then stop by our lab to have the test done at a later time.    Video visits are billed at different rates depending on your insurance coverage.  Please reach out to your insurance provider with any questions.    If during the course of the call the physician/provider feels a video visit is not appropriate, you will not be charged for this service.\"    Patient has given verbal consent for Video visit? Yes  How would you like to obtain your AVS? MyChart  If you are dropped from the video visit, the video invite should be resent to: Text to cell phone: [phone]  Will anyone else be joining your video visit? No    Subjective     Cindy Xie is a 20 year old female who presents today via video visit for the following health issues:    HPI    URINARY TRACT SYMPTOMS  Onset: 3 days    Description:   Painful urination (Dysuria): YES           Frequency: no   Blood in urine (Hematuria): YES  Delay in urine (Hesitency): YES    Intensity: 5/10    Progression of Symptoms:  same    Accompanying Signs & Symptoms:  Fever/chills: no   Flank pain no   Nausea and vomiting: no   Any vaginal symptoms: none  Abdominal/Pelvic Pain: YES    History:   History of frequent UTI's: no   History of kidney stones: no   Sexually Active: YES  Possibility of pregnancy: No    Precipitating factors:   none    Therapies Tried and outcome: Increase fluid intake     Video Start Time: 3:41 " "PM    Patient is preparing to travel and would like this sent to her next destination.    Reviewed and updated as needed this visit by Provider  Tobacco  Allergies  Meds  Problems  Med Hx  Surg Hx  Fam Hx         Review of Systems   Constitutional, HEENT, cardiovascular, pulmonary, gi and gu systems are negative, except as otherwise noted.      Objective    Vitals - Patient Reported  Weight (Patient Reported): 68 kg (150 lb)  Height (Patient Reported): 160 cm (5' 3\")  BMI (Based on Pt Reported Ht/Wt): 26.57  Temperature (Patient Reported): 98.5  F (36.9  C)  Pain Score: No Pain (0)        Physical Exam     GENERAL: Healthy, alert and no distress  EYES: Eyes grossly normal to inspection.  No discharge or erythema, or obvious scleral/conjunctival abnormalities.  RESP: No audible wheeze, cough, or visible cyanosis.  No visible retractions or increased work of breathing.    SKIN: Visible skin clear. No significant rash, abnormal pigmentation or lesions.  NEURO: Cranial nerves grossly intact.  Mentation and speech appropriate for age.  PSYCH: Mentation appears normal, affect normal/bright, judgement and insight intact, normal speech and appearance well-groomed.      Diagnostic Test Results:  Labs reviewed in Epic        Assessment & Plan     1. Acute UTI  Oral abx treatment based on symptoms  - sulfamethoxazole-trimethoprim (BACTRIM DS) 800-160 MG tablet; Take 1 tablet by mouth 2 times daily for 3 days  Dispense: 6 tablet; Refill: 0       The uses and side effects, including black box warnings as appropriate, were discussed in detail.  All patient questions were answered.  The patient was instructed to call immediately if any side effects developed.     Return in about 3 days (around 8/9/2020), or if symptoms worsen or fail to improve.    Marjorie Duff MD  [facility]      Video-Visit Details    Type of service:  Video Visit    Video End Time:3:47 PM    Originating Location (pt. " Location): Home    Distant Location (provider location):  [facility]     Platform used for Video Visit: AmWell    Return in about 3 days (around 8/9/2020), or if symptoms worsen or fail to improve.       Marjorie Duff MD

## 2020-08-06 NOTE — PATIENT INSTRUCTIONS
At [facility], we strive to deliver an exceptional experience to you, every time we see you. If you receive a survey, please complete it as we do value your feedback.  If you have MyChart, you can expect to receive results automatically within 24 hours of their completion.  Your provider will send a note interpreting your results as well.   If you do not have MyChart, you should receive your results in about a week by mail.    Your care team:                            Family Medicine Internal Medicine   MD Randall Espino, MD Sudarshan Sin MD Katya Georgiev PA-C Megan Hill, APRN CNP    Berry Burns, MD Pediatrics   Kike Mojica, PAFernandoC  Gogo Aguilar, CNP MD Xiomy Phan APRN CNP   MD Linda Matta MD Deborah Mielke, MD Kristi Armando, APRN CNP  Sharron Young, PAFernandoC  Tiffani Ellison, CNP  MD Hali Nicholson MD Angela Wermerskirchen, MD      Clinic hours: Monday - Thursday 7 am-7 pm; Fridays 7 am-5 pm.   Urgent care: Monday - Friday 11 am-9 pm; Saturday and Sunday 9 am-5 pm.    Clinic: [phone]       [facility]: Monday - Thursday 8 am - 7 pm; Friday 8 am - 6 pm  [facility]: [phone]     Use [email or web address] for 24/7 diagnosis and treatment of dozens of conditions.    Patient Education     Bladder Infection, Female (Adult)    Urine is normally doesn't have any bacteria in it. But bacteria can get into the urinary tract from the skin around the rectum. Or they can travel in the blood from elsewhere in the body. Once they are in your urinary tract, they can cause infection in the urethra (urethritis), the bladder (cystitis), or the kidneys (pyelonephritis).  The most common place for an infection is in the bladder. This is called a bladder infection. This is one of the most common infections in women. Most bladder  "infections are easily treated. They are not serious unless the infection spreads to the kidney.  The phrases \"bladder infection,\" \"UTI,\" and \"cystitis\" are often used to describe the same thing. But they are not always the same. Cystitis is an inflammation of the bladder. The most common cause of cystitis is an infection.  Symptoms  The infection causes inflammation in the urethra and bladder. This causes many of the symptoms. The most common symptoms of a bladder infection are:    Pain or burning when urinating    Having to urinate more often than usual    Urgent need to urinate    Only a small amount of urine comes out    Blood in urine    Abdominal discomfort. This is usually in the lower abdomen above the pubic bone.    Cloudy urine    Strong- or bad-smelling urine    Unable to urinate (urinary retention)    Unable to hold urine in (urinary incontinence)    Fever    Loss of appetite    Confusion (in older adults)  Causes  Bladder infections are not contagious. You can't get one from someone else, from a toilet seat, or from sharing a bath.  The most common cause of bladder infections is bacteria from the bowels. The bacteria get onto the skin around the opening of the urethra. From there, they can get into the urine and travel up to the bladder, causing inflammation and infection. This usually happens because of:    Wiping improperly after urinating. Always wipe from front to back.    Bowel incontinence    Pregnancy    Procedures such as having a catheter inserted    Older age    Not emptying your bladder. This can allow bacteria a chance to grow in your urine.    Dehydration    Constipation    Sex    Use of a diaphragm for birth control   Treatment  Bladder infections are diagnosed by a urine test. They are treated with antibiotics and usually clear up quickly without complications. Treatment helps prevent a more serious kidney infection.  Medicines  Medicines can help in the treatment of a bladder " infection:    Take antibiotics until they are used up, even if you feel better. It is important to finish them to make sure the infection has cleared.    You can use acetaminophen or ibuprofen for pain, fever, or discomfort, unless another medicine was prescribed. If you have chronic liver or kidney disease, talk with your healthcare provider before using these medicines. Also talk with your provider if you've ever had a stomach ulcer or gastrointestinal bleeding, or are taking blood-thinner medicines.    If you are given phenazopydridine to reduce burning with urination, it will cause your urine to become a bright orange color. This can stain clothing.  Care and prevention  These self-care steps can help prevent future infections:    Drink plenty of fluids to prevent dehydration and flush out your bladder. Do this unless you must restrict fluids for other health reasons, or your doctor told you not to.    Proper cleaning after going to the bathroom is important. Wipe from front to back after using the toilet to prevent the spread of bacteria.    Urinate more often. Don't try to hold urine in for a long time.    Wear loose-fitting clothes and cotton underwear. Avoid tight-fitting pants.    Improve your diet and prevent constipation. Eat more fresh fruit and vegetables, and fiber, and less junk and fatty foods.    Avoid sex until your symptoms are gone.    Avoid caffeine, alcohol, and spicy foods. These can irritate your bladder.    Urinate right after intercourse to flush out your bladder.    If you use birth control pills and have frequent bladder infections, discuss it with your doctor.  Follow-up care  Call your healthcare provider if all symptoms are not gone after 3 days of treatment. This is especially important if you have repeat infections.  If a culture was done, you will be told if your treatment needs to be changed. If directed, you can call to find out the results.  If X-rays were done, you will be told  if the results will affect your treatment.  Call 911  Call 911 if any of the following occur:    Trouble breathing    Hard to wake up or confusion    Fainting or loss of consciousness    Rapid heart rate  When to seek medical advice  Call your healthcare provider right away if any of these occur:    Fever of 100.4 F (38.0 C) or higher, or as directed by your healthcare provider    Symptoms are not better by the third day of treatment    Back or belly (abdominal) pain that gets worse    Repeated vomiting, or unable to keep medicine down    Weakness or dizziness    Vaginal discharge    Pain, redness, or swelling in the outer vaginal area (labia)  Date Last Reviewed: 10/1/2016    6426-8445 [employer]. [address] [place] [address], [place] [address]. All rights reserved. This information is not intended as a substitute for professional medical care. Always follow your healthcare professional's instructions.

## 2020-09-30 ENCOUNTER — VIRTUAL VISIT (OUTPATIENT)
Dept: FAMILY MEDICINE | Facility: CLINIC | Age: 20
End: 2020-09-30
Payer: COMMERCIAL

## 2020-09-30 DIAGNOSIS — F32.0 CURRENT MILD EPISODE OF MAJOR DEPRESSIVE DISORDER WITHOUT PRIOR EPISODE (H): ICD-10-CM

## 2020-09-30 DIAGNOSIS — F41.9 ANXIETY: ICD-10-CM

## 2020-09-30 DIAGNOSIS — R63.5 WEIGHT GAIN: Primary | ICD-10-CM

## 2020-09-30 PROCEDURE — 96127 BRIEF EMOTIONAL/BEHAV ASSMT: CPT | Mod: 95 | Performed by: FAMILY MEDICINE

## 2020-09-30 PROCEDURE — 96127 BRIEF EMOTIONAL/BEHAV ASSMT: CPT | Mod: 59 | Performed by: FAMILY MEDICINE

## 2020-09-30 PROCEDURE — 99214 OFFICE O/P EST MOD 30 MIN: CPT | Mod: 95 | Performed by: FAMILY MEDICINE

## 2020-09-30 ASSESSMENT — ANXIETY QUESTIONNAIRES
6. BECOMING EASILY ANNOYED OR IRRITABLE: SEVERAL DAYS
GAD7 TOTAL SCORE: 5
7. FEELING AFRAID AS IF SOMETHING AWFUL MIGHT HAPPEN: SEVERAL DAYS
IF YOU CHECKED OFF ANY PROBLEMS ON THIS QUESTIONNAIRE, HOW DIFFICULT HAVE THESE PROBLEMS MADE IT FOR YOU TO DO YOUR WORK, TAKE CARE OF THINGS AT HOME, OR GET ALONG WITH OTHER PEOPLE: NOT DIFFICULT AT ALL
1. FEELING NERVOUS, ANXIOUS, OR ON EDGE: SEVERAL DAYS
3. WORRYING TOO MUCH ABOUT DIFFERENT THINGS: SEVERAL DAYS
5. BEING SO RESTLESS THAT IT IS HARD TO SIT STILL: NOT AT ALL
2. NOT BEING ABLE TO STOP OR CONTROL WORRYING: SEVERAL DAYS

## 2020-09-30 ASSESSMENT — PATIENT HEALTH QUESTIONNAIRE - PHQ9
SUM OF ALL RESPONSES TO PHQ QUESTIONS 1-9: 10
5. POOR APPETITE OR OVEREATING: NOT AT ALL

## 2020-09-30 NOTE — PATIENT INSTRUCTIONS
I would recommend some lab testing to evaluate for other causes for your weight gain and mood concerns.  A separate [email or web address] message will be sent that you can print for taking to the lab.  They should then send results to me and I will let you know over my chart those results when available.    As we discussed use of a medication for the anxiety may minimize the symptoms while we are awaiting the lab results.  Sertraline (50 mg) is prescribed to take half pill daily for 1 week and then increase to 1 pill daily.  This is usually best taken in the morning as it should not cause sleepiness.      Follow-up was recommended in 4 weeks to reassess mood symptoms.  This can be done as an E-visit, telephone visit, or video visit.

## 2020-09-30 NOTE — PROGRESS NOTES
"Cindy Xie is a 20 year old female who is being evaluated via a billable video visit.      The patient has been notified of following:     \"This video visit will be conducted via a call between you and your physician/provider. We have found that certain health care needs can be provided without the need for an in-person physical exam.  This service lets us provide the care you need with a video conversation.  If a prescription is necessary we can send it directly to your pharmacy.  If lab work is needed we can place an order for that and you can then stop by our lab to have the test done at a later time.    Video visits are billed at different rates depending on your insurance coverage.  Please reach out to your insurance provider with any questions.    If during the course of the call the physician/provider feels a video visit is not appropriate, you will not be charged for this service.\"    Patient has given verbal consent for Video visit? Yes  How would you like to obtain your AVS? MyChart  If you are dropped from the video visit, the video invite should be resent to: Text to cell phone: [phone]  Will anyone else be joining your video visit? No        Video Start Time: 9:02 am  Subjective     Cnidy Xie is a 20 year old female who presents today via video visit for the following health issues:    HPI    Patient is seen today with a video visit with complaints of weight gain with increased appetite and mood changes.  She states that since early August she has gone from 150 pounds to 163 pounds.  She does think she is overeating.  Has not been as active as previous.  Is finding more anxiety symptoms and reports hard to focus on her assignments.  She is doing her schooling virtually.  She does deny feeling anxious related to the current COVID pandemic and political unrest.  Does not report previous symptoms such as these.  She is concerned the symptoms are related to her birth control.  In December " she had Nexplanon placed.  Feels like she is tolerating it well otherwise.  She did have Nexplanon back in 2017 to June 2018 and was replaced in September 2018 through July 2019.  Current Nexplanon was placed December 2019.  Reports she did not have these symptoms with the previous Nexplanon use.    PHQ 9/30/2020   PHQ-9 Total Score 10   Q9: Thoughts of better off dead/self-harm past 2 weeks Not at all     TRAVIS-7 SCORE 9/30/2020   Total Score 5                Review of Systems   Constitutional, HEENT, cardiovascular, pulmonary, gi and gu systems are negative, except as otherwise noted.      Objective           Vitals:  No vitals were obtained today due to virtual visit.    Physical Exam     GENERAL: Healthy, alert and no distress  EYES: Eyes grossly normal to inspection.  No discharge or erythema, or obvious scleral/conjunctival abnormalities.  RESP: No audible wheeze, cough, or visible cyanosis.  No visible retractions or increased work of breathing.    SKIN: Visible skin clear. No significant rash, abnormal pigmentation or lesions.  NEURO: Cranial nerves grossly intact.  Mentation and speech appropriate for age.  PSYCH: mentation appears normal, affect flat, anxious, judgement and insight intact and appearance well groomed              Assessment & Plan     Weight gain  13 pounds of weight gain over the last 1- 2 months.  Energy available relatively well-maintained.  Mood disturbances noted above.  She has not had weight gain with the Nexplanon previously.  We discussed the potential for lab testing.  She is able to come to our clinic for lab testing but will find a clinic at her school to have this done.  I have sent her a [employer] message with lab orders noted.  She will notify us if she has difficulty scheduling.    Anxiety  Current mild episode of major depressive disorder without prior episode (H)  Mood disturbance as noted.  She states she tends to be anxious anyway.  She denies feeling depressed and denies  thoughts of harm to herself or others.  Starting Zoloft is recommended with plans for follow-up if symptoms worsen or in 4 to 6 weeks to determine response.  - sertraline (ZOLOFT) 50 MG tablet; Take 0.5 tablets (25 mg) by mouth daily for 7 days, THEN 1 tablet (50 mg) daily for 23 days.               Follow Up    Patient Instructions   I would recommend some lab testing to evaluate for other causes for your weight gain and mood concerns.  A separate [employer] message will be sent that you can print for taking to the lab.  They should then send results to me and I will let you know over my chart those results when available.    As we discussed use of a medication for the anxiety may minimize the symptoms while we are awaiting the lab results.  Sertraline (50 mg) is prescribed to take half pill daily for 1 week and then increase to 1 pill daily.  This is usually best taken in the morning as it should not cause sleepiness.      Follow-up was recommended in 4 weeks to reassess mood symptoms.  This can be done as an E-visit, telephone visit, or video visit.      Return in about 4 weeks (around 10/28/2020) for recheck mood.    Hali Lindsey MD  [facility]      Video-Visit Details    Type of service:  Video Visit    Video End Time:9:25 AM    Originating Location (pt. Location): Home    Distant Location (provider location):  [facility]     Platform used for Video Visit: [employer]

## 2020-10-01 ASSESSMENT — ANXIETY QUESTIONNAIRES: GAD7 TOTAL SCORE: 5

## 2020-11-27 ENCOUNTER — VIRTUAL VISIT (OUTPATIENT)
Dept: FAMILY MEDICINE | Facility: CLINIC | Age: 20
End: 2020-11-27
Payer: COMMERCIAL

## 2020-11-27 DIAGNOSIS — F32.1 MAJOR DEPRESSIVE DISORDER, SINGLE EPISODE, MODERATE (H): Primary | ICD-10-CM

## 2020-11-27 DIAGNOSIS — F41.9 ANXIETY: ICD-10-CM

## 2020-11-27 PROCEDURE — 99214 OFFICE O/P EST MOD 30 MIN: CPT | Mod: 95 | Performed by: FAMILY MEDICINE

## 2020-11-27 ASSESSMENT — PATIENT HEALTH QUESTIONNAIRE - PHQ9
SUM OF ALL RESPONSES TO PHQ QUESTIONS 1-9: 11
5. POOR APPETITE OR OVEREATING: SEVERAL DAYS

## 2020-11-27 ASSESSMENT — ANXIETY QUESTIONNAIRES
GAD7 TOTAL SCORE: 10
6. BECOMING EASILY ANNOYED OR IRRITABLE: MORE THAN HALF THE DAYS
3. WORRYING TOO MUCH ABOUT DIFFERENT THINGS: MORE THAN HALF THE DAYS
2. NOT BEING ABLE TO STOP OR CONTROL WORRYING: MORE THAN HALF THE DAYS
5. BEING SO RESTLESS THAT IT IS HARD TO SIT STILL: NOT AT ALL
7. FEELING AFRAID AS IF SOMETHING AWFUL MIGHT HAPPEN: SEVERAL DAYS
IF YOU CHECKED OFF ANY PROBLEMS ON THIS QUESTIONNAIRE, HOW DIFFICULT HAVE THESE PROBLEMS MADE IT FOR YOU TO DO YOUR WORK, TAKE CARE OF THINGS AT HOME, OR GET ALONG WITH OTHER PEOPLE: VERY DIFFICULT
1. FEELING NERVOUS, ANXIOUS, OR ON EDGE: MORE THAN HALF THE DAYS

## 2020-11-27 NOTE — PROGRESS NOTES
"Cindy Xie is a 20 year old female who is being evaluated via a billable video visit.       The patient has been notified of following:      \"This video visit will be conducted via a call between you and your physician. We have found that certain health care needs can be provided without the need for an in-person physical exam.  This service lets us provide the care you need with a video conversation.  If a prescription is necessary we can send it directly to your pharmacy.  If lab work is needed we can place an order for that, and you can then schedule a lab appointment in the near future.     Video visits are billed at different rates depending on your insurance coverage.  Please reach out to your insurance provider with any questions. If during the course of the call the physician feels a video visit is not appropriate, you will not be charged for this service.\"     \"Do you [verbally] consent to proceed with the Video visit? Yes  How would you like to obtain your AVS? MyChart  If you are dropped from the video visit, the video invite should be resent to cell phone: [phone]  Will anyone else be joining your video visit? no     SUBJECTIVE:    Cindy Xie is a 20 year old female who prevents via video visit today for the following issue(s):    HPI:    Depression and Anxiety Follow-Up    How are you doing with your depression since your last visit? Improved for a while then a little worse    How are you doing with your anxiety since your last visit?  No change    Are you having other symptoms that might be associated with depression or anxiety? No    Have you had a significant life event? Grief or Loss (had a death in the family since her last visit)     Do you have any concerns with your use of alcohol or other drugs? No    Social History     Tobacco Use     Smoking status: Never Smoker     Smokeless tobacco: Never Used   Substance Use Topics     Alcohol use: Yes     Comment: occasional     Drug use: " No     PHQ 9/30/2020 11/27/2020   PHQ-9 Total Score 10 11   Q9: Thoughts of better off dead/self-harm past 2 weeks Not at all Several days     TRAVIS-7 SCORE 9/30/2020 11/27/2020   Total Score 5 10     Last PHQ-9 11/27/2020   1.  Little interest or pleasure in doing things 1   2.  Feeling down, depressed, or hopeless 1   3.  Trouble falling or staying asleep, or sleeping too much 2   4.  Feeling tired or having little energy 2   5.  Poor appetite or overeating 2   6.  Feeling bad about yourself 1   7.  Trouble concentrating 1   8.  Moving slowly or restless 0   Q9: Thoughts of better off dead/self-harm past 2 weeks 1   PHQ-9 Total Score 11   Difficulty at work, home, or with people Very difficult     TRAVIS-7  11/27/2020   1. Feeling nervous, anxious, or on edge 2   2. Not being able to stop or control worrying 2   3. Worrying too much about different things 2   4. Trouble relaxing 1   5. Being so restless that it is hard to sit still 0   6. Becoming easily annoyed or irritable 2   7. Feeling afraid, as if something awful might happen 1   TRAVIS-7 Total Score 10   If you checked any problems, how difficult have they made it for you to do your work, take care of things at home, or get along with other people? Very difficult     Past medical, family, and social histories, medications, and allergies are reviewed and updated in Epic.  Allergies: Patient has no allergy information on record.      Objective:           There were no vitals taken for this visit.   GENERAL: Healthy, alert and no distress  EYES: Eyes grossly normal to inspection.  No discharge or erythema, or obvious scleral/conjunctival abnormalities.  RESP: No audible wheeze, cough, or visible cyanosis.  No visible retractions or increased work of breathing.    SKIN: Visible skin clear. No significant rash, abnormal pigmentation or lesions.  NEURO: Cranial nerves grossly intact.  Mentation and speech appropriate for age.  PSYCH: Mentation appears normal, affect  normal/bright, judgement and insight intact, normal speech and appearance well-groomed.      =====    ASSESSMENT/PLAN:  (F32.1) Major depressive disorder, single episode, moderate (H)  (primary encounter diagnosis)  Comment: Improved for a while after starting SSRI, but then worse with a death in the family.  She is not suicidal, but occasionally has thoughts about what it would be like if she were not around.  Plan: sertraline (ZOLOFT) 50 MG tablet        Depression Action Plan included in the AVS.  Increase sertraline to 75 mg daily for 1 week, then 100 mg daily. Return in about 5 weeks (around 1/1/2021) for recheck medications.      (F41.9) Anxiety  Comment: Probably similar response  Plan: sertraline (ZOLOFT) 50 MG tablet        Follow-up in 5 weeks    Avinash Styles MD      Video-Visit Details    Type of service:  Video Visit    Video Start Time: 4:55     Video End Time:5:19    Originating Location (pt. Location): Home    Distant Location (provider location):  [facility]     Platform used for Video Visit: [employer]

## 2020-11-27 NOTE — PATIENT INSTRUCTIONS
My Depression Action Plan  Name: Cindy Xie   Date of Birth 2000  Date: 11/27/2020    My doctor: Marjorie Green   My clinic: [address] [address]  [phone]          GREEN    ZONE   Good Control    What it looks like:     Things are going generally well. You have normal ups and downs. You may even feel depressed from time to time, but bad moods usually last less than a day.   What you need to do:  1. Continue to care for yourself (see self care plan)  2. Check your depression survival kit and update it as needed  3. Follow your physician s recommendations including any medication.  4. Do not stop taking medication unless you consult with your physician first.           YELLOW         ZONE Getting Worse    What it looks like:     Depression is starting to interfere with your life.     It may be hard to get out of bed; you may be starting to isolate yourself from others.    Symptoms of depression are starting to last most all day and this has happened for several days.     You may have suicidal thoughts but they are not constant.   What you need to do:     1. Call your care team. Your response to treatment will improve if you keep your care team informed of your progress. Yellow periods are signs an adjustment may need to be made.     2. Continue your self-care.  Just get dressed and ready for the day.  Don't give yourself time to talk yourself out of it.    3. Talk to someone in your support network.    4. Open up your Depression Self-Care Plan/Wellness Kit.           RED    ZONE Medical Alert - Get Help    What it looks like:     Depression is seriously interfering with your life.     You may experience these or other symptoms: You can t get out of bed most days, can t work or engage in other necessary activities, you have trouble taking care of basic hygiene, or basic responsibilities, thoughts of  suicide or death that will not go away, self-injurious behavior.     What you need to do:  1. Call your care team and request a same-day appointment. If they are not available (weekends or after hours) call your local crisis line, emergency room or 911.            Depression Self-Care Plan / Wellness Kit    Self-Care for Depression  Here s the deal. Your body and mind are really not as separate as most people think.  What you do and think affects how you feel and how you feel influences what you do and think. This means if you do things that people who feel good do, it will help you feel better.  Sometimes this is all it takes.  There is also a place for medication and therapy depending on how severe your depression is, so be sure to consult with your medical provider and/ or Behavioral Health Consultant if your symptoms are worsening or not improving.     In order to better manage my stress, I will:    Exercise  Get some form of exercise, every day. This will help reduce pain and release endorphins, the  feel good  chemicals in your brain. This is almost as good as taking antidepressants!  This is not the same as joining a gym and then never going! (they count on that by the way ) It can be as simple as just going for a walk or doing some gardening, anything that will get you moving.      Hygiene   Maintain good hygiene (get out of bed in the morning, make your bed, brush your teeth, take a shower, and get dressed like you were going to work, even if you are unemployed).  If your clothes don't fit try to get ones that do.    Diet  Strive to eat foods that are good for me, drink plenty of water, and avoid excessive sugar, caffeine, alcohol, and other mood-altering substances.  Some foods that are helpful in depression are: complex carbohydrates, B vitamins, flaxseed, fish or fish oil, fresh fruits and vegetables.    Psychotherapy  Agree to participate in Individual Therapy (if recommended).    Medication  If  prescribed medications, I agree to take them.  Missing doses can result in serious side effects.  I understand that drinking alcohol, or other illicit drug use, may cause potential side effects.  I will not stop my medication abruptly without first discussing it with my provider.    Staying Connected With Others  Stay in touch with my friends, family members, and my primary care provider/team.    Use your imagination  Be creative.  We all have a creative side; it doesn t matter if it s oil painting, sand castles, or mud pies! This will also kick up the endorphins.    Witness Beauty  (AKA stop and smell the roses) Take a look outside, even in mid-winter. Notice colors, textures. Watch the squirrels and birds.     Service to others  Be of service to others.  There is always someone else in need.  By helping others we can  get out of ourselves  and remember the really important things.  This also provides opportunities for practicing all the other parts of the program.    Humor  Laugh and be silly!  Adjust your TV habits for less news and crime-drama and more comedy.    Control your stress  Try breathing deep, massage therapy, biofeedback, and meditation. Find time to relax each day.     Crisis Text Line  http://www.crisistextline.org    The Crisis Text Line serves anyone, in any type of crisis, providing access to free, 24/7 support and information via the medium people already use and trust:    Here's how it works:  1.  Text 492-669 from anywhere in the [place], anytime, about any type of crisis.  2.  A live, trained Crisis Counselor receives the text and responds quickly.  3.  The volunteer Crisis Counselor will help you move from a 'hot moment to a cool moment'.    My support system    Clinic Contact:  Phone number:    Contact 1:  Phone number:    Contact 2:  Phone number:    Buddhist/:  Phone number:    Therapist:  Phone number:    Local crisis center:    Phone number:    Other community support:   Phone number:

## 2020-11-27 NOTE — LETTER
My Depression Action Plan  Name: Cindy Xie   Date of Birth 2000  Date: 11/27/2020    My doctor: Marjorie Green   My clinic: [address] [address]  [phone]          GREEN    ZONE   Good Control    What it looks like:     Things are going generally well. You have normal ups and downs. You may even feel depressed from time to time, but bad moods usually last less than a day.   What you need to do:  1. Continue to care for yourself (see self care plan)  2. Check your depression survival kit and update it as needed  3. Follow your physician s recommendations including any medication.  4. Do not stop taking medication unless you consult with your physician first.           YELLOW         ZONE Getting Worse    What it looks like:     Depression is starting to interfere with your life.     It may be hard to get out of bed; you may be starting to isolate yourself from others.    Symptoms of depression are starting to last most all day and this has happened for several days.     You may have suicidal thoughts but they are not constant.   What you need to do:     1. Call your care team. Your response to treatment will improve if you keep your care team informed of your progress. Yellow periods are signs an adjustment may need to be made.     2. Continue your self-care.  Just get dressed and ready for the day.  Don't give yourself time to talk yourself out of it.    3. Talk to someone in your support network.    4. Open up your Depression Self-Care Plan/Wellness Kit.           RED    ZONE Medical Alert - Get Help    What it looks like:     Depression is seriously interfering with your life.     You may experience these or other symptoms: You can t get out of bed most days, can t work or engage in other necessary activities, you have trouble taking care of basic hygiene, or basic responsibilities, thoughts of  suicide or death that will not go away, self-injurious behavior.     What you need to do:  1. Call your care team and request a same-day appointment. If they are not available (weekends or after hours) call your local crisis line, emergency room or 911.            Depression Self-Care Plan / Wellness Kit    Self-Care for Depression  Here s the deal. Your body and mind are really not as separate as most people think.  What you do and think affects how you feel and how you feel influences what you do and think. This means if you do things that people who feel good do, it will help you feel better.  Sometimes this is all it takes.  There is also a place for medication and therapy depending on how severe your depression is, so be sure to consult with your medical provider and/ or Behavioral Health Consultant if your symptoms are worsening or not improving.     In order to better manage my stress, I will:    Exercise  Get some form of exercise, every day. This will help reduce pain and release endorphins, the  feel good  chemicals in your brain. This is almost as good as taking antidepressants!  This is not the same as joining a gym and then never going! (they count on that by the way ) It can be as simple as just going for a walk or doing some gardening, anything that will get you moving.      Hygiene   Maintain good hygiene (get out of bed in the morning, make your bed, brush your teeth, take a shower, and get dressed like you were going to work, even if you are unemployed).  If your clothes don't fit try to get ones that do.    Diet  Strive to eat foods that are good for me, drink plenty of water, and avoid excessive sugar, caffeine, alcohol, and other mood-altering substances.  Some foods that are helpful in depression are: complex carbohydrates, B vitamins, flaxseed, fish or fish oil, fresh fruits and vegetables.    Psychotherapy  Agree to participate in Individual Therapy (if recommended).    Medication  If  prescribed medications, I agree to take them.  Missing doses can result in serious side effects.  I understand that drinking alcohol, or other illicit drug use, may cause potential side effects.  I will not stop my medication abruptly without first discussing it with my provider.    Staying Connected With Others  Stay in touch with my friends, family members, and my primary care provider/team.    Use your imagination  Be creative.  We all have a creative side; it doesn t matter if it s oil painting, sand castles, or mud pies! This will also kick up the endorphins.    Witness Beauty  (AKA stop and smell the roses) Take a look outside, even in mid-winter. Notice colors, textures. Watch the squirrels and birds.     Service to others  Be of service to others.  There is always someone else in need.  By helping others we can  get out of ourselves  and remember the really important things.  This also provides opportunities for practicing all the other parts of the program.    Humor  Laugh and be silly!  Adjust your TV habits for less news and crime-drama and more comedy.    Control your stress  Try breathing deep, massage therapy, biofeedback, and meditation. Find time to relax each day.     Crisis Text Line  http://www.crisistextline.org    The Crisis Text Line serves anyone, in any type of crisis, providing access to free, 24/7 support and information via the medium people already use and trust:    Here's how it works:  1.  Text 201-026 from anywhere in the [place], anytime, about any type of crisis.  2.  A live, trained Crisis Counselor receives the text and responds quickly.  3.  The volunteer Crisis Counselor will help you move from a 'hot moment to a cool moment'.    My support system    Clinic Contact:  Phone number:    Contact 1:  Phone number:    Contact 2:  Phone number:    Yazdanism/:  Phone number:    Therapist:  Phone number:    Local crisis center:    Phone number:    Other community support:   Phone number:

## 2020-11-28 ASSESSMENT — ANXIETY QUESTIONNAIRES: GAD7 TOTAL SCORE: 10

## 2020-12-13 ENCOUNTER — HEALTH MAINTENANCE LETTER (OUTPATIENT)
Age: 20
End: 2020-12-13

## 2021-01-06 ENCOUNTER — OFFICE VISIT (OUTPATIENT)
Dept: FAMILY MEDICINE | Facility: CLINIC | Age: 21
End: 2021-01-06
Payer: COMMERCIAL

## 2021-01-06 VITALS
OXYGEN SATURATION: 100 % | DIASTOLIC BLOOD PRESSURE: 92 MMHG | SYSTOLIC BLOOD PRESSURE: 135 MMHG | HEART RATE: 91 BPM | WEIGHT: 156 LBS | BODY MASS INDEX: 27.64 KG/M2 | HEIGHT: 63 IN

## 2021-01-06 DIAGNOSIS — N93.9 VAGINAL BLEEDING: ICD-10-CM

## 2021-01-06 DIAGNOSIS — R52 BODY ACHES: ICD-10-CM

## 2021-01-06 DIAGNOSIS — R63.0 DECREASED APPETITE: ICD-10-CM

## 2021-01-06 DIAGNOSIS — R06.02 SOB (SHORTNESS OF BREATH): ICD-10-CM

## 2021-01-06 DIAGNOSIS — R79.89 LOW VITAMIN D LEVEL: ICD-10-CM

## 2021-01-06 DIAGNOSIS — R03.0 ELEVATED BLOOD PRESSURE READING WITHOUT DIAGNOSIS OF HYPERTENSION: ICD-10-CM

## 2021-01-06 DIAGNOSIS — R53.83 OTHER FATIGUE: Primary | ICD-10-CM

## 2021-01-06 LAB
ALBUMIN SERPL-MCNC: 4.3 G/DL (ref 3.4–5)
ALP SERPL-CCNC: 59 U/L (ref 40–150)
ALT SERPL W P-5'-P-CCNC: 44 U/L (ref 0–50)
ANION GAP SERPL CALCULATED.3IONS-SCNC: 7 MMOL/L (ref 3–14)
AST SERPL W P-5'-P-CCNC: 21 U/L (ref 0–45)
BILIRUB SERPL-MCNC: 0.8 MG/DL (ref 0.2–1.3)
BUN SERPL-MCNC: 16 MG/DL (ref 7–30)
CALCIUM SERPL-MCNC: 9.1 MG/DL (ref 8.5–10.1)
CHLORIDE SERPL-SCNC: 103 MMOL/L (ref 94–109)
CO2 SERPL-SCNC: 27 MMOL/L (ref 20–32)
CREAT SERPL-MCNC: 0.82 MG/DL (ref 0.52–1.04)
ERYTHROCYTE [DISTWIDTH] IN BLOOD BY AUTOMATED COUNT: 12 % (ref 10–15)
GFR SERPL CREATININE-BSD FRML MDRD: >90 ML/MIN/{1.73_M2}
GLUCOSE SERPL-MCNC: 83 MG/DL (ref 70–99)
HCT VFR BLD AUTO: 41.6 % (ref 35–47)
HGB BLD-MCNC: 13.8 G/DL (ref 11.7–15.7)
MCH RBC QN AUTO: 29.7 PG (ref 26.5–33)
MCHC RBC AUTO-ENTMCNC: 33.2 G/DL (ref 31.5–36.5)
MCV RBC AUTO: 90 FL (ref 78–100)
PLATELET # BLD AUTO: 257 10E9/L (ref 150–450)
POTASSIUM SERPL-SCNC: 3.8 MMOL/L (ref 3.4–5.3)
PROT SERPL-MCNC: 8.2 G/DL (ref 6.8–8.8)
RBC # BLD AUTO: 4.65 10E12/L (ref 3.8–5.2)
SODIUM SERPL-SCNC: 137 MMOL/L (ref 133–144)
SPECIMEN SOURCE: NORMAL
TSH SERPL DL<=0.005 MIU/L-ACNC: 0.79 MU/L (ref 0.4–4)
WBC # BLD AUTO: 5.4 10E9/L (ref 4–11)
WET PREP SPEC: NORMAL

## 2021-01-06 PROCEDURE — 87210 SMEAR WET MOUNT SALINE/INK: CPT | Performed by: NURSE PRACTITIONER

## 2021-01-06 PROCEDURE — 85027 COMPLETE CBC AUTOMATED: CPT | Performed by: NURSE PRACTITIONER

## 2021-01-06 PROCEDURE — 84443 ASSAY THYROID STIM HORMONE: CPT | Performed by: NURSE PRACTITIONER

## 2021-01-06 PROCEDURE — 80053 COMPREHEN METABOLIC PANEL: CPT | Performed by: NURSE PRACTITIONER

## 2021-01-06 PROCEDURE — 36415 COLL VENOUS BLD VENIPUNCTURE: CPT | Performed by: NURSE PRACTITIONER

## 2021-01-06 PROCEDURE — 82306 VITAMIN D 25 HYDROXY: CPT | Performed by: NURSE PRACTITIONER

## 2021-01-06 PROCEDURE — 99214 OFFICE O/P EST MOD 30 MIN: CPT | Performed by: NURSE PRACTITIONER

## 2021-01-06 ASSESSMENT — PAIN SCALES - GENERAL: PAINLEVEL: NO PAIN (0)

## 2021-01-06 ASSESSMENT — MIFFLIN-ST. JEOR: SCORE: 1436.74

## 2021-01-06 ASSESSMENT — ANXIETY QUESTIONNAIRES
GAD7 TOTAL SCORE: 10
3. WORRYING TOO MUCH ABOUT DIFFERENT THINGS: SEVERAL DAYS
1. FEELING NERVOUS, ANXIOUS, OR ON EDGE: SEVERAL DAYS
IF YOU CHECKED OFF ANY PROBLEMS ON THIS QUESTIONNAIRE, HOW DIFFICULT HAVE THESE PROBLEMS MADE IT FOR YOU TO DO YOUR WORK, TAKE CARE OF THINGS AT HOME, OR GET ALONG WITH OTHER PEOPLE: SOMEWHAT DIFFICULT
6. BECOMING EASILY ANNOYED OR IRRITABLE: SEVERAL DAYS
2. NOT BEING ABLE TO STOP OR CONTROL WORRYING: SEVERAL DAYS
7. FEELING AFRAID AS IF SOMETHING AWFUL MIGHT HAPPEN: NEARLY EVERY DAY
5. BEING SO RESTLESS THAT IT IS HARD TO SIT STILL: SEVERAL DAYS

## 2021-01-06 ASSESSMENT — PATIENT HEALTH QUESTIONNAIRE - PHQ9
5. POOR APPETITE OR OVEREATING: MORE THAN HALF THE DAYS
SUM OF ALL RESPONSES TO PHQ QUESTIONS 1-9: 8

## 2021-01-06 NOTE — PATIENT INSTRUCTIONS
Insomnia:  Melatonin: 1-2 mg at night, then increas by 1-2 mg. 10 max    Breathing: trial inhaler    Irregular menses: get nexplanon out, see if that improves symptoms, use condoms for protection and pregnancy prevention    If you are having decreased appetite, we could consider checking for H.pylori, I will order a stool test you can  at the lab, it is a take home test. It is a bacteria that can sometimes cause nausea/decreased appetite, stomach pains.       At [facility], we strive to deliver an exceptional experience to you, every time we see you. If you receive a survey, please complete it as we do value your feedback.  If you have MyChart, you can expect to receive results automatically within 24 hours of their completion.  Your provider will send a note interpreting your results as well.   If you do not have MyChart, you should receive your results in about a week by mail.    Your care team:                            Family Medicine Internal Medicine   MD Randall Espino MD Shantel Branch-Fleming, MD Srinivasa Vaka, MD Katya Georgiev PA-C  Areli Phipps, APRN CNP    Berry Burns MD Pediatrics   Kike Mojica, PA-C  Gogo Aguilar, CNP MD Xiomy Phan APRN CNP   MD Linda Matta MD Deborah Mielke, MD Kim Thein, APRN CNP  Sharron Young, PA-C  Tiffani Ellison, CNP  MD Hali Nicholson MD Angela Wermerskirchen, MD      Clinic hours: Monday - Thursday 7 am-7 pm; Fridays 7 am-5 pm.   Urgent care: Monday - Friday 11 am-9 pm; Saturday and Sunday 9 am-5 pm.    Clinic: [phone]       [facility]: Monday - Thursday 8 am - 7 pm; Friday 8 am - 6 pm  [facility]: [phone]     Use www.oncare.org for 24/7 diagnosis and treatment of dozens of conditions.

## 2021-01-06 NOTE — RESULT ENCOUNTER NOTE
Cindy,   Your wet prep does not have infection. Hemoglobin and WBC are normal. Other labs are still pending.   Thank you,  LEEANN Kitchen, NP-C  [facility]

## 2021-01-06 NOTE — PROGRESS NOTES
"  Assessment & Plan     Other fatigue  Unknown cause. Wonder if SOB is part if it, trial inhaler, if not improving return for further evaluation. Labs reviewed, normal  - CBC with platelets  - TSH with free T4 reflex  - albuterol (PROAIR HFA/PROVENTIL HFA/VENTOLIN HFA) 108 (90 Base) MCG/ACT inhaler; Inhale 2 puffs into the lungs every 4 hours as needed for shortness of breath / dyspnea    Body aches  Unknown cause, negative for COVID 1.5 weeks ago. Wonder if she was truly positive but tested negative? Monitor. WBC is normal  - Comprehensive metabolic panel (BMP + Alb, Alk Phos, ALT, AST, Total. Bili, TP)  - Vitamin D Deficiency    Vaginal bleeding  Negative, remove nexplanon if irregular bleeding, see if improves  - Wet prep    Decreased appetite  Check for bacteria  - H Pylori antigen stool; Future    SOB (shortness of breath)  As above  - albuterol (PROAIR HFA/PROVENTIL HFA/VENTOLIN HFA) 108 (90 Base) MCG/ACT inhaler; Inhale 2 puffs into the lungs every 4 hours as needed for shortness of breath / dyspnea    Elevated blood pressure  Remove nexplanon, see if improves, use condoms. Return in 2 months after removal to see if improved, if not may need to consider  Treatment?    Review of the result(s) of each unique test - as above       BMI:   Estimated body mass index is 27.63 kg/m  as calculated from the following:    Height as of this encounter: 1.6 m (5' 3\").    Weight as of this encounter: 70.8 kg (156 lb).           Return in about 2 months (around 3/6/2021) for BP Recheck.    LEEANN Kitchen, NP-C  [facility]    Subjective     Cindy Xie is a 20 year old who presents to clinic today for the following health issues  accompanied by her self:    HPI       Acute Illness  Acute illness concerns: SOB, no asthma in family.   Onset/Duration: about 1 month  Symptoms:  Fever: no  Chills/Sweats: YES- chills  Headache (location?): YES  Sinus Pressure: no  Conjunctivitis:  no  Ear Pain: " no  Rhinorrhea: no  Congestion: no  Sore Throat: no  Cough: no  Wheeze: no  Decreased Appetite: YES  Nausea: no  Vomiting: no  Diarrhea: no  Dysuria/Freq.: no  Dysuria or Hematuria: no  Fatigue/Achiness: YES  Sick/Strep Exposure: no  Therapies tried and outcome: None    COVID test Dec 31st, negative. Hasn't been exposed that she is aware of.   Has not gotten flu shot.   Is normally around 155lb        Insomnia  Onset/Duration: about 1 month  Description:   Frequency of insomnia:  nightly  Time to fall asleep (sleep latency): 1 hour  Middle of night awakening:  YES  Early morning awakening:  YES  Progression of Symptoms:  same  Accompanying Signs & Symptoms:  Daytime sleepiness/napping: YES  Excessive snoring/apnea: no  Restless legs: no  Waking to urinate: no  Chronic pain:  no  Depression symptoms (if yes, do PHQ9): YES  Anxiety symptoms (if yes, do TRAVIS-7): YES  History:  Prior Insomnia: no  New stressful situation: YES- death, aunt passed away  Precipitating factors:   Caffeine intake: YES  OTC decongestants: no  Any new medications: YES  Alleviating factors:  Self medicating (alcohol, etc.):  Yes otc sleep aid, similar to nyquil, takes it some nights.   Stress-reduction (exercise, yoga, meditation etc): YES- exercise  Therapies tried and outcome: none    Feels sertraline is helping. Melatonin: 1-2 mg at night, then increas by 1-2 mg. 10 max    Menstrual Concern  Onset/Duration: about 6 months  Description:   Duration of bleeding episodes: 7 days  Frequency of periods: (1st day of one to 1st day of next):  every 28 days  Describe bleeding/flow:   Clots:  no   Number of pads/day: 3        Cramping: severe and during  Accompanying Signs & Symptoms:  Lightheadedness: no  Temperature intolerance: no  Nosebleeds/Easy bruising: no  Vaginal Discharge: YES  Acne: YES  Change in body hair: no  History:  Patient's last menstrual period was 12/28/2020.  Previous normal periods: no   Contraceptive use: Nexplanon  Sexually  "active: YES  Any bleeding after intercourse: YES  Abnormal PAP Smears: no  Precipitating or alleviating factors: None  Therapies tried and outcome: None    Feels period comes more than it should with nexlplanon. Just recently after intercourse she bleeds. That is new. No STD concerns.     Ate this morning around 9-10 am    High blood pressure today: no family history      Review of Systems   Constitutional-as above, HEENT, cardiovascular-as above, pulmonary, gi and gu-as above, systems are negative, except as otherwise noted.      Objective    BP (!) 135/92   Pulse 91   Ht 1.6 m (5' 3\")   Wt 70.8 kg (156 lb)   LMP 12/28/2020   SpO2 100%   BMI 27.63 kg/m    Body mass index is 27.63 kg/m .  Physical Exam   GENERAL: healthy, alert and no acute distress  EYES: Eyes grossly normal to inspection, PERRL and conjunctivae and sclerae normal  HENT: ear canals and TM's normal, nose and mouth without ulcers or lesions  NECK: no adenopathy, no asymmetry, masses, or scars and thyroid normal to palpation  RESP: lungs clear to auscultation - no rales, rhonchi or wheezes  CV: regular rate and rhythm, normal S1 S2, no S3 or S4, no murmur, click or rub  ABDOMEN: soft, nontender, no hepatosplenomegaly, no masses and bowel sounds normal  MS: no gross musculoskeletal defects noted, no edema    Results for orders placed or performed in visit on 01/06/21 (from the past 24 hour(s))   Wet prep    Specimen: Vagina   Result Value Ref Range    Specimen Description Vagina     Wet Prep No Trichomonas seen     Wet Prep No clue cells seen     Wet Prep No yeast seen     Wet Prep Rare  WBC'S seen      CBC with platelets   Result Value Ref Range    WBC 5.4 4.0 - 11.0 10e9/L    RBC Count 4.65 3.8 - 5.2 10e12/L    Hemoglobin 13.8 11.7 - 15.7 g/dL    Hematocrit 41.6 35.0 - 47.0 %    MCV 90 78 - 100 fl    MCH 29.7 26.5 - 33.0 pg    MCHC 33.2 31.5 - 36.5 g/dL    RDW 12.0 10.0 - 15.0 %    Platelet Count 257 150 - 450 10e9/L   Comprehensive metabolic " panel (BMP + Alb, Alk Phos, ALT, AST, Total. Bili, TP)   Result Value Ref Range    Sodium 137 133 - 144 mmol/L    Potassium 3.8 3.4 - 5.3 mmol/L    Chloride 103 94 - 109 mmol/L    Carbon Dioxide 27 20 - 32 mmol/L    Anion Gap 7 3 - 14 mmol/L    Glucose 83 70 - 99 mg/dL    Urea Nitrogen 16 7 - 30 mg/dL    Creatinine 0.82 0.52 - 1.04 mg/dL    GFR Estimate >90 >60 mL/min/[1.73_m2]    GFR Estimate If Black >90 >60 mL/min/[1.73_m2]    Calcium 9.1 8.5 - 10.1 mg/dL    Bilirubin Total 0.8 0.2 - 1.3 mg/dL    Albumin 4.3 3.4 - 5.0 g/dL    Protein Total 8.2 6.8 - 8.8 g/dL    Alkaline Phosphatase 59 40 - 150 U/L    ALT 44 0 - 50 U/L    AST 21 0 - 45 U/L   TSH with free T4 reflex   Result Value Ref Range    TSH 0.79 0.40 - 4.00 mU/L

## 2021-01-07 LAB — DEPRECATED CALCIDIOL+CALCIFEROL SERPL-MC: 19 UG/L (ref 20–75)

## 2021-01-07 RX ORDER — ERGOCALCIFEROL 1.25 MG/1
50000 CAPSULE, LIQUID FILLED ORAL WEEKLY
Qty: 8 CAPSULE | Refills: 0 | Status: SHIPPED | OUTPATIENT
Start: 2021-01-07 | End: 2021-02-26

## 2021-01-07 RX ORDER — ALBUTEROL SULFATE 90 UG/1
2 AEROSOL, METERED RESPIRATORY (INHALATION) EVERY 4 HOURS PRN
Qty: 1 INHALER | Refills: 0 | Status: SHIPPED | OUTPATIENT
Start: 2021-01-07 | End: 2021-12-08

## 2021-01-07 ASSESSMENT — ANXIETY QUESTIONNAIRES: GAD7 TOTAL SCORE: 10

## 2021-01-07 NOTE — RESULT ENCOUNTER NOTE
Michael White,   Your Vit D is low, I sent high dose vit D to take once a week for 8 weeks. Sometimes when vit d is low it can contribute to fatigue. Please let me know if you have questions.   thank you,  LEEANN Kitchen, NP-C  [facility]

## 2021-01-07 NOTE — RESULT ENCOUNTER NOTE
Hi Cindy,   your labs are coming back normal, not a cause for your irregular menses or fatigue/shortness of breath. We can trial an inhaler though to see if that helps the breathing.  Do you feel your breathing issue is related to anxiety? Also, recommend getting your nexplanon taken out to see if that is the cause of your in ability to lose weight, irregular menses, and high blood pressure. Would recommend using condoms for birth control and no hormones to see if your body regulates on its own. Follow up with me or a provider 2 months after nexplanon removal to see if improved blood pressure and other symptoms. Please let me know if you have questions   Thank you,  LEEANN Kitchen, NP-C  [facility]

## 2021-01-18 ENCOUNTER — OFFICE VISIT (OUTPATIENT)
Dept: FAMILY MEDICINE | Facility: CLINIC | Age: 21
End: 2021-01-18
Payer: COMMERCIAL

## 2021-01-18 VITALS
DIASTOLIC BLOOD PRESSURE: 87 MMHG | OXYGEN SATURATION: 100 % | HEART RATE: 82 BPM | BODY MASS INDEX: 27.29 KG/M2 | WEIGHT: 154 LBS | SYSTOLIC BLOOD PRESSURE: 134 MMHG | HEIGHT: 63 IN

## 2021-01-18 DIAGNOSIS — Z97.5 BREAKTHROUGH BLEEDING ON NEXPLANON: Primary | ICD-10-CM

## 2021-01-18 DIAGNOSIS — Z23 NEED FOR PROPHYLACTIC VACCINATION AND INOCULATION AGAINST INFLUENZA: ICD-10-CM

## 2021-01-18 DIAGNOSIS — Z11.3 SCREENING FOR STDS (SEXUALLY TRANSMITTED DISEASES): ICD-10-CM

## 2021-01-18 DIAGNOSIS — R03.0 ELEVATED BP WITHOUT DIAGNOSIS OF HYPERTENSION: ICD-10-CM

## 2021-01-18 DIAGNOSIS — N92.1 BREAKTHROUGH BLEEDING ON NEXPLANON: Primary | ICD-10-CM

## 2021-01-18 DIAGNOSIS — F32.1 MAJOR DEPRESSIVE DISORDER, SINGLE EPISODE, MODERATE (H): ICD-10-CM

## 2021-01-18 DIAGNOSIS — Z11.59 NEED FOR HEPATITIS C SCREENING TEST: ICD-10-CM

## 2021-01-18 PROCEDURE — 90686 IIV4 VACC NO PRSV 0.5 ML IM: CPT | Performed by: NURSE PRACTITIONER

## 2021-01-18 PROCEDURE — 90471 IMMUNIZATION ADMIN: CPT | Performed by: NURSE PRACTITIONER

## 2021-01-18 PROCEDURE — 99214 OFFICE O/P EST MOD 30 MIN: CPT | Mod: 25 | Performed by: NURSE PRACTITIONER

## 2021-01-18 ASSESSMENT — MIFFLIN-ST. JEOR: SCORE: 1427.67

## 2021-01-18 ASSESSMENT — PAIN SCALES - GENERAL: PAINLEVEL: NO PAIN (0)

## 2021-01-18 NOTE — PROGRESS NOTES
Assessment & Plan     Breakthrough bleeding on Nexplanon  Will have her use Ibuprofen 800 mg every 87 hours for 5 days to see if this helps with bleeding pattern.  If no improvement, can try doxycycline and if still bleeding, would recommend stopping Nexplanon.  Always use condoms to help prevnet STI's.    Major depressive disorder, single episode, moderate (H)  Stable on Zoloft, continue current management    Elevated BP without diagnosis of hypertension  Recheck BP twice weekly and if BP remains elevated, would screen for secondary causes of HYPERTENSION, get renal US.    Screening for STDs (sexually transmitted diseases)  GC screening done    Need for hepatitis C screening test    - Hepatitis C Screen Reflex to HCV RNA Quant and Genotype  - Home Blood Pressure Monitor Order for DME - ONLY FOR DME    Need for prophylactic vaccination and inoculation against influenza    - INFLUENZA VACCINE IM > 6 MONTHS VALENT IIV4 [82442]      31 minutes spent on the date of the encounter doing chart review, history and exam, documentation and further activities as noted above           See Patient Instructions    Return in about 4 weeks (around 2/15/2021), or if symptoms worsen or fail to improve, for Follow up.    LEEANN Gutierrez [facility] MARIZA White is a 20 year old who presents to clinic today for the following health issues \    HPI     INFORMATION ON REMOVAL OF NEXPLANON    Depression Followup    How are you doing with your depression since your last visit? No change    Are you having other symptoms that might be associated with depression? No    Have you had a significant life event?  No     Are you feeling anxious or having panic attacks?   No    Do you have any concerns with your use of alcohol or other drugs? No    Social History     Tobacco Use     Smoking status: Never Smoker     Smokeless tobacco: Never Used   Substance Use Topics     Alcohol use: Yes      Comment: occasional     Drug use: No     PHQ 9/30/2020 11/27/2020 1/6/2021   PHQ-9 Total Score 10 11 8   Q9: Thoughts of better off dead/self-harm past 2 weeks Not at all Several days Not at all     TRAVIS-7 SCORE 9/30/2020 11/27/2020 1/6/2021   Total Score 5 10 10     Last PHQ-9 1/6/2021   1.  Little interest or pleasure in doing things 0   2.  Feeling down, depressed, or hopeless 0   3.  Trouble falling or staying asleep, or sleeping too much 3   4.  Feeling tired or having little energy 2   5.  Poor appetite or overeating 2   6.  Feeling bad about yourself 1   7.  Trouble concentrating 0   8.  Moving slowly or restless 0   Q9: Thoughts of better off dead/self-harm past 2 weeks 0   PHQ-9 Total Score 8   Difficulty at work, home, or with people Somewhat difficult     TRAVIS-7  1/6/2021   1. Feeling nervous, anxious, or on edge 1   2. Not being able to stop or control worrying 1   3. Worrying too much about different things 1   4. Trouble relaxing 2   5. Being so restless that it is hard to sit still 1   6. Becoming easily annoyed or irritable 1   7. Feeling afraid, as if something awful might happen 3   TRAVIS-7 Total Score 10   If you checked any problems, how difficult have they made it for you to do your work, take care of things at home, or get along with other people? Somewhat difficult     In the past two weeks have you had thoughts of suicide or self-harm?  No.    Do you have concerns about your personal safety or the safety of others?   No    Suicide Assessment Five-step Evaluation and Treatment (SAFE-T)    She has been having BTB on Nexplanon, is bleeding for up to 2 weeks at a time and is tired of bleeding.  Nexplanon placed 12/17/19.  No change in partner, no recent STI but had chlamydia in October-requests GC screen today. No discharge, odor, pain with intercourse, no abdominal, flank or back pain    Review of Systems   Constitutional, HEENT, cardiovascular, pulmonary, gi and gu systems are negative, except as  "otherwise noted.      Objective    BP (!) 146/95 (BP Location: Left arm, Patient Position: Chair, Cuff Size: Adult Large)   Pulse 82   Ht 1.6 m (5' 3\")   Wt 69.9 kg (154 lb)   LMP 01/18/2021   SpO2 100%   BMI 27.28 kg/m    Body mass index is 27.28 kg/m .  Physical Exam   GENERAL: healthy, alert and no distress  EYES: Eyes grossly normal to inspection, PERRL and conjunctivae and sclerae normal  HENT: ear canals and TM's normal, nose and mouth without ulcers or lesions  NECK: no adenopathy, no asymmetry, masses, or scars and thyroid normal to palpation  RESP: lungs clear to auscultation - no rales, rhonchi or wheezes  CV: regular rate and rhythm, normal S1 S2, no S3 or S4, no murmur, click or rub, no peripheral edema and peripheral pulses strong  ABDOMEN: soft, nontender, no hepatosplenomegaly, no masses and bowel sounds normal  MS: no gross musculoskeletal defects noted, no edema  SKIN: no suspicious lesions or rashes  NEURO: Normal strength and tone, mentation intact and speech normal  BACK: no CVA tenderness, no paralumbar tenderness  PSYCH: mentation appears normal, affect normal/bright  LYMPH: normal ant/post cervical, supraclavicular nodes      I spent a total of 29 minutes face-to-face with Cindy Xie during today's office visit.  Over 50% of this time was spent counseling the patient and/or coordinating care regarding BRB on Nexplanon and management options, depression, STI screening  .  See note for details.        "

## 2021-01-18 NOTE — PATIENT INSTRUCTIONS
At [facility], we strive to deliver an exceptional experience to you, every time we see you. If you receive a survey, please complete it as we do value your feedback.  If you have MyChart, you can expect to receive results automatically within 24 hours of their completion.  Your provider will send a note interpreting your results as well.   If you do not have MyChart, you should receive your results in about a week by mail.    Your care team:                            Family Medicine Internal Medicine   MD Randall Espino, MD Sudarshan Sin MD Katya Georgiev PA-C Megan Hill, APRN CNP    Berry Burns, MD Pediatrics   Kike Mojica, PAFernandoC  Gogo Aguilar, CNP MD Xiomy Phan APRN CNP   MD Linda Matta MD Deborah Mielke, MD Kristi Armando, APRN CNP  Sharron Young, KENISHA Ellison, CNP  MD Hali Nicholson MD Angela Wermerskirchen, MD      Clinic hours: Monday - Thursday 7 am-7 pm; Fridays 7 am-5 pm.   Urgent care: Monday - Friday 11 am-9 pm; Saturday and Sunday 9 am-5 pm.    Clinic: [phone]       [facility]: Monday - Thursday 8 am - 7 pm; Friday 8 am - 6 pm  [facility]: [phone]     Use www.[email or web address].org for 24/7 diagnosis and treatment of dozens of conditions.    We discussed that it is common for there to be irregular and sometimes heavy bleeding after placement of nexplanon.  This may or may not improve with time.     There are a few options to help stop the bleeding after the nexplanon insertion.     1.  Motrin 800 mg three times per day for 5 days (this is available over the counter)  2.  Doxycycline twice per day for 10 days.  This is an antibiotic, but it has anti-inflammatory properties that help calm down the bleeding.    3.  Hormone replacement by either a low dose birth control pill for 6 weeks or the  nuvaring for 6 weeks.        If the above measures do not improve her symptoms, she may f/u for nexplanon removal.       Patient Education     High Blood Pressure, To Be Confirmed, No Treatment   Your blood pressure today was higher than normal. Sometimes anxiety, pain, or other issues can cause a short-term rise in blood pressure. It later returns to normal. Blood pressure that is high only one time doesn t mean that you have high blood pressure (hypertension). High blood pressure is a long-term (chronic) illness. But you should have your blood pressure measured again in the next few days to find out if it s still high.     Blood pressure measurements are given as 2 numbers. Systolic blood pressure is the upper number. This is the pressure when the heart contracts. Diastolic blood pressure is the lower number. This is the pressure when the heart relaxes between beats. You will see your blood pressure readings written together. For example, a person with a systolic pressure of 118 and a diastolic pressure of 78 will have 118/78 written in the medical record.   Blood pressure is classified as normal, raised (elevated), or stage 1 or stage 2 high blood pressure:     Normal blood pressure. Systolic of less than 120 and diastolic of less than 80 (120/80).    Elevated blood pressure. Systolic of 120 to 129 and diastolic less than 80.    Stage 1 high blood pressure. Systolic is 130 to 139 or diastolic between 80 to 89.    Stage 2 high blood pressure. Systolic is 140 or higher or the diastolic is 90 or higher.  Lifestyle changes can help manage your blood pressure. These include weight loss, exercise, and quitting smoking. Have your blood pressure checked regularly to be sure it is under control.   Home care  To track your blood pressure, your healthcare provider may ask you to come into the office at different times and on different days. If your provider asks you to check your readings at home, ask him or her what times  of the day to test and for how many days. Before you leave the office, ask your provider to show you how to take your blood pressure. Ask questions if you don't understand something.   Using a home blood pressure monitor  Think about buying an automatic blood pressure monitor. Ask your provider for a recommendation as well as the correct size cuff to fit your arm. You can buy blood pressure monitors at most pharmacies.   The American Heart Association advises the following guidelines for home blood pressure monitoring:     Don't smoke or drink coffee or other caffeinated drinks for 30 minutes before taking your blood pressure.    Go to the bathroom before the test.    Relax for 5 minutes before taking the measurement.    Sit with your back supported (don't sit on a couch or soft chair). Keep your feet on the floor uncrossed. Place your arm on a solid flat surface (like a table) with the upper part of the arm at heart level. Place the middle of the cuff directly above the bend of the elbow. Check the monitor's instruction manual for an illustration.    Take multiple readings. When you measure, take 2 to 3 readings one minute apart. Record all of the results.    Take your blood pressure at the same time every day, or as your provider advises.    Record the date, time, and blood pressure reading.    Take the record with you to your next medical appointment. If your blood pressure monitor has a built-in memory, simply take the monitor with you to your next appointment.    Call your provider if you have several high readings. Don't be frightened by a single high blood pressure reading. But if you get a few high readings, check in with your provider.  Follow-up care  Keep all of your follow-up appointments. If your blood pressure is more than 120 over 80 on 2 out of 3 days, you will need to follow up with your healthcare provider for more evaluation and treatment.   Don t put this off! High blood pressure can be treated.  High blood pressure that s not treated raises your risk for heart attack, heart failure, kidney disease, and stroke.   Call 911  Call 911 if you have any of these:     Blood pressure of 180/120 or higher    Chest pain or shortness of breath    Weakness of an arm or leg or one side of the face    Problems speaking or seeing  When to get medical advice  Call your healthcare provider right away if any of these occur:     Severe headache    Throbbing or rushing sound in the ears    Nosebleed    Sudden severe pain in your belly (abdomen)    Extreme drowsiness, confusion, or fainting    Dizziness or dizziness with spinning feeling (vertigo)  Olive last reviewed this educational content on 7/1/2019 2000-2020 [employer]. [address] [place] [address], [place] [address]. All rights reserved. This information is not intended as a substitute for professional medical care. Always follow your healthcare professional's instructions.

## 2021-01-20 DIAGNOSIS — R06.02 SOB (SHORTNESS OF BREATH): ICD-10-CM

## 2021-01-20 DIAGNOSIS — R53.83 OTHER FATIGUE: ICD-10-CM

## 2021-01-20 RX ORDER — ALBUTEROL SULFATE 90 UG/1
2 AEROSOL, METERED RESPIRATORY (INHALATION) EVERY 4 HOURS PRN
Qty: 1 INHALER | Refills: 0 | Status: CANCELLED | OUTPATIENT
Start: 2021-01-20

## 2021-01-21 NOTE — TELEPHONE ENCOUNTER
Called the pharmacy and with the sig line as written the one inhaler is only a 16 day supply.    Routing refill request to provider for review/approval because:  Sent to provider to address if additional refills is appropriate.    Lynda Beard RN, [facility] Triage

## 2021-01-21 NOTE — TELEPHONE ENCOUNTER
Inhaler was prescribed for possible shortness of breath to see if related to fatigue.  Please find out if patient finds this inhaler helpful or not.  Can then adjust order as needed.  Thank you,  LEEANN Kitchen, NP-C  [facility]

## 2021-01-27 DIAGNOSIS — F41.9 ANXIETY: ICD-10-CM

## 2021-01-27 DIAGNOSIS — F32.1 MAJOR DEPRESSIVE DISORDER, SINGLE EPISODE, MODERATE (H): ICD-10-CM

## 2021-01-27 NOTE — TELEPHONE ENCOUNTER
This writer attempted to contact patient on 01/27/21      Reason for call check if inhaler is working and left message.      If patient calls back:   [facility] Team (MA/TC) called. Inform patient that someone from the team will contact them, document that pt called and route to care team.         Juliette Crow MA

## 2021-01-28 NOTE — TELEPHONE ENCOUNTER
"Requested Prescriptions   Pending Prescriptions Disp Refills     sertraline (ZOLOFT) 100 MG tablet [Pharmacy Med Name: SERTRALINE 100MG TABLETS] 30 tablet      Sig: TAKE 1 TABLET BY MOUTH DAILY FOR DEPRESSION OR ANXIETY PREVENTION       SSRIs Protocol Failed - 1/27/2021  3:49 AM        Failed - PHQ-9 score less than 5 in past 6 months     Please review last PHQ-9 score.           Passed - Medication is active on med list        Passed - Patient is age 18 or older        Passed - No active pregnancy on record        Passed - No positive pregnancy test in last 12 months        Passed - Recent (6 mo) or future (30 days) visit within the authorizing provider's specialty     Patient had office visit in the last 6 months or has a visit in the next 30 days with authorizing provider or within the authorizing provider's specialty.  See \"Patient Info\" tab in inbasket, or \"Choose Columns\" in Meds & Orders section of the refill encounter.                 "

## 2021-02-01 ENCOUNTER — VIRTUAL VISIT (OUTPATIENT)
Dept: FAMILY MEDICINE | Facility: CLINIC | Age: 21
End: 2021-02-01
Payer: COMMERCIAL

## 2021-02-01 DIAGNOSIS — F41.9 ANXIETY: ICD-10-CM

## 2021-02-01 DIAGNOSIS — F32.1 MAJOR DEPRESSIVE DISORDER, SINGLE EPISODE, MODERATE (H): ICD-10-CM

## 2021-02-01 PROCEDURE — 99213 OFFICE O/P EST LOW 20 MIN: CPT | Mod: 95 | Performed by: PHYSICIAN ASSISTANT

## 2021-02-01 RX ORDER — SERTRALINE HYDROCHLORIDE 100 MG/1
100 TABLET, FILM COATED ORAL DAILY
Qty: 90 TABLET | Refills: 1 | Status: SHIPPED | OUTPATIENT
Start: 2021-02-01 | End: 2021-07-27

## 2021-02-01 ASSESSMENT — ANXIETY QUESTIONNAIRES
3. WORRYING TOO MUCH ABOUT DIFFERENT THINGS: SEVERAL DAYS
2. NOT BEING ABLE TO STOP OR CONTROL WORRYING: SEVERAL DAYS
6. BECOMING EASILY ANNOYED OR IRRITABLE: NOT AT ALL
IF YOU CHECKED OFF ANY PROBLEMS ON THIS QUESTIONNAIRE, HOW DIFFICULT HAVE THESE PROBLEMS MADE IT FOR YOU TO DO YOUR WORK, TAKE CARE OF THINGS AT HOME, OR GET ALONG WITH OTHER PEOPLE: NOT DIFFICULT AT ALL
7. FEELING AFRAID AS IF SOMETHING AWFUL MIGHT HAPPEN: NOT AT ALL
1. FEELING NERVOUS, ANXIOUS, OR ON EDGE: SEVERAL DAYS
5. BEING SO RESTLESS THAT IT IS HARD TO SIT STILL: NOT AT ALL
GAD7 TOTAL SCORE: 3

## 2021-02-01 ASSESSMENT — PATIENT HEALTH QUESTIONNAIRE - PHQ9
5. POOR APPETITE OR OVEREATING: NOT AT ALL
SUM OF ALL RESPONSES TO PHQ QUESTIONS 1-9: 3

## 2021-02-01 NOTE — PROGRESS NOTES
Cindy is a 20 year old who is being evaluated via a billable video visit.      How would you like to obtain your AVS? MyChart  If the video visit is dropped, the invitation should be resent by: cell  Will anyone else be joining your video visit? 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         15 minutes spent on the date of the encounter doing patient visit and documentation            No follow-ups on file.    Janis Plummer PA-C  [facility]    Elton White is a 20 year old who presents to clinic today for the following health issues     HPI       Depression and Anxiety Follow-Up    How are you doing with your depression since your last visit? Improved     How are you doing with your anxiety since your last visit?  Improved     Are you having other symptoms that might be associated with depression or anxiety? Yes:  mild symptoms, see PHQ-9 and TRAVIS-7    Have you had a significant life event? No     Do you have any concerns with your use of alcohol or other drugs? No    Social History     Tobacco Use     Smoking status: Never Smoker     Smokeless tobacco: Never Used   Substance Use Topics     Alcohol use: Yes     Comment: occasional     Drug use: No     PHQ 11/27/2020 1/6/2021 2/1/2021   PHQ-9 Total Score 11 8 3   Q9: Thoughts of better off dead/self-harm  past 2 weeks Several days Not at all Not at all     TRAVIS-7 SCORE 11/27/2020 1/6/2021 2/1/2021   Total Score 10 10 3     Last PHQ-9 2/1/2021   1.  Little interest or pleasure in doing things 0   2.  Feeling down, depressed, or hopeless 0   3.  Trouble falling or staying asleep, or sleeping too much 1   4.  Feeling tired or having little energy 1   5.  Poor appetite or overeating 1   6.  Feeling bad about yourself 0   7.  Trouble concentrating 0   8.  Moving slowly or restless 0   Q9: Thoughts of better off dead/self-harm past 2 weeks 0   PHQ-9 Total Score 3   Difficulty at work, home, or with people Not difficult at all     TRAVIS-7  2/1/2021   1. Feeling nervous, anxious, or on edge 1   2. Not being able to stop or control worrying 1   3. Worrying too much about different things 1   4. Trouble relaxing 0   5. Being so restless that it is hard to sit still 0   6. Becoming easily annoyed or irritable 0   7. Feeling afraid, as if something awful might happen 0   TRAVIS-7 Total Score 3   If you checked any problems, how difficult have they made it for you to do your work, take care of things at home, or get along with other people? Not difficult at all       Patient also has concerns that her anxiety and depression diagnoses may affect her ability to get licence for a gun. 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  Patient currently is doing well with her mental health but she was hospitalized for a suicide attempt when she was 16 year old.       Suicide Assessment Five-step Evaluation and Treatment (SAFE-T)        Review of Systems   Constitutional, HEENT, cardiovascular, pulmonary, gi and gu systems are negative, except as otherwise noted.      Objective           Vitals:  No vitals were obtained today due to virtual visit.    Physical Exam   GENERAL: Healthy, alert and no distress  EYES: Eyes grossly normal to inspection.  No discharge or erythema, or obvious scleral/conjunctival  abnormalities.  RESP: No audible wheeze, cough, or visible cyanosis.  No visible retractions or increased work of breathing.    SKIN: Visible skin clear. No significant rash, abnormal pigmentation or lesions.  NEURO: Cranial nerves grossly intact.  Mentation and speech appropriate for age.  PSYCH: Mentation appears normal, affect normal/bright, judgement and insight intact, normal speech and appearance well-groomed.                Video-Visit Details    Type of service:  Video Visit    Video End Time:2:03 PM    Originating Location (pt. Location): Home    Distant Location (provider location):  [facility]     Platform used for Video Visit: [employer]

## 2021-02-02 ENCOUNTER — VIRTUAL VISIT (OUTPATIENT)
Dept: FAMILY MEDICINE | Facility: CLINIC | Age: 21
End: 2021-02-02
Payer: COMMERCIAL

## 2021-02-02 DIAGNOSIS — L90.5 ACNE SCARRING: Primary | ICD-10-CM

## 2021-02-02 PROCEDURE — 99213 OFFICE O/P EST LOW 20 MIN: CPT | Mod: 95 | Performed by: INTERNAL MEDICINE

## 2021-02-02 RX ORDER — ADAPALENE 0.1 G/100G
CREAM TOPICAL AT BEDTIME
Qty: 45 G | Refills: 11 | Status: SHIPPED | OUTPATIENT
Start: 2021-02-02 | End: 2021-05-18

## 2021-02-02 ASSESSMENT — ANXIETY QUESTIONNAIRES: GAD7 TOTAL SCORE: 3

## 2021-02-02 NOTE — PROGRESS NOTES
Cindy is a 20 year old who is being evaluated via a billable video visit.      How would you like to obtain your AVS? MyChart  If the video visit is dropped, the invitation should be resent by: Text to cell phone: [phone]  Will anyone else be joining your video visit? No    [facility] Internal Medicine Progress Note           Assessment and Plan:   1. Acne scarring  Treat underlying acne. Retinoic acid is ideal drug, but not prescribed due to potential side effects. In my opinion, progesterone-only contraceptive methods is inciting factor.  - adapalene (DIFFERIN) 0.1 % external cream; Apply topically At Bedtime  Dispense: 45 g; Refill: 11           Interval History:     Cindy is a 20 year old who presents to clinic today for the following health issues     Reason for visit: skin issues  Onset: subacute  Description: Acne on face, forehead and chest  Course: worse  Intensity: mild  Associated symptoms: None  History: Yes  Evaluation: None  Precipitating factor: Unknown. Patient states acne started before Nexplanon implantation.  Alleviating factor: None  Complications: scarring.  Therapies tried and outcome: Cetaphil non-oily moisturizing cream containing   Salicyclic acid, sensitive toner with aloe vera and Charcoal black sugar masque (all are not effective).              Significant Problems:   Patient Active Problem List   Diagnosis     Health Care Home     Family history of liver failure     Nexplanon in place     Major depressive disorder, single episode, moderate (H)              Review of Systems:   CONSTITUTIONAL: NEGATIVE for fever, chills, change in weight  INTEGUMENTARY/SKIN: As above.  EYES: NEGATIVE for vision changes or irritation  ENT/MOUTH: NEGATIVE for ear, mouth and throat problems  RESP: NEGATIVE for significant cough or SOB  BREAST: NEGATIVE for masses, tenderness or discharge  CV: NEGATIVE for chest pain, palpitations or peripheral edema  GI: NEGATIVE for nausea, abdominal pain,  heartburn, or change in bowel habits  : NEGATIVE for frequency, dysuria, or hematuria  MUSCULOSKELETAL: NEGATIVE for significant arthralgias or myalgia  NEURO: NEGATIVE for weakness, dizziness or paresthesias  ENDOCRINE: NEGATIVE for temperature intolerance, skin/hair changes. Nexplanon implanted two years ago.  HEME: NEGATIVE for bleeding problems  PSYCHIATRIC: NEGATIVE for changes in mood or affect             Physical Exam:   Vital signs not obtained due to nature of visit.    Constitutional: Awake, alert, cooperative, no apparent distress, and appears stated age.  Eyes: extra-ocular muscles intact and sclera clear  ENT: normocepalic, without obvious abnormality  Lungs: no increased work of breathing and no retractions  Neurologic: Mental Status Exam:  Level of Alertness:   awake  Neuropsychiatric: Affect: normal  Orientation: oriented to self, place, time and situation  Skin: Postinflammatory hyperpigmentation and scarring noted on left cheek, forehead and anterior chest.          Data:     Ref Range & Units 3wk ago      TSH 0.40 - 4.00 mU/L 0.79    Resulting Agency  [facility]         Specimen Collected: 01/06/21  3:20 PM Last Resulted: 01/06/21  6:45 PM             Disposition: Follow-up in 4 weeks or as needed.    Radnall Bullard MD  Internal Medicine  [facility] Team       Video-Visit Details     Type of service:  Video Visit     Video Start Time: 4:45 PM  Video End Time: 4:56 PM    Originating Location (pt. Location): Home     Distant Location (provider location):  [facility]      Platform used for Video Visit: [employer]

## 2021-02-10 RX ORDER — SERTRALINE HYDROCHLORIDE 100 MG/1
TABLET, FILM COATED ORAL
Qty: 30 TABLET | OUTPATIENT
Start: 2021-02-10

## 2021-02-28 ENCOUNTER — TELEPHONE (OUTPATIENT)
Dept: FAMILY MEDICINE | Facility: CLINIC | Age: 21
End: 2021-02-28

## 2021-02-28 NOTE — TELEPHONE ENCOUNTER
Pharmacy is requesting refill for this medication    vitamin D2 (ERGOCALCIFEROL) 21149 units (1250 mcg) capsule 8 capsule 0 1/7/2021 2/26/2021           Jarrod Faarax  [facility]

## 2021-03-01 NOTE — TELEPHONE ENCOUNTER
Patient was to only take medication x 8 weeks.  Prescribed on 1/7/21.  Will clarify with provider if prescription to continue or not. No follow up plan noted in last result note (unclear if should be on OTC dose now or if needs lab recheck, etc).  See pharmacy request below.    Tonya Brian RN  [facility]

## 2021-03-01 NOTE — TELEPHONE ENCOUNTER
I would recommend a decrease to 2000 international unit(s) over the counter now.  Notify patient please.    PSK

## 2021-03-01 NOTE — TELEPHONE ENCOUNTER
This writer attempted to contact Cindy on 03/01/21      Reason for call provider's message and left message.    If patient calls back:   Transfer to Nurse at [facility]     Olga Duval RN  [facility]

## 2021-03-02 NOTE — TELEPHONE ENCOUNTER
Patient is done with 8 week therapy.   Informed on the message below, patient verbalized understanding.    Rupa Canales RN

## 2021-03-10 DIAGNOSIS — R79.89 LOW VITAMIN D LEVEL: ICD-10-CM

## 2021-03-11 RX ORDER — ERGOCALCIFEROL 1.25 MG/1
50000 CAPSULE, LIQUID FILLED ORAL WEEKLY
Qty: 8 CAPSULE | Refills: 0 | Status: CANCELLED | OUTPATIENT
Start: 2021-03-11

## 2021-03-11 NOTE — TELEPHONE ENCOUNTER
Please call patient: Patient would need repeat labs to continue high dose Vit D. She can just continue taking 2000 units vit d from OTC daily for now.  Thank you,  LEEANN Kitchen, NP-C  [facility]

## 2021-03-11 NOTE — TELEPHONE ENCOUNTER
Pharmacy is requesting refill for this medication.    vitamin D2 (ERGOCALCIFEROL) 94534 units (1250 mcg) capsule 8 capsule 0 1/7/2021 2/26/2021           Jarrod Faarax  [facility]

## 2021-03-12 NOTE — TELEPHONE ENCOUNTER
This writer attempted to contact pt on 03/12/21      Reason for call relay message and left message.      If patient calls back:   Relay message Patient would need repeat labs to continue high dose Vit D. She can just continue taking 2000 units vit d from OTC daily for now., (read verbatim), document that pt called and close encounter        May Queen

## 2021-03-16 NOTE — TELEPHONE ENCOUNTER
Relayed message to pt.  Pt is fine with taking OTC vit D for now.  If that changes, she knows to schedule lab/visit    SULEIMAN Hurtado.

## 2021-04-01 NOTE — TELEPHONE ENCOUNTER
This writer attempted to contact pt on 04/01/21      Reason for call albuterol inhaler  and left message.      If patient calls back:   Find out if the inhaler is helpful to pt or not. Can adjust order as needed once finding out this information          Hanny Clarke, CMA

## 2021-04-17 ENCOUNTER — HEALTH MAINTENANCE LETTER (OUTPATIENT)
Age: 21
End: 2021-04-17

## 2021-05-18 ENCOUNTER — VIRTUAL VISIT (OUTPATIENT)
Dept: FAMILY MEDICINE | Facility: CLINIC | Age: 21
End: 2021-05-18
Payer: COMMERCIAL

## 2021-05-18 DIAGNOSIS — L70.0 ACNE VULGARIS: Primary | ICD-10-CM

## 2021-05-18 PROCEDURE — 99214 OFFICE O/P EST MOD 30 MIN: CPT | Mod: 95 | Performed by: FAMILY MEDICINE

## 2021-05-18 RX ORDER — TRETINOIN 0.1 MG/G
GEL TOPICAL AT BEDTIME
Qty: 15 G | Refills: 1 | Status: SHIPPED | OUTPATIENT
Start: 2021-05-18 | End: 2021-09-28

## 2021-05-18 RX ORDER — SULFAMETHOXAZOLE/TRIMETHOPRIM 800-160 MG
1 TABLET ORAL DAILY
Qty: 30 TABLET | Refills: 1 | Status: SHIPPED | OUTPATIENT
Start: 2021-05-18 | End: 2021-08-13

## 2021-05-18 NOTE — PROGRESS NOTES
Cindy is a 21 year old who is being evaluated via a billable video visit.      How would you like to obtain your AVS? MyChart  If the video visit is dropped, the invitation should be resent by:   Will anyone else be joining your video visit? No      Assessment & Plan     (L70.0) Acne vulgaris  (primary encounter diagnosis)  Comment: Mild response to adapalene, but not to the satisfaction of the patient  Plan: tretinoin (RETIN-A) 0.01 % external gel,         sulfamethoxazole-trimethoprim (BACTRIM DS)         800-160 MG tablet        As she continues with the tretinoin, I expect that her need for the antibiotic pill will gradually fade.  Follow-up as needed      Avinash Styles MD  [facility]    Elton White is a 21 year old who presents for the following health issues     HPI     Rash (acne)      Duration: years    Description  Location: face, chest  Itching: no    Intensity:  moderate    Accompanying signs and symptoms: None    History (similar episodes/previous evaluation): None    Precipitating or alleviating factors:  New exposures:  None  Recent travel: no      Therapies tried and outcome: (face washes, etc) not very effective.  Differen, prescribed originally (Dr. Bullard, February), but while in [place] was told to buy it over-the-counter.        Past medical, family, and social histories, medications, and allergies are reviewed and updated in Epic.  Allergies: Patient has no known allergies.    Review Of Systems:       Objective:           There were no vitals taken for this visit.   GENERAL: Healthy, alert and no distress  EYES: Eyes grossly normal to inspection.  No discharge or erythema, or obvious scleral/conjunctival abnormalities.  RESP: No audible wheeze, cough, or visible cyanosis.  No visible retractions or increased work of breathing.    SKIN: Visible skin notable for facial acne, however the video resolution is too low to allow for a precise description  NEURO:  Cranial nerves grossly intact.  Mentation and speech appropriate for age.  PSYCH: Mentation appears normal, affect normal/bright, judgement and insight intact, normal speech and appearance well-groomed.      Diagnostic Test Results:      =====    36 minutes spent on the date of the encounter doing chart review, patient visit and documentation     Avinash Styles MD      Video-Visit Details    Type of service:  Video Visit    Video Start Time: 2:12 PM     Video End Time:2:29 PM    Originating Location (pt. Location): Home    Distant Location (provider location):  [facility]     Platform used for Video Visit: [facility]

## 2021-05-27 NOTE — TELEPHONE ENCOUNTER
Called pt and LVM asking her to call back if she needs this medication. But because we reached out to the patient 3 different times, can you please remove the pended med and close the encounter? I do not have security to close this. Thanks!

## 2021-06-11 ENCOUNTER — VIRTUAL VISIT (OUTPATIENT)
Dept: FAMILY MEDICINE | Facility: CLINIC | Age: 21
End: 2021-06-11
Payer: COMMERCIAL

## 2021-06-11 DIAGNOSIS — L70.0 ACNE VULGARIS: Primary | ICD-10-CM

## 2021-06-11 PROCEDURE — 99213 OFFICE O/P EST LOW 20 MIN: CPT | Mod: 95 | Performed by: FAMILY MEDICINE

## 2021-06-11 NOTE — PROGRESS NOTES
"Cindy is a 21 year old who is being evaluated via a billable video visit.      How would you like to obtain your AVS? MyChart  If the video visit is dropped, the invitation should be resent by:   Will anyone else be joining your video visit? No        Assessment & Plan     (L70.0) Acne vulgaris  (primary encounter diagnosis)  Comment: Improved on a regimen of tretinoin TMP-[id number]  Plan: Advised the patient to continue to use the topical tretinoin as a preventative/maintenance medication against her acne.  She can stop and start the Bactrim as needed for flareups of her acne.  Follow-up in 1 year.  Okay for RN to refill through April 2022        Return in about 11 months (around 5/18/2022) for recheck medications.    Avinash Styles MD  [facility]    Elton White is a 21 year old who presents for the following health issues     HPI         Medication Followup of acne treatment    Taking Medication as prescribed: yes    Side Effects:  None    Medication Helping Symptoms:  yes   Patient notes that, unlike her previous treatment, medications prescribed last month have made a significant difference in her back.  She inquires about whether or not she should stop using the antibiotics, and if she does what should she do?  \"How can I keep my acne under control keep my skin clear?\"      Review of Systems   CONSTITUTIONAL: NEGATIVE for fever, chills, change in weight  ENT/MOUTH: NEGATIVE for ear, mouth and throat problems  RESP: NEGATIVE for significant cough or SOB  CV: NEGATIVE for chest pain, palpitations or peripheral edema      Objective           Vitals:  No vitals were obtained today due to virtual visit.    Physical Exam   GENERAL: Healthy, alert and no distress  EYES: Eyes grossly normal to inspection.  No discharge or erythema, or obvious scleral/conjunctival abnormalities.  RESP: No audible wheeze, cough, or visible cyanosis.  No visible retractions or increased work of " breathing.    SKIN: no suspicious lesions or rashes, and acne is less prominent, rather difficult to discern this time on video  NEURO: Cranial nerves grossly intact.  Mentation and speech appropriate for age.  PSYCH: Mentation appears normal, affect normal/bright, judgement and insight intact, normal speech and appearance well-groomed.                Video-Visit Details    Type of service:  Video Visit    Video Start Time: 2:24 PM    Video End Time:2:33 PM    Originating Location (pt. Location): Home    Distant Location (provider location):  [facility]     Platform used for Video Visit: OzVision

## 2021-07-16 ENCOUNTER — OFFICE VISIT (OUTPATIENT)
Dept: URGENT CARE | Facility: URGENT CARE | Age: 21
End: 2021-07-16
Payer: COMMERCIAL

## 2021-07-16 VITALS
TEMPERATURE: 98.6 F | RESPIRATION RATE: 16 BRPM | HEART RATE: 88 BPM | OXYGEN SATURATION: 100 % | DIASTOLIC BLOOD PRESSURE: 87 MMHG | SYSTOLIC BLOOD PRESSURE: 144 MMHG

## 2021-07-16 DIAGNOSIS — R03.0 ELEVATED BLOOD PRESSURE READING WITHOUT DIAGNOSIS OF HYPERTENSION: ICD-10-CM

## 2021-07-16 DIAGNOSIS — B00.1 COLD SORE: Primary | ICD-10-CM

## 2021-07-16 PROCEDURE — 87529 HSV DNA AMP PROBE: CPT | Performed by: STUDENT IN AN ORGANIZED HEALTH CARE EDUCATION/TRAINING PROGRAM

## 2021-07-16 PROCEDURE — 99213 OFFICE O/P EST LOW 20 MIN: CPT | Performed by: STUDENT IN AN ORGANIZED HEALTH CARE EDUCATION/TRAINING PROGRAM

## 2021-07-16 RX ORDER — VALACYCLOVIR HYDROCHLORIDE 1 G/1
2000 TABLET, FILM COATED ORAL 2 TIMES DAILY
Qty: 4 TABLET | Refills: 3 | Status: SHIPPED | OUTPATIENT
Start: 2021-07-16 | End: 2021-12-08

## 2021-07-16 NOTE — PROGRESS NOTES
Assessment & Plan     Cold sore  Discussed use of valtrex when prodromal symptoms arise, typical clinical course, measures of preventing spread, and lifestyle measures to avoid outbreaks including adequate sleep, low stress levels, etc. Pt in agreement with plan. Swab sent from cluster of sores on lip.  - valACYclovir (VALTREX) 1000 mg tablet  Dispense: 4 tablet; Refill: 3  - HSV Types 1 and 2 Qualitative PCR CSF    Elevated blood pressure reading without diagnosis of hypertension  Pt plans to make follow up appointment with PCP to discuss given longstanding history. Has home BP cuff but has not been using it.             Return if symptoms worsen or fail to improve.    Paulina Mcbride MD  [facility] MARIZA White is a 21 year old female who presents to clinic today for the following health issues:  Chief Complaint   Patient presents with     Mouth/Lip Problem     Notice a sore on upper lip on Monday, upper lip is swollen, discharges pus.     HPI  Rash  Onset of rash was 4 day(s) ago.   Course of illness is sudden onset.  Severity mild  Current and Associated symptoms: red, blistering and swollen   Location of the rash: upper lip.  Previous history of a similar rash? No  Recent exposure history: none known  Denies exposure to: none known  Associated symptoms include: has slight cough and rhinorrhea.  Treatment measures tried include: moisturizer - thought was pimple so tried popping it      Review of Systems  Constitutional, dermatologic, cardiovascular, gu systems are negative, except as otherwise noted.      Objective    BP (!) 144/87   Pulse 88   Temp 98.6  F (37  C) (Oral)   Resp 16   LMP 07/06/2021   SpO2 100%   Breastfeeding No   Physical Exam   GEN: Alert and appropriately interactive for age  EYES: Eyes grossly normal to inspection, conjunctivae and sclerae normal  HENT: Upper lip swollen with cluster of 5 crusted vesicles on center of upper lip with  minimal drainage on erythematous base  RESP: Breathing comfortably on room air   CV: Warm and well perfused peripheral extremities   MS: no gross musculoskeletal defects noted, no edema  NEURO: Alert and oriented. Gait normal. Speech fluent.    PSYCH: Affect normal/bright. Appearance well groomed.

## 2021-07-16 NOTE — PATIENT INSTRUCTIONS
Patient Education     Understanding Cold Sores  Cold sores (fever blisters) are small sores or blisters on the lip. Sometimes they are inside the mouth. Many people get them from time to time. Cold sores often are not serious. They often heal in a few days, sometimes longer. They are caused by 2 related viruses, herpes simplex type 1 (HSV-1) and herpes simplex type 2 (HSV-2). These viruses spread very easily. Many people have one or both of these viruses in their body. More than 4 in every 5 people are infected with HSV-1. This is also the most common cause of cold sores. Once you have the virus that causes cold sores, it stays in your body for the rest of your life. But it can be inactive for long periods.   What causes a cold sore?  Cold sores are often caused by HSV-1. In some cases they are caused by HSV-2. HSV-2 is the more common cause of genital sores. The herpes viruses can enter the body through a break in the skin such as a scrape. Or they may enter through mucous membranes such as the lips or mouth. Some ways to get the viruses include:     Kissing someone who has a cold sore    Sharing a drinking glass, eating utensils, or lip balm with someone who has a cold sore    Having sex with someone who has a cold sore  A  baby can also get the infection at birth.  If you have a herpes virus, you can pass it along even when you don t have a sore.   Cold sores flare up from time to time. Things that can cause an outbreak include:     Sun exposure    Fever    Stress or exhaustion    Menstruation    Skin irritation    Another unrelated illness such as pneumonia, urinary infection, or cancer  What are the symptoms of a cold sore?  Symptoms can include:    A blister-like sore or cluster of sores. These often occur at the edge of the lips but may appear inside the mouth.    Skin redness around the sores.    Pain or itching in the area of the outbreak. Often the pain or itching starts 12 to 24 hours before the  sore is visible.    Flu-like symptoms, including swollen glands, headache, body ache, or fever. These typically occur only at the time of the first infection.  Cold sores may also occur on fingers. They may rarely infect the eyes, a serious possible complication.   Some people have symptoms a day or 2 before an outbreak. They may feel soreness, burning, itching, or tingling before a cold sore appears. Cold sores often come back in the same area that they first appeared.   How are cold sores treated?  Treatment for cold sores focuses on easing and shortening symptoms. For people with frequent outbreaks, treatment works to decrease how often and how symptomatic future episodes will be. Treatments may include:     Prescription or over-the-counter pain medicines. These can help with mild pain, especially if sores are inside the mouth.    Antiviral medicines. These may be pills that are taken by mouth or a cream to apply to sores. They may help shorten an outbreak and reduce the severity of symptoms. They may be used to help prevent future outbreaks if you have infections that keep coming back.    Self-care such as extra rest and drinking more fluids. These may help ease the flu-like symptoms of a first outbreak.  How are cold sores diagnosed?  Your healthcare provider makes the diagnosis mainly by looking at the sores and doing a clinical exam. This may be confirmed by swab tests or blood tests.   How can I prevent cold sores?  You can help reduce the spread of the herpes viruses that cause cold sores. This can help prevent both you and others from getting cold sores. Follow these tips:     Don't kiss others if you have a cold sore. Also don't kiss someone with a cold sore.    Don't share eating utensils, lip balm, razors, or towels with someone who has a cold sore.    Wash your hands after touching the area of a cold sore. The herpes virus can be carried from your face to your hands when you touch the area of a cold  sore. When this happens, wash your hands thoroughly, for at least 20 seconds. When you can t wash with soap and water, use an alcohol-based hand .    Disinfect things you touch often, such as phones and keyboards.      If you feel a cold sore coming on, do the same things you would do when a cold sore is present to prevent spreading the virus.    Use condoms to help prevent passing on the viruses through sex.  What are the possible complications of a cold sore?  Cold sores often go away by themselves within a few days. In some cases it may take 1 to 2 weeks or longer. For most people, cold sores are not serious. But the viruses that cause cold sores can cause more serious illness. People who have a weak immune system may get more serious infections from herpes viruses. These include people being treated for cancer or people who have HIV. Babies may also become very ill from a herpes infection.    When should I call my healthcare provider?  Call your healthcare provider right away if you have any of these:    Fever of 100.4 F (38 C) or higher, or as directed by your provider    Pain that gets worse    You can't eat or drink because of painful sores    Symptoms don t get better in 5 to 7 days    Blisters spread beyond the mouth or lip to areas on the chest, arms, face (especially the eyes), or legs

## 2021-07-18 LAB
HSV1 DNA SPEC QL NAA+PROBE: NOT DETECTED
HSV2 DNA SPEC QL NAA+PROBE: NOT DETECTED

## 2021-07-26 DIAGNOSIS — F32.1 MAJOR DEPRESSIVE DISORDER, SINGLE EPISODE, MODERATE (H): ICD-10-CM

## 2021-07-26 DIAGNOSIS — F41.9 ANXIETY: ICD-10-CM

## 2021-07-27 RX ORDER — SERTRALINE HYDROCHLORIDE 100 MG/1
TABLET, FILM COATED ORAL
Qty: 90 TABLET | Refills: 0 | Status: SHIPPED | OUTPATIENT
Start: 2021-07-27 | End: 2021-09-28

## 2021-07-27 NOTE — TELEPHONE ENCOUNTER
Prescription approved per [facility] Refill Protocol.      Linda Kaiser RN  [facility]

## 2021-08-20 PROBLEM — L70.0 ACNE VULGARIS: Status: ACTIVE | Noted: 2021-08-20

## 2021-09-17 ENCOUNTER — MYC REFILL (OUTPATIENT)
Dept: FAMILY MEDICINE | Facility: CLINIC | Age: 21
End: 2021-09-17

## 2021-09-17 DIAGNOSIS — F41.9 ANXIETY: ICD-10-CM

## 2021-09-17 DIAGNOSIS — F32.1 MAJOR DEPRESSIVE DISORDER, SINGLE EPISODE, MODERATE (H): ICD-10-CM

## 2021-09-17 RX ORDER — SERTRALINE HYDROCHLORIDE 100 MG/1
TABLET, FILM COATED ORAL
Qty: 90 TABLET | Refills: 0 | Status: CANCELLED | OUTPATIENT
Start: 2021-09-17

## 2021-09-23 ENCOUNTER — OFFICE VISIT (OUTPATIENT)
Dept: FAMILY MEDICINE | Facility: CLINIC | Age: 21
End: 2021-09-23
Payer: COMMERCIAL

## 2021-09-23 VITALS
WEIGHT: 152 LBS | OXYGEN SATURATION: 100 % | SYSTOLIC BLOOD PRESSURE: 134 MMHG | BODY MASS INDEX: 26.93 KG/M2 | HEIGHT: 63 IN | HEART RATE: 77 BPM | DIASTOLIC BLOOD PRESSURE: 87 MMHG | TEMPERATURE: 98.1 F

## 2021-09-23 DIAGNOSIS — R03.0 ELEVATED BP WITHOUT DIAGNOSIS OF HYPERTENSION: Primary | ICD-10-CM

## 2021-09-23 DIAGNOSIS — R10.84 ABDOMINAL PAIN, GENERALIZED: ICD-10-CM

## 2021-09-23 DIAGNOSIS — K64.4 EXTERNAL HEMORRHOIDS: ICD-10-CM

## 2021-09-23 DIAGNOSIS — R07.9 CHEST PAIN, UNSPECIFIED TYPE: ICD-10-CM

## 2021-09-23 PROCEDURE — 99214 OFFICE O/P EST MOD 30 MIN: CPT | Performed by: INTERNAL MEDICINE

## 2021-09-23 RX ORDER — HYDROCORTISONE 25 MG/G
CREAM TOPICAL 2 TIMES DAILY PRN
Qty: 30 G | Refills: 0 | Status: SHIPPED | OUTPATIENT
Start: 2021-09-23 | End: 2021-12-08

## 2021-09-23 ASSESSMENT — PAIN SCALES - GENERAL: PAINLEVEL: SEVERE PAIN (6)

## 2021-09-23 ASSESSMENT — MIFFLIN-ST. JEOR: SCORE: 1413.6

## 2021-09-23 NOTE — PROGRESS NOTES
"    Assessment & Plan     Elevated BP without diagnosis of hypertension  She has been monitoring her blood pressure at home sometimes the readings are in the range of 140/90  Discussed about decreasing the salt in food  Discussed about therapeutic lifestyle changes like losing weight  She can continue to monitor her blood pressure and in 3 months after implementing therapeutic lifestyle changes the pressure is too elevated then will start treatment    Abdominal pain, generalized  She has generalized abdominal pain  More like pressure sensation  Moves bowels only once a day and even with this she does not have satisfactory feeling of emptying the bowels  - magnesium citrate solution; Take 296 mLs by mouth once for 1 dose  - H Pylori antigen stool; Future  - H Pylori antigen stool    External hemorrhoids  She has hemorrhoids that pop up more than caused extreme pain  Could be because of her constipation  - Colorectal Surgery Referral; Future  - hydrocortisone, Perianal, (HYDROCORTISONE) 2.5 % cream; Place rectally 2 times daily as needed for hemorrhoids    Chest pain, unspecified type  This only comes in twinges and lasts a few seconds  Reassured  She does not get any pain on physical exertion so unlikely to be of cardiac origin    Of note her TRAVIS and PHQ-9 scores are high  Patient tells me that she had a traumatic past  Currently on Zoloft  Does not have any thoughts of suicide or homicide    30 minutes spent on the date of the encounter doing chart review, history and exam, documentation and further activities per the note       BMI:   Estimated body mass index is 26.93 kg/m  as calculated from the following:    Height as of this encounter: 1.6 m (5' 3\").    Weight as of this encounter: 68.9 kg (152 lb).   Weight management plan: Discussed healthy diet and exercise guidelines        Return in about 3 months (around 12/23/2021).    Sudarshan Britton MD  [facility] OLEG White is a " "21 year old who presents for the following health issues     HPI   Want to discuss few issues today  She wants to talk about  #1 chest pain which comes and twinges and lasts a few seconds  #2.  Her abdominal fullness feeling  #3.  Her hemorrhoids  #4.  Her blood pressure          Review of Systems   Constitutional, HEENT, cardiovascular, pulmonary, gi and gu systems are negative, except as otherwise noted.      Objective    /87 (BP Location: Left arm, Patient Position: Chair, Cuff Size: Adult Regular)   Pulse 77   Temp 98.1  F (36.7  C) (Tympanic)   Ht 1.6 m (5' 3\")   Wt 68.9 kg (152 lb)   LMP 09/17/2021   SpO2 100%   BMI 26.93 kg/m    Body mass index is 26.93 kg/m .  Physical Exam   GENERAL: healthy, alert and no distress  EYES: Eyes grossly normal to inspection, PERRL and conjunctivae and sclerae normal  RESP: lungs clear to auscultation - no rales, rhonchi or wheezes  BREAST: normal without masses, tenderness or nipple discharge and no palpable axillary masses or adenopathy  CV: regular rate and rhythm, normal S1 S2, no S3 or S4, no murmur, click or rub, no peripheral edema and peripheral pulses strong  ABDOMEN: soft, nontender, no hepatosplenomegaly, no masses and bowel sounds normal  MS: no gross musculoskeletal defects noted, no edema  SKIN: no suspicious lesions or rashes  NEURO: Normal strength and tone, mentation intact and speech normal  PSYCH: mentation appears normal, affect normal/bright                "

## 2021-09-23 NOTE — PATIENT INSTRUCTIONS
At [facility], we strive to deliver an exceptional experience to you, every time we see you. If you receive a survey, please complete it as we do value your feedback.  If you have MyChart, you can expect to receive results automatically within 24 hours of their completion.  Your provider will send a note interpreting your results as well.   If you do not have MyChart, you should receive your results in about a week by mail.    Your care team:                            Family Medicine Internal Medicine   MD Randall Espino MD Shantel Branch-Fleming, MD Srinivasa Vaka, MD Katya Belousova, PALAURA Phipps, APRN CNP    Berry Burns, MD Pediatrics   Kike Mojica, PALAURA Aguilar, CNP MD Xiomy Phan APRN CNP   MD Linda Matta MD Deborah Mielke, MD Kim Thein, APRN [facility]      Clinic hours: Monday - Thursday 7 am-6 pm; Fridays 7 am-5 pm.   Urgent care: Monday - Friday 10 am- 8 pm; Saturday and Sunday 9 am-5 pm.    Clinic: [phone]       [facility]: Monday - Thursday 8 am - 7 pm; Friday 8 am - 6 pm  [facility]: [phone]     Use [email or web address] for 24/7 diagnosis and treatment of dozens of conditions.    Patient Education     Hemorrhoids    Hemorrhoids are swollen and inflamed veins inside the rectum and near the anus. The rectum is the last several inches of the colon. The anus is the passage between the rectum and the outside of the body.   Causes  The veins can become swollen due to increased pressure in them. This is most often caused by:     Chronic constipation or diarrhea    Straining when having a bowel movement    Sitting too long on the toilet    A low-fiber diet    Pregnancy  Symptoms    Bleeding from the rectum. You may notice this after bowel movements.    Lump near the anus    Itching around the anus    Pain around the anus    Mucus leaks from  the anus  There are different types of hemorrhoids. Depending on the type you have and the severity, you may be able to treat yourself at home. In some cases, a procedure may be the best treatment option. Your healthcare provider can tell you more about this, if needed.   Home care  General care    To get relief from pain or itching, try:  ? Medicines. Your healthcare provider may recommend stool softeners, suppositories, or laxatives to help manage constipation. Use these exactly as directed.  ? Sitz baths. A sitz bath involves sitting in a few inches of warm bath water. Be careful not to make the water so hot that you burn yourself--test it before sitting in it. Soak for about 10 to 15 minutes a few times a day. This may help relieve pain.  ? Topical products. Your healthcare provider may prescribe or recommend creams, ointments, or pads that can be applied to the hemorrhoid. Use these exactly as directed.  Tips to help prevent hemorrhoids     Eat more fiber. Fiber adds bulk to stool and absorbs water as it moves through your colon. This makes stool softer and easier to pass.  ? Increase the fiber in your diet with more fiber-rich foods. These include fresh fruit, vegetables, and whole grains.  ? Take a fiber supplement or bulking agent, if advised by your healthcare provider. These include products such as psyllium or methylcellulose.    Drink more water. Your healthcare provider may direct you to drink plenty of water. This can help keep stool soft.    Be more active. Frequent exercise aids digestion and helps prevent constipation. It may also help make bowel movements more regular.    Don t strain during bowel movements. This can make hemorrhoids more likely. Also, don t sit on the toilet for long periods of time.    Follow-up care  Follow up with your healthcare provider as advised. If a culture or imaging tests were done, someone will let you know the results when they are ready. This may take a few days or  longer. If your healthcare provider recommends a procedure for your hemorrhoids, these options can be discussed. Options may include surgery and outpatient office treatments.   When to seek medical advice  Call your healthcare provider right away if any of these occur:     Increased bleeding from the rectum    Increased pain around the rectum or anus    Weakness or dizziness  Call 911  Call 911 if any of these occur:     Trouble breathing or swallowing    Fainting or loss of consciousness    Unusually fast heart rate    Vomiting blood    Large amounts of blood in stool or black, tarry stools  [employer] last reviewed this educational content on 8/1/2019 2000-2021 [employer]. All rights reserved. This information is not intended as a substitute for professional medical care. Always follow your healthcare professional's instructions.

## 2021-09-26 ENCOUNTER — HEALTH MAINTENANCE LETTER (OUTPATIENT)
Age: 21
End: 2021-09-26

## 2021-09-27 PROCEDURE — 87338 HPYLORI STOOL AG IA: CPT | Performed by: INTERNAL MEDICINE

## 2021-09-27 ASSESSMENT — ANXIETY QUESTIONNAIRES
7. FEELING AFRAID AS IF SOMETHING AWFUL MIGHT HAPPEN: NEARLY EVERY DAY
GAD7 TOTAL SCORE: 15
IF YOU CHECKED OFF ANY PROBLEMS ON THIS QUESTIONNAIRE, HOW DIFFICULT HAVE THESE PROBLEMS MADE IT FOR YOU TO DO YOUR WORK, TAKE CARE OF THINGS AT HOME, OR GET ALONG WITH OTHER PEOPLE: SOMEWHAT DIFFICULT
1. FEELING NERVOUS, ANXIOUS, OR ON EDGE: SEVERAL DAYS
2. NOT BEING ABLE TO STOP OR CONTROL WORRYING: NEARLY EVERY DAY
3. WORRYING TOO MUCH ABOUT DIFFERENT THINGS: MORE THAN HALF THE DAYS
5. BEING SO RESTLESS THAT IT IS HARD TO SIT STILL: NOT AT ALL
6. BECOMING EASILY ANNOYED OR IRRITABLE: NEARLY EVERY DAY

## 2021-09-27 ASSESSMENT — PATIENT HEALTH QUESTIONNAIRE - PHQ9
SUM OF ALL RESPONSES TO PHQ QUESTIONS 1-9: 10
5. POOR APPETITE OR OVEREATING: NEARLY EVERY DAY

## 2021-09-28 ENCOUNTER — VIRTUAL VISIT (OUTPATIENT)
Dept: FAMILY MEDICINE | Facility: CLINIC | Age: 21
End: 2021-09-28
Payer: COMMERCIAL

## 2021-09-28 DIAGNOSIS — F32.0 MAJOR DEPRESSIVE DISORDER, SINGLE EPISODE, MILD (H): ICD-10-CM

## 2021-09-28 DIAGNOSIS — L70.0 ACNE VULGARIS: Primary | ICD-10-CM

## 2021-09-28 DIAGNOSIS — F41.9 ANXIETY: ICD-10-CM

## 2021-09-28 PROBLEM — F32.9 MAJOR DEPRESSIVE DISORDER, SINGLE EPISODE: Status: ACTIVE | Noted: 2020-11-27

## 2021-09-28 PROCEDURE — 96127 BRIEF EMOTIONAL/BEHAV ASSMT: CPT | Mod: 95 | Performed by: FAMILY MEDICINE

## 2021-09-28 PROCEDURE — 99215 OFFICE O/P EST HI 40 MIN: CPT | Mod: 95 | Performed by: FAMILY MEDICINE

## 2021-09-28 RX ORDER — SERTRALINE HYDROCHLORIDE 100 MG/1
100 TABLET, FILM COATED ORAL DAILY
Qty: 90 TABLET | Refills: 1 | Status: SHIPPED | OUTPATIENT
Start: 2021-09-28 | End: 2021-11-16

## 2021-09-28 RX ORDER — TRETINOIN 0.1 MG/G
GEL TOPICAL AT BEDTIME
Qty: 15 G | Refills: 1 | Status: SHIPPED | OUTPATIENT
Start: 2021-09-28 | End: 2021-12-08

## 2021-09-28 ASSESSMENT — ANXIETY QUESTIONNAIRES: GAD7 TOTAL SCORE: 15

## 2021-09-28 NOTE — TELEPHONE ENCOUNTER
RECORDS RECEIVED FROM: External Hemorrhoids   Appt date: 12/13/2021   NOTES STATUS DETAILS   OFFICE NOTE from referring provider  Internal [place] [facility]:  9/23/21 - PCC OV with Dr. Britton   OFFICE NOTE from other specialist   Internal [facility]:  3/22/19 - PCC OV with Adalgisa Armando NP   DISCHARGE SUMMARY from hospital  N/A    DISCHARGE REPORT from the ER N/A    OPERATIVE REPORT  N/A    MEDICATION LIST Internal    LABS     [facility] TESTING Internal ealth:  9/27/21 - Stool Sample  1/7/21 - Anitgen Stool   ANAL PAP N/A    BIOPSIES/PATHOLOGY RELATED TO DIAGNOSIS N/A    DIAGNOSTIC PROCEDURES     COLONOSCOPY N/A    UPPER ENDOSCOPY (EGD) N/A    FLEX SIGMOIDOSCOPY  N/A    ERCP N/A    IMAGING (DISC & REPORT)      CT  N/A    MRI N/A    XRAY N/A    ULTRASOUND (ENDOANAL/ENDORECTAL) N/A

## 2021-09-29 ENCOUNTER — DOCUMENTATION ONLY (OUTPATIENT)
Dept: FAMILY MEDICINE | Facility: CLINIC | Age: 21
End: 2021-09-29

## 2021-09-29 DIAGNOSIS — A04.8 H. PYLORI INFECTION: Primary | ICD-10-CM

## 2021-09-29 LAB — H PYLORI AG STL QL IA: POSITIVE

## 2021-09-29 RX ORDER — AMOXICILLIN 500 MG/1
1000 CAPSULE ORAL 2 TIMES DAILY
Qty: 56 CAPSULE | Refills: 0 | Status: SHIPPED | OUTPATIENT
Start: 2021-09-29 | End: 2021-12-08

## 2021-09-29 RX ORDER — PANTOPRAZOLE SODIUM 40 MG/1
40 TABLET, DELAYED RELEASE ORAL 2 TIMES DAILY
Qty: 28 TABLET | Refills: 0 | Status: SHIPPED | OUTPATIENT
Start: 2021-09-29 | End: 2021-10-26

## 2021-09-29 RX ORDER — CLARITHROMYCIN 500 MG
500 TABLET ORAL 2 TIMES DAILY
Qty: 28 TABLET | Refills: 0 | COMMUNITY
Start: 2021-09-29 | End: 2022-01-18

## 2021-10-25 DIAGNOSIS — A04.8 H. PYLORI INFECTION: ICD-10-CM

## 2021-10-26 RX ORDER — PANTOPRAZOLE SODIUM 40 MG/1
TABLET, DELAYED RELEASE ORAL
Qty: 180 TABLET | Refills: 0 | Status: SHIPPED | OUTPATIENT
Start: 2021-10-26 | End: 2021-10-28

## 2021-10-28 ENCOUNTER — TELEPHONE (OUTPATIENT)
Dept: FAMILY MEDICINE | Facility: CLINIC | Age: 21
End: 2021-10-28

## 2021-10-28 DIAGNOSIS — A04.8 H. PYLORI INFECTION: ICD-10-CM

## 2021-10-28 NOTE — TELEPHONE ENCOUNTER
pantoprazole (PROTONIX) 40 MG EC tablet 180 tablet 0 10/26/2021     Plan does not cover this medication. Please call plan at [phone] to initiate PA or call/fax pharmacy to change medication. Patient ID # is [id number]

## 2021-10-28 NOTE — TELEPHONE ENCOUNTER
Pt notified of provider message as written.  Pt verbalized good understanding.  Sirisha Piña BSN, RN

## 2021-10-28 NOTE — TELEPHONE ENCOUNTER
Please call patient and inform her that if she needs to take Protonix for reflux then the insurance is not covering it  She can buy omeprazole that is Prilosec over-the-counter  She does not need Protonix anymore for H. pylori as she finished treatment for this

## 2021-11-13 DIAGNOSIS — F41.9 ANXIETY: ICD-10-CM

## 2021-11-13 DIAGNOSIS — F32.0 MAJOR DEPRESSIVE DISORDER, SINGLE EPISODE, MILD (H): ICD-10-CM

## 2021-11-16 ENCOUNTER — OFFICE VISIT (OUTPATIENT)
Dept: FAMILY MEDICINE | Facility: CLINIC | Age: 21
End: 2021-11-16
Payer: COMMERCIAL

## 2021-11-16 VITALS
HEART RATE: 99 BPM | TEMPERATURE: 98 F | SYSTOLIC BLOOD PRESSURE: 128 MMHG | HEIGHT: 64 IN | DIASTOLIC BLOOD PRESSURE: 84 MMHG | BODY MASS INDEX: 26.98 KG/M2 | WEIGHT: 158 LBS | OXYGEN SATURATION: 99 %

## 2021-11-16 DIAGNOSIS — R10.2 SUPRAPUBIC CRAMPING: Primary | ICD-10-CM

## 2021-11-16 LAB
ALBUMIN UR-MCNC: NEGATIVE MG/DL
APPEARANCE UR: CLEAR
BACTERIA #/AREA URNS HPF: ABNORMAL /HPF
BILIRUB UR QL STRIP: NEGATIVE
COLOR UR AUTO: YELLOW
GLUCOSE UR STRIP-MCNC: NEGATIVE MG/DL
HCG UR QL: NEGATIVE
HGB UR QL STRIP: NEGATIVE
KETONES UR STRIP-MCNC: NEGATIVE MG/DL
LEUKOCYTE ESTERASE UR QL STRIP: NEGATIVE
NITRATE UR QL: NEGATIVE
PH UR STRIP: 6 [PH] (ref 5–7)
RBC #/AREA URNS AUTO: ABNORMAL /HPF
SP GR UR STRIP: 1.02 (ref 1–1.03)
SQUAMOUS #/AREA URNS AUTO: ABNORMAL /LPF
UROBILINOGEN UR STRIP-ACNC: 0.2 E.U./DL
WBC #/AREA URNS AUTO: ABNORMAL /HPF

## 2021-11-16 PROCEDURE — 99213 OFFICE O/P EST LOW 20 MIN: CPT | Performed by: PHYSICIAN ASSISTANT

## 2021-11-16 PROCEDURE — 87086 URINE CULTURE/COLONY COUNT: CPT | Performed by: PHYSICIAN ASSISTANT

## 2021-11-16 PROCEDURE — 81025 URINE PREGNANCY TEST: CPT | Performed by: PHYSICIAN ASSISTANT

## 2021-11-16 PROCEDURE — 81001 URINALYSIS AUTO W/SCOPE: CPT | Performed by: PHYSICIAN ASSISTANT

## 2021-11-16 RX ORDER — SERTRALINE HYDROCHLORIDE 100 MG/1
100 TABLET, FILM COATED ORAL DAILY
Qty: 30 TABLET | Refills: 0 | Status: SHIPPED | OUTPATIENT
Start: 2021-11-16 | End: 2022-01-25

## 2021-11-16 ASSESSMENT — PAIN SCALES - GENERAL: PAINLEVEL: MODERATE PAIN (5)

## 2021-11-16 ASSESSMENT — MIFFLIN-ST. JEOR: SCORE: 1452.71

## 2021-11-16 NOTE — TELEPHONE ENCOUNTER
Routing refill request to provider for review/approval because:  PHQ9 out of range.      PHQ-9 score:    PHQ 9/27/2021   PHQ-9 Total Score 10   Q9: Thoughts of better off dead/self-harm past 2 weeks Not at all               Joaquina Hill RN  [facility]

## 2021-11-16 NOTE — PROGRESS NOTES
Assessment & Plan   Problem List Items Addressed This Visit     None      Visit Diagnoses     Suprapubic cramping    -  Primary    Relevant Orders    UA with Microscopic reflex to Culture - lab collect (Completed)    HCG Qual, Urine ([id number]) (Completed)    Urine Microscopic (Completed)    Urine Culture Aerobic Bacterial - lab collect       most likely menstrual cramping of the upcoming period    Ibuprofen 600 mg or Tylenol 1000 mg  every 6 hours as needed for cramping   Urine culture is pending   Follow up in 1-2 weeks if not better after period comes     15 minutes spent on the date of the encounter doing chart review, history and exam, documentation and further activities per the note        Return in about 2 weeks (around 11/30/2021), or if symptoms worsen or fail to improve.    Janis Plummer PA-C  [facility] is a 21 year [facility] who presents for the following health issues     HPI     Genitourinary - Female  Onset/Duration: 4-5 days  Description: pressure in the suprapubic region that feels like menstrual cramping. Patient is due for period to start today.   Painful urination (Dysuria): no           Frequency: YES  Blood in urine (Hematuria): no  Delay in urine (Hesitency): no  Intensity: mild, moderate  Progression of Symptoms:  same  Accompanying Signs & Symptoms:  Fever/chills: no  Flank pain: no  Nausea and vomiting: no  Vaginal symptoms: none  Abdominal/Pelvic Pain: YES suprapubic pressure  History:   History of frequent UTI s: YES  History of kidney stones: no  Sexually Active: YES  Possibility of pregnancy: Don't Know  Precipitating or alleviating factors: None  Therapies tried and outcome: none    Patient denies a possibility of STD, declined std tests     Review of Systems   Constitutional, HEENT, cardiovascular, pulmonary, gi and gu systems are negative, except as otherwise noted.      Objective    /84 (BP Location: Left arm, Patient  "Position: Chair, Cuff Size: Adult Regular)   Pulse 99   Temp 98  F (36.7  C) (Tympanic)   Ht 1.619 m (5' 3.75\")   Wt 71.7 kg (158 lb)   LMP 10/16/2021   SpO2 99%   Breastfeeding No   BMI 27.33 kg/m    Body mass index is 27.33 kg/m .  Physical Exam   GENERAL: healthy, alert and no distress  NECK: no adenopathy, no asymmetry, masses, or scars and thyroid normal to palpation  RESP: lungs clear to auscultation - no rales, rhonchi or wheezes  CV: regular rate and rhythm, normal S1 S2, no S3 or S4, no murmur, click or rub, no peripheral edema and peripheral pulses strong  ABDOMEN: tenderness suprapubic, bowel sounds normal and no palpable or pulsatile masses  MS: no gross musculoskeletal defects noted, no edema  BACK: no CVA tenderness, no paralumbar tenderness  PSYCH: mentation appears normal, affect normal/bright    Results for orders placed or performed in visit on 11/16/21 (from the past 24 hour(s))   UA with Microscopic reflex to Culture - lab collect    Specimen: Urine, Midstream   Result Value Ref Range    Color Urine Yellow Colorless, Straw, Light Yellow, Yellow    Appearance Urine Clear Clear    Glucose Urine Negative Negative mg/dL    Bilirubin Urine Negative Negative    Ketones Urine Negative Negative mg/dL    Specific Gravity Urine 1.020 1.003 - 1.035    Blood Urine Negative Negative    pH Urine 6.0 5.0 - 7.0    Protein Albumin Urine Negative Negative mg/dL    Urobilinogen Urine 0.2 0.2, 1.0 E.U./dL    Nitrite Urine Negative Negative    Leukocyte Esterase Urine Negative Negative   Urine Microscopic   Result Value Ref Range    Bacteria Urine Few (A) None Seen /HPF    RBC Urine None Seen 0-2 /HPF /HPF    WBC Urine 0-5 0-5 /HPF /HPF    Squamous Epithelials Urine Moderate (A) None Seen /LPF    Narrative    Urine Culture not indicated   HCG Qual, Urine ([id number])   Result Value Ref Range    hCG Urine Qualitative Negative Negative               "

## 2021-11-16 NOTE — PATIENT INSTRUCTIONS
Ibuprofen 600 mg or Tylenol 1000 mg  every 6 hours as needed for cramping   Urine culture is pending   Follow up in 1-2 weeks if not better after period comes

## 2021-11-17 LAB — BACTERIA UR CULT: NORMAL

## 2021-12-08 ENCOUNTER — OFFICE VISIT (OUTPATIENT)
Dept: OBGYN | Facility: CLINIC | Age: 21
End: 2021-12-08
Payer: COMMERCIAL

## 2021-12-08 VITALS
BODY MASS INDEX: 26.66 KG/M2 | WEIGHT: 160 LBS | HEART RATE: 68 BPM | SYSTOLIC BLOOD PRESSURE: 116 MMHG | HEIGHT: 65 IN | DIASTOLIC BLOOD PRESSURE: 70 MMHG

## 2021-12-08 DIAGNOSIS — Z01.419 ENCOUNTER FOR GYNECOLOGICAL EXAMINATION WITHOUT ABNORMAL FINDING: Primary | ICD-10-CM

## 2021-12-08 DIAGNOSIS — Z31.69 ENCOUNTER FOR PRECONCEPTION CONSULTATION: ICD-10-CM

## 2021-12-08 PROBLEM — Z97.5 NEXPLANON IN PLACE: Status: RESOLVED | Noted: 2019-12-17 | Resolved: 2021-12-08

## 2021-12-08 PROCEDURE — 99385 PREV VISIT NEW AGE 18-39: CPT | Performed by: NURSE PRACTITIONER

## 2021-12-08 ASSESSMENT — ANXIETY QUESTIONNAIRES
GAD7 TOTAL SCORE: 14
6. BECOMING EASILY ANNOYED OR IRRITABLE: NEARLY EVERY DAY
IF YOU CHECKED OFF ANY PROBLEMS ON THIS QUESTIONNAIRE, HOW DIFFICULT HAVE THESE PROBLEMS MADE IT FOR YOU TO DO YOUR WORK, TAKE CARE OF THINGS AT HOME, OR GET ALONG WITH OTHER PEOPLE: SOMEWHAT DIFFICULT
GAD7 TOTAL SCORE: 14
7. FEELING AFRAID AS IF SOMETHING AWFUL MIGHT HAPPEN: NEARLY EVERY DAY
2. NOT BEING ABLE TO STOP OR CONTROL WORRYING: MORE THAN HALF THE DAYS
5. BEING SO RESTLESS THAT IT IS HARD TO SIT STILL: SEVERAL DAYS
5. BEING SO RESTLESS THAT IT IS HARD TO SIT STILL: SEVERAL DAYS
3. WORRYING TOO MUCH ABOUT DIFFERENT THINGS: NEARLY EVERY DAY
1. FEELING NERVOUS, ANXIOUS, OR ON EDGE: NOT AT ALL
3. WORRYING TOO MUCH ABOUT DIFFERENT THINGS: NEARLY EVERY DAY
IF YOU CHECKED OFF ANY PROBLEMS ON THIS QUESTIONNAIRE, HOW DIFFICULT HAVE THESE PROBLEMS MADE IT FOR YOU TO DO YOUR WORK, TAKE CARE OF THINGS AT HOME, OR GET ALONG WITH OTHER PEOPLE: SOMEWHAT DIFFICULT
2. NOT BEING ABLE TO STOP OR CONTROL WORRYING: MORE THAN HALF THE DAYS
7. FEELING AFRAID AS IF SOMETHING AWFUL MIGHT HAPPEN: NEARLY EVERY DAY
6. BECOMING EASILY ANNOYED OR IRRITABLE: NEARLY EVERY DAY
1. FEELING NERVOUS, ANXIOUS, OR ON EDGE: NOT AT ALL

## 2021-12-08 ASSESSMENT — PATIENT HEALTH QUESTIONNAIRE - PHQ9
5. POOR APPETITE OR OVEREATING: MORE THAN HALF THE DAYS
5. POOR APPETITE OR OVEREATING: MORE THAN HALF THE DAYS

## 2021-12-08 ASSESSMENT — MIFFLIN-ST. JEOR: SCORE: 1477.67

## 2021-12-08 NOTE — PROGRESS NOTES
Cindy is a 21 year old No obstetric history on file. female who presents for annual exam.     Besides routine health maintenance,  she would like to discuss Zoloft and trying to get pregnant.  New patient to me here today for her annual GYN exam.  She is  HPI:  The patient's PCP is Marjorie Duff MD. thinking of attempting pregnancy with her  here in the near future.  She is not on any contraception at this time.  She has normal cycles and will be due any day now.  She does have some central lower pelvic pain with her menstrual cycle but has been tested for UTI it was negative.    She has never been pregnant but her  has fathered a child.    She is on sertraline through her PCP and would like to go off of it for pending pregnancy.  She had plans on going off cold turkey    She had a normal Pap smear in 2019.      GYNECOLOGIC HISTORY:    Patient's last menstrual period was 11/10/2021.    Regular menses? no  Menses every 31-40 days.  Length of menses: 4-6 days    Her current contraception method is: none.  She  reports that she has never smoked. She has never used smokeless tobacco.    Patient is sexually active.  STD testing offered?  Declined  Last PHQ-9 score on record =   PHQ-9 SCORE 11/8/2021   PHQ-9 Total Score 5     Last GAD7 score on record =   TRAVIS-7 SCORE 11/8/2021   Total Score 14     Alcohol Score = 2    HEALTH MAINTENANCE:  Cholesterol: (No results found for: CHOL   Last Mammo: Not applicable, Result: Not applicable, Next Mammo: Due at age 38   Pap: (  Lab Results   Component Value Date    PAP NIL 03/22/2019    )    Colonoscopy:  NA, Result: Not applicable, Next Colonoscopy: due at 48 years.  Dexa:  NA  Health maintenance updated:  no, flu/tdap/covid- done    HISTORY:  OB History   No obstetric history on file.       Patient Active Problem List   Diagnosis     Health Care Home     Family history of liver failure     Nexplanon in place     Major depressive disorder, single episode      Acne vulgaris     No past surgical history on file.   Social History     Tobacco Use     Smoking status: Never Smoker     Smokeless tobacco: Never Used   Substance Use Topics     Alcohol use: Yes     Comment: occasional      Problem (# of Occurrences) Relation (Name,Age of Onset)    Asthma (2) Brother (Ellen), Brother    Breast Cancer (1) Maternal Aunt    Diabetes (2) Mother, Brother (Ellen)    Liver Disease (1) Mother: Liver transplant        Negative family history of: Coronary Artery Disease, Hypertension, Hyperlipidemia, Colon Cancer, Cerebrovascular Disease, Depression, Anxiety Disorder, Thyroid Disease, Seizure Disorder, Migraines            Current Outpatient Medications   Medication Sig     albuterol (PROAIR HFA/PROVENTIL HFA/VENTOLIN HFA) 108 (90 Base) MCG/ACT inhaler Inhale 2 puffs into the lungs every 4 hours as needed for shortness of breath / dyspnea     amoxicillin (AMOXIL) 500 MG capsule Take 2 capsules (1,000 mg) by mouth 2 times daily (Patient not taking: Reported on 11/16/2021)     clarithromycin (BIAXIN) 500 MG tablet Take 1 tablet (500 mg) by mouth 2 times daily     hydrocortisone, Perianal, (HYDROCORTISONE) 2.5 % cream Place rectally 2 times daily as needed for hemorrhoids (Patient not taking: Reported on 11/16/2021)     sertraline (ZOLOFT) 100 MG tablet Take 1 tablet (100 mg) by mouth daily Visit due for more.     sulfamethoxazole-trimethoprim (BACTRIM DS) 800-160 MG tablet TAKE 1 TABLET BY MOUTH DAILY FOR ACNE     tretinoin (RETIN-A) 0.01 % external gel Apply topically At Bedtime .  Apply thin layer and rub into clean, dry, acne-affected skin. (Patient not taking: Reported on 11/16/2021)     valACYclovir (VALTREX) 1000 mg tablet Take 2 tablets (2,000 mg) by mouth 2 times daily for 1 day     No current facility-administered medications for this visit.     No Known Allergies    Past medical, surgical, social and family histories were reviewed and updated in EPIC.    ROS:   12 point review of  systems negative other than symptoms noted below or in the HPI.  No urinary frequency or dysuria, bladder or kidney problems, Normal menstrual cycles    EXAM:  There were no vitals taken for this visit.   BMI: There is no height or weight on file to calculate BMI.    PHYSICAL EXAM:  Constitutional:   Appearance: Well nourished, well developed, alert, in no acute distress  Neck:  Lymph Nodes:  No lymphadenopathy present    Thyroid:  Gland size normal, nontender, no nodules or masses present  on palpation  Chest:  Respiratory Effort:  Breathing unlabored  Cardiovascular:    Heart: Auscultation:  Regular rate, normal rhythm, no murmurs present  Breasts: Inspection of Breasts:  No lymphadenopathy present., Palpation of Breasts and Axillae:  No masses present on palpation, no breast tenderness., Axillary Lymph Nodes:  No lymphadenopathy present., No nodularity, asymmetry or nipple discharge bilaterally. and bilateral nipple piercings in place  Gastrointestinal:   Abdominal Examination:  Abdomen nontender to palpation, tone normal without rigidity or guarding, no masses present, umbilicus without lesions   Liver and Spleen:  No hepatomegaly present, liver nontender to palpation    Hernias:  No hernias present  Lymphatic: Lymph Nodes:  No other lymphadenopathy present  Skin:  General Inspection:  No rashes present, no lesions present, no areas of  discoloration  Neurologic:    Mental Status:  Oriented X3.  Normal strength and tone, sensory exam                grossly normal, mentation intact and speech normal.    Psychiatric:   Mentation appears normal and affect normal/bright.         Pelvic Exam:  External Genitalia:     Normal appearance for age, no discharge present, no tenderness present, no inflammatory lesions present, color normal  Vagina:     Normal vaginal vault without central or paravaginal defects, no discharge present, no inflammatory lesions present, no masses present  Bladder:     Nontender to  "palpation  Urethra:   Urethral Body:  Urethra palpation normal, urethra structural support normal   Urethral Meatus:  No erythema or lesions present  Cervix:     Appearance healthy, no lesions present, nontender to palpation, no bleeding present  Uterus:     Uterus: firm, normal sized and nontender, anteverted in position.   Adnexa:     No adnexal tenderness present, no adnexal masses present  Perineum:     Perineum within normal limits, no evidence of trauma, no rashes or skin lesions present  Anus:     Anus within normal limits, no hemorrhoids present  Inguinal Lymph Nodes:     No lymphadenopathy present  Pubic Hair:     Normal pubic hair distribution for age  Genitalia and Groin:     No rashes present, no lesions present, no areas of discoloration, no masses present      COUNSELING:   Special attention given to:        Regular exercise       Healthy diet/nutrition       Family planning    BMI: /70   Pulse 68   Ht 1.645 m (5' 4.75\")   Wt 72.6 kg (160 lb)   LMP 11/10/2021   BMI 26.83 kg/m    Weight management plan: Discussed healthy diet and exercise guidelines    ASSESSMENT:  21 year old female with satisfactory annual exam.  No diagnosis found.    PLAN:  21-year-old female with a normal GYN exam.  We have encouraged her to contact her primary care provider who prescribes her sertraline for her and discussed weaning off.  We strongly encouraged her to not go off cold turkey.  We counseled her on starting prenatal vitamin with DHA and to call with a positive pregnancy test.    LEEANN Casarez CNP  "

## 2021-12-09 ENCOUNTER — MYC MEDICAL ADVICE (OUTPATIENT)
Dept: SURGERY | Facility: CLINIC | Age: 21
End: 2021-12-09
Payer: COMMERCIAL

## 2021-12-09 ASSESSMENT — PATIENT HEALTH QUESTIONNAIRE - PHQ9: SUM OF ALL RESPONSES TO PHQ QUESTIONS 1-9: 5

## 2021-12-09 ASSESSMENT — ANXIETY QUESTIONNAIRES: GAD7 TOTAL SCORE: 14

## 2021-12-13 ENCOUNTER — PRE VISIT (OUTPATIENT)
Dept: SURGERY | Facility: CLINIC | Age: 21
End: 2021-12-13

## 2022-01-18 ENCOUNTER — VIRTUAL VISIT (OUTPATIENT)
Dept: FAMILY MEDICINE | Facility: CLINIC | Age: 22
End: 2022-01-18
Payer: COMMERCIAL

## 2022-01-18 DIAGNOSIS — Z91.199 NO-SHOW FOR APPOINTMENT: Primary | ICD-10-CM

## 2022-01-18 NOTE — PROGRESS NOTES
I was unable to connect with the patient for this visit.    This patient was a no show for this scheduled appointment.

## 2022-01-25 ENCOUNTER — VIRTUAL VISIT (OUTPATIENT)
Dept: FAMILY MEDICINE | Facility: CLINIC | Age: 22
End: 2022-01-25
Payer: COMMERCIAL

## 2022-01-25 DIAGNOSIS — L70.0 ACNE VULGARIS: ICD-10-CM

## 2022-01-25 DIAGNOSIS — F41.9 ANXIETY: Primary | ICD-10-CM

## 2022-01-25 PROCEDURE — 99213 OFFICE O/P EST LOW 20 MIN: CPT | Mod: 95 | Performed by: FAMILY MEDICINE

## 2022-01-25 RX ORDER — SULFAMETHOXAZOLE/TRIMETHOPRIM 800-160 MG
TABLET ORAL
COMMUNITY
Start: 2022-01-08 | End: 2022-04-11

## 2022-01-25 RX ORDER — TRETINOIN 0.1 MG/G
GEL TOPICAL AT BEDTIME
Qty: 15 G | Refills: 5 | Status: SHIPPED | OUTPATIENT
Start: 2022-01-25 | End: 2022-12-13

## 2022-01-25 RX ORDER — SULFAMETHOXAZOLE/TRIMETHOPRIM 800-160 MG
TABLET ORAL
Status: CANCELLED | OUTPATIENT
Start: 2022-01-25

## 2022-01-25 RX ORDER — SERTRALINE HYDROCHLORIDE 100 MG/1
50 TABLET, FILM COATED ORAL DAILY
Qty: 45 TABLET | Refills: 3 | Status: SHIPPED | OUTPATIENT
Start: 2022-01-25 | End: 2022-05-26

## 2022-01-25 RX ORDER — SULFAMETHOXAZOLE/TRIMETHOPRIM 800-160 MG
1 TABLET ORAL DAILY
Qty: 30 TABLET | Refills: 5 | Status: SHIPPED | OUTPATIENT
Start: 2022-01-25 | End: 2022-03-01

## 2022-01-25 NOTE — PATIENT INSTRUCTIONS
Thanks for visiting with me today about anxiety and acne.     We had reviewed the following plan at the conclusion of today's visit.     -please decrease the dose of sertraline to 50mg (1/2 of a 100mg tablet) daily.  I would try to continue on that dose for the next month or two.  At that point, if the mood continues to be good, you could consider trying to cut back to 25mg daily for at least a couple of weeks before trying to stop taking it altogether.  I sent in refills for this medication to your pharmacy.     -please continue on your current acne regimen including tretinoin gel at bedtime and Bactrim DS  Once daily.  I sent in refills of these to your pharmacy.

## 2022-01-25 NOTE — PROGRESS NOTES
Cindy is a 21 year old who is being evaluated via a billable video visit.      How would you like to obtain your AVS? MyChart  If the video visit is dropped, the invitation should be resent by: [phone]  Will anyone else be joining your video visit? No    Video Start Time: 2:46 PM    Assessment & Plan     Cindy was seen today for recheck medication.    Diagnoses and all orders for this visit:    Anxiety  -     sertraline (ZOLOFT) 100 MG tablet; Take 0.5 tablets (50 mg) by mouth daily    Acne vulgaris  -     sulfamethoxazole-trimethoprim (BACTRIM DS) 800-160 MG tablet; Take 1 tablet by mouth daily for acne.  -     tretinoin (RETIN-A) 0.01 % external gel; Apply topically At Bedtime .  Apply thin layer and rub into clean, dry, acne-affected skin.    Patient Instructions   Thanks for visiting with me today about anxiety and acne.     We had reviewed the following plan at the conclusion of today's visit.     -please decrease the dose of sertraline to 50mg (1/2 of a 100mg tablet) daily.  I would try to continue on that dose for the next month or two.  At that point, if the mood continues to be good, you could consider trying to cut back to 25mg daily for at least a couple of weeks before trying to stop taking it altogether.  I sent in refills for this medication to your pharmacy.     -please continue on your current acne regimen including tretinoin gel at bedtime and Bactrim DS  Once daily.  I sent in refills of these to your pharmacy.      Return in about 6 months (around 7/25/2022) for Follow up.    24 minutes spent on the date of the encounter doing chart review, history and exam, documentation and further activities per the note.    Alexandre Foster MD  [facility]   Cindy is a 21 year old who presents for the following health issues     HPI   Follow-up acne vulgaris-patient reports that she is in need of refills for her chronically used acne medications.  She currently  is using tretinoin gel at bedtime as well as Bactrim DS 1 tablet once daily.  She has been on that regimen for at least the past year.  She notes no side effects from either of the treatments.  She feels that her acne is reasonably well controlled with vigilant use of these medications.      Follow-up generalized anxiety - currently treating with sertraline 100 mg daily.  She notes that mood has been quite good on this regimen, which she has taken for at least the last 4 months.  She had attempted to discontinue its use this past fall but found that mood had significantly worsened in short order.  She notes no significant side effects from this medication. She has found that she has fewer repetitive thoughts when taking the medication and over-thinks things lessShe requests instruction on tapering off of the medication, as she is considering this in the future.  She denies any thoughts or plans of harm to herself or others.      Objective       Vitals:  No vitals were obtained today due to virtual visit.    Physical Exam   Objective    General: alert/oriented to person/place. Answers questions appropriately.  Speech of normal rate and content.  Affect: pleasant, cooperative.   Facial skin: clear without significant acne noted.   Respiratory: no labored breathing noted.  No coughing during visit.      Video-Visit Details    Type of service:  Video Visit    Video End Time:3:00 PM    Originating Location (pt. Location): Home    Distant Location (provider location):  [facility]     Platform used for Video Visit: Shama Foster MD   Family medicine   [facility]

## 2022-04-11 ENCOUNTER — TELEPHONE (OUTPATIENT)
Dept: FAMILY MEDICINE | Facility: CLINIC | Age: 22
End: 2022-04-11

## 2022-04-11 ENCOUNTER — VIRTUAL VISIT (OUTPATIENT)
Dept: FAMILY MEDICINE | Facility: CLINIC | Age: 22
End: 2022-04-11
Payer: COMMERCIAL

## 2022-04-11 DIAGNOSIS — Z11.3 SCREENING FOR STDS (SEXUALLY TRANSMITTED DISEASES): ICD-10-CM

## 2022-04-11 DIAGNOSIS — Z12.4 CERVICAL CANCER SCREENING: ICD-10-CM

## 2022-04-11 DIAGNOSIS — Z11.59 NEED FOR HEPATITIS C SCREENING TEST: ICD-10-CM

## 2022-04-11 DIAGNOSIS — F32.1 CURRENT MODERATE EPISODE OF MAJOR DEPRESSIVE DISORDER WITHOUT PRIOR EPISODE (H): Primary | ICD-10-CM

## 2022-04-11 PROCEDURE — 99214 OFFICE O/P EST MOD 30 MIN: CPT | Mod: 95 | Performed by: FAMILY MEDICINE

## 2022-04-11 RX ORDER — BUPROPION HYDROCHLORIDE 150 MG/1
150 TABLET ORAL EVERY MORNING
Qty: 30 TABLET | Refills: 1 | Status: SHIPPED | OUTPATIENT
Start: 2022-04-11 | End: 2022-05-26

## 2022-04-11 ASSESSMENT — PATIENT HEALTH QUESTIONNAIRE - PHQ9
10. IF YOU CHECKED OFF ANY PROBLEMS, HOW DIFFICULT HAVE THESE PROBLEMS MADE IT FOR YOU TO DO YOUR WORK, TAKE CARE OF THINGS AT HOME, OR GET ALONG WITH OTHER PEOPLE: SOMEWHAT DIFFICULT
SUM OF ALL RESPONSES TO PHQ QUESTIONS 1-9: 13
SUM OF ALL RESPONSES TO PHQ QUESTIONS 1-9: 13
5. POOR APPETITE OR OVEREATING: SEVERAL DAYS

## 2022-04-11 ASSESSMENT — ANXIETY QUESTIONNAIRES
GAD7 TOTAL SCORE: 15
GAD7 TOTAL SCORE: 15
5. BEING SO RESTLESS THAT IT IS HARD TO SIT STILL: SEVERAL DAYS
GAD7 TOTAL SCORE: 15
IF YOU CHECKED OFF ANY PROBLEMS ON THIS QUESTIONNAIRE, HOW DIFFICULT HAVE THESE PROBLEMS MADE IT FOR YOU TO DO YOUR WORK, TAKE CARE OF THINGS AT HOME, OR GET ALONG WITH OTHER PEOPLE: VERY DIFFICULT
3. WORRYING TOO MUCH ABOUT DIFFERENT THINGS: NEARLY EVERY DAY
7. FEELING AFRAID AS IF SOMETHING AWFUL MIGHT HAPPEN: SEVERAL DAYS
2. NOT BEING ABLE TO STOP OR CONTROL WORRYING: NEARLY EVERY DAY
GAD7 TOTAL SCORE: 15
6. BECOMING EASILY ANNOYED OR IRRITABLE: NEARLY EVERY DAY
5. BEING SO RESTLESS THAT IT IS HARD TO SIT STILL: SEVERAL DAYS
4. TROUBLE RELAXING: NEARLY EVERY DAY
6. BECOMING EASILY ANNOYED OR IRRITABLE: NEARLY EVERY DAY
1. FEELING NERVOUS, ANXIOUS, OR ON EDGE: NEARLY EVERY DAY
2. NOT BEING ABLE TO STOP OR CONTROL WORRYING: MORE THAN HALF THE DAYS
7. FEELING AFRAID AS IF SOMETHING AWFUL MIGHT HAPPEN: SEVERAL DAYS
3. WORRYING TOO MUCH ABOUT DIFFERENT THINGS: NEARLY EVERY DAY
1. FEELING NERVOUS, ANXIOUS, OR ON EDGE: MORE THAN HALF THE DAYS
7. FEELING AFRAID AS IF SOMETHING AWFUL MIGHT HAPPEN: SEVERAL DAYS

## 2022-04-11 ASSESSMENT — ENCOUNTER SYMPTOMS
NERVOUS/ANXIOUS: 0
RESPIRATORY NEGATIVE: 1
NEUROLOGICAL NEGATIVE: 1
UNEXPECTED WEIGHT CHANGE: 1
DYSPHORIC MOOD: 0
CARDIOVASCULAR NEGATIVE: 1

## 2022-04-11 NOTE — PROGRESS NOTES
Cindy is a 22 year old who is being evaluated via a billable video visit.      How would you like to obtain your AVS? MyChart  If the video visit is dropped, the invitation should be resent by:  [phone]  Will anyone else be joining your video visit? No    Video Start Time: 2:47 PM    Assessment and Plan    (F32.1) Current moderate episode of major depressive disorder without prior episode (H)  (primary encounter diagnosis)  Comment: taper off of sertraline - 50mg x4d then stop, start wellbutrin at start of taoper  Plan: buPROPion (WELLBUTRIN XL) 150 MG 24 hr tablet        RTC in 1m for CPE    Kenneth Hong MD      Subjective   Cindy is a 22 year old who presents for the following health issues          Depression and Anxiety Follow-Up    How are you doing with your depression since your last visit?  It has been worse since last time. She talked to Dr. Styles and  lowered the dose of sertraline. She started to feel worse so she went back to taking full dose   She mostly is concerned about side effects, possibility of trying different medicine ?    How are you doing with your anxiety since your last visit?  Worsened     Are you having other symptoms that might be associated with depression or anxiety? Yes:  Feeling like a robot, cant cry, less sex drive, over eating    Have you had a significant life event? No     Do you have any concerns with your use of alcohol or other drugs? No    Social History     Tobacco Use     Smoking status: Never Smoker     Smokeless tobacco: Never Used   Vaping Use     Vaping Use: Never used   Substance Use Topics     Alcohol use: Yes     Comment: occasional     Drug use: No     PHQ 12/8/2021 4/11/2022 4/11/2022   PHQ-9 Total Score 5 13 13   Q9: Thoughts of better off dead/self-harm past 2 weeks Not at all Not at all Not at all     TRAVIS-7 SCORE 12/8/2021 4/11/2022 4/11/2022   Total Score - - 15 (severe anxiety)   Total Score 14 15 15     Last PHQ-9 4/11/2022   1.  Little interest or  pleasure in doing things 2   2.  Feeling down, depressed, or hopeless 0   3.  Trouble falling or staying asleep, or sleeping too much 3   4.  Feeling tired or having little energy 3   5.  Poor appetite or overeating 3   6.  Feeling bad about yourself 0   7.  Trouble concentrating 1   8.  Moving slowly or restless 1   Q9: Thoughts of better off dead/self-harm past 2 weeks 0   PHQ-9 Total Score 13   Difficulty at work, home, or with people Very difficult     TRAVIS-7  4/11/2022   1. Feeling nervous, anxious, or on edge 3   2. Not being able to stop or control worrying 3   3. Worrying too much about different things 3   4. Trouble relaxing 1   5. Being so restless that it is hard to sit still 1   6. Becoming easily annoyed or irritable 3   7. Feeling afraid, as if something awful might happen 1   TRAVIS-7 Total Score 15   If you checked any problems, how difficult have they made it for you to do your work, take care of things at home, or get along with other people? Very difficult     Mood had worsened on 50mg dose. Did increase her dose back to 100 mg about two weeks ago and things are much better.  Is bothered by side effects - eating more, gaining weight, lower sex drive.  Thinks she had these previously, but hadn't really noticed until she reduced her dose.  Now that she's back on the higher dose is seeing them again.    No other meds used for mood. No family history of mood disorder.    Normally goes to [facility].    Suicide Assessment Five-step Evaluation and Treatment (SAFE-T)        Review of Systems   Constitutional: Positive for unexpected weight change.   Respiratory: Negative.    Cardiovascular: Negative.    Genitourinary:        Low libido   Neurological: Negative.    Psychiatric/Behavioral: Positive for mood changes. Negative for dysphoric mood. The patient is not nervous/anxious.             Objective           Vitals:  No vitals were obtained today due to virtual visit.    Physical Exam   GENERAL:  Healthy, alert and no distress  EYES: Eyes grossly normal to inspection.  No discharge or erythema, or obvious scleral/conjunctival abnormalities.  RESP: No audible wheeze, cough, or visible cyanosis.  No visible retractions or increased work of breathing.    SKIN: Visible skin clear. No significant rash, abnormal pigmentation or lesions.  NEURO: Cranial nerves grossly intact.  Mentation and speech appropriate for age.  PSYCH: Mentation appears normal, affect normal/bright, judgement and insight intact, normal speech and appearance well-groomed.                Video-Visit Details    Type of service:  Video Visit    Video End Time:2:55 PM    Originating Location (pt. Location): Home    Distant Location (provider location):  [facility]     Platform used for Video Visit: [employer]

## 2022-04-12 ASSESSMENT — ANXIETY QUESTIONNAIRES: GAD7 TOTAL SCORE: 15

## 2022-05-26 ENCOUNTER — VIRTUAL VISIT (OUTPATIENT)
Dept: FAMILY MEDICINE | Facility: CLINIC | Age: 22
End: 2022-05-26
Payer: COMMERCIAL

## 2022-05-26 DIAGNOSIS — F32.1 CURRENT MODERATE EPISODE OF MAJOR DEPRESSIVE DISORDER WITHOUT PRIOR EPISODE (H): ICD-10-CM

## 2022-05-26 PROCEDURE — 99213 OFFICE O/P EST LOW 20 MIN: CPT | Mod: 95 | Performed by: FAMILY MEDICINE

## 2022-05-26 RX ORDER — BUPROPION HYDROCHLORIDE 150 MG/1
150 TABLET ORAL EVERY MORNING
Qty: 30 TABLET | Refills: 1 | Status: SHIPPED | OUTPATIENT
Start: 2022-05-26 | End: 2022-08-15

## 2022-05-26 ASSESSMENT — ENCOUNTER SYMPTOMS
CONSTITUTIONAL NEGATIVE: 1
DECREASED CONCENTRATION: 0
NEUROLOGICAL NEGATIVE: 1
HALLUCINATIONS: 1
NERVOUS/ANXIOUS: 1

## 2022-05-26 ASSESSMENT — PATIENT HEALTH QUESTIONNAIRE - PHQ9: SUM OF ALL RESPONSES TO PHQ QUESTIONS 1-9: 7

## 2022-05-26 NOTE — PROGRESS NOTES
"Cindy is a 22 year old who is being evaluated via a billable video visit.      How would you like to obtain your AVS? MyChart  If the video visit is dropped, the invitation should be resent by: Text to cell phone: [phone]  Will anyone else be joining your video visit? No      Video Start Time: 11:24 AM    Assessment and Plan    (F32.1) Current moderate episode of major depressive disorder without prior episode (H)  Comment: refill.  Hard to say of \"hallucinations\" are due to medication.  I suspect more likely to be due to being off of it.  Will refill, check back in 2w  Plan: buPROPion (WELLBUTRIN XL) 150 MG 24 hr tablet              RTC in 2w    Kenneth Hong MD      Subjective   Cindy is a 22 year old who presents for the following health issues     HPI     Medication Followup of Wellbutrin     Taking Medication as prescribed: yes    Side Effects:  She is feeling paranoid. Feeling like there is someone/ghost watching her around the house     Medication Helping Symptoms:  \"a little bit\"     Recently moved to [place].  Switched over to Welbutrin, had been out of it for about 2 weeks. Had been very anxious while off the medication.  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  Previously found medication helpful while using.  Noted that she did have a bit of anxiety compared to sertraline, but was able to manage.    Sleep has been OK.    Review of Systems   Constitutional: Negative.    Neurological: Negative.    Psychiatric/Behavioral: Positive for hallucinations. Negative for decreased concentration. The patient is nervous/anxious.             Objective           Vitals:  No vitals were obtained today due to virtual visit.    Physical Exam   GENERAL: Healthy, alert and no distress  EYES: Eyes grossly normal to inspection.  No discharge or erythema, or obvious scleral/conjunctival abnormalities.  RESP: No audible wheeze, cough, or visible " cyanosis.  No visible retractions or increased work of breathing.    SKIN: Visible skin clear. No significant rash, abnormal pigmentation or lesions.  NEURO: Cranial nerves grossly intact.  Mentation and speech appropriate for age.  PSYCH: Mentation appears normal, affect normal/bright, judgement and insight intact, normal speech and appearance well-groomed.                Video-Visit Details    Type of service:  Video Visit    Video End Time:11:33 AM    Originating Location (pt. Location): Home    Distant Location (provider location):  [facility]     Platform used for Video Visit: [employer]

## 2022-07-14 ENCOUNTER — VIRTUAL VISIT (OUTPATIENT)
Dept: FAMILY MEDICINE | Facility: CLINIC | Age: 22
End: 2022-07-14
Payer: COMMERCIAL

## 2022-07-14 DIAGNOSIS — E66.3 OVERWEIGHT (BMI 25.0-29.9): Primary | ICD-10-CM

## 2022-07-14 DIAGNOSIS — F33.1 MODERATE RECURRENT MAJOR DEPRESSION (H): ICD-10-CM

## 2022-07-14 PROBLEM — F32.9 MAJOR DEPRESSIVE DISORDER, SINGLE EPISODE: Status: RESOLVED | Noted: 2020-11-27 | Resolved: 2022-07-14

## 2022-07-14 PROCEDURE — 99213 OFFICE O/P EST LOW 20 MIN: CPT | Mod: 95 | Performed by: INTERNAL MEDICINE

## 2022-07-14 ASSESSMENT — ENCOUNTER SYMPTOMS
SLEEP DISTURBANCE: 0
FATIGUE: 0

## 2022-07-14 NOTE — PROGRESS NOTES
"Cindy is a 22 year old who is being evaluated via a billable video visit.      How would you like to obtain your AVS? MyChart  If the video visit is dropped, the invitation should be resent by: Text to cell phone: [phone]   Will anyone else be joining your video visit? No          Assessment & Plan     Moderate recurrent major depression (H)  Doing well    Overweight (BMI 25.0-29.9)  20 pound weight gain with a BMI of approximately 27 which began while on sertraline.  Weight gain since 2019  - Comprehensive Weight Management; Future             BMI:   Estimated body mass index is 26.83 kg/m  as calculated from the following:    Height as of 12/8/21: 1.645 m (5' 4.75\").    Weight as of 12/8/21: 72.6 kg (160 lb).   Weight management plan: Specific weight management program called 24 week healthy lifestyle plan discussed        No follow-ups on file.    Carlitos Vanessa MD  [facility]    Subjective   Cindy is a 22 year old, presenting for the following health issues:  No chief complaint on file.      HPI   Patient has gained 20 pounds since 2019 when she was started on sertraline for depression.  Depression is been well controlled she would like to lose the weight.  She cooks for herself and her .        Review of Systems   Constitutional: Negative for fatigue.   Psychiatric/Behavioral: Negative for mood changes and sleep disturbance.            Objective           Vitals:  No vitals were obtained today due to virtual visit.    Physical Exam   GENERAL: Healthy, alert and no distress  EYES: Eyes grossly normal to inspection.  No discharge or erythema, or obvious scleral/conjunctival abnormalities.  RESP: No audible wheeze, cough, or visible cyanosis.  No visible retractions or increased work of breathing.    SKIN: Visible skin clear. No significant rash, abnormal pigmentation or lesions.  NEURO: Cranial nerves grossly intact.  Mentation and speech appropriate for age.  PSYCH: " Mentation appears normal, affect normal/bright, judgement and insight intact, normal speech and appearance well-groomed.                Video-Visit Details    Video Start Time: 3:50    Type of service:  Video Visit    Video End Time:4:05 PM    Originating Location (pt. Location): Home    Distant Location (provider location):  [facility]     Platform used for Video Visit: Shama Murphy.

## 2022-08-15 DIAGNOSIS — F32.1 CURRENT MODERATE EPISODE OF MAJOR DEPRESSIVE DISORDER WITHOUT PRIOR EPISODE (H): ICD-10-CM

## 2022-08-15 RX ORDER — BUPROPION HYDROCHLORIDE 150 MG/1
TABLET ORAL
Qty: 30 TABLET | Refills: 1 | Status: SHIPPED | OUTPATIENT
Start: 2022-08-15 | End: 2022-12-13

## 2022-08-15 NOTE — TELEPHONE ENCOUNTER
Routing refill request to provider for review/approval because:  PHQ > 4     Routing to station to assist in scheduling.   Return in about 2 weeks (around 6/9/2022) for Virtual (video prefered).      Kelly MONTOYA RN

## 2022-12-13 ENCOUNTER — OFFICE VISIT (OUTPATIENT)
Dept: FAMILY MEDICINE | Facility: CLINIC | Age: 22
End: 2022-12-13
Payer: COMMERCIAL

## 2022-12-13 VITALS
TEMPERATURE: 99.6 F | SYSTOLIC BLOOD PRESSURE: 128 MMHG | OXYGEN SATURATION: 99 % | RESPIRATION RATE: 16 BRPM | BODY MASS INDEX: 29.14 KG/M2 | HEIGHT: 64 IN | HEART RATE: 102 BPM | WEIGHT: 170.69 LBS | DIASTOLIC BLOOD PRESSURE: 80 MMHG

## 2022-12-13 DIAGNOSIS — Z11.59 NEED FOR HEPATITIS C SCREENING TEST: ICD-10-CM

## 2022-12-13 DIAGNOSIS — Z23 NEED FOR VACCINATION: ICD-10-CM

## 2022-12-13 DIAGNOSIS — Z00.00 ROUTINE GENERAL MEDICAL EXAMINATION AT A HEALTH CARE FACILITY: Primary | ICD-10-CM

## 2022-12-13 DIAGNOSIS — L70.0 ACNE VULGARIS: ICD-10-CM

## 2022-12-13 LAB
BASOPHILS # BLD AUTO: 0 10E3/UL (ref 0–0.2)
BASOPHILS NFR BLD AUTO: 0 %
EOSINOPHIL # BLD AUTO: 0.1 10E3/UL (ref 0–0.7)
EOSINOPHIL NFR BLD AUTO: 1 %
ERYTHROCYTE [DISTWIDTH] IN BLOOD BY AUTOMATED COUNT: 12 % (ref 10–15)
HCT VFR BLD AUTO: 36.7 % (ref 35–47)
HGB BLD-MCNC: 12.7 G/DL (ref 11.7–15.7)
LYMPHOCYTES # BLD AUTO: 1.5 10E3/UL (ref 0.8–5.3)
LYMPHOCYTES NFR BLD AUTO: 18 %
MCH RBC QN AUTO: 29.9 PG (ref 26.5–33)
MCHC RBC AUTO-ENTMCNC: 34.6 G/DL (ref 31.5–36.5)
MCV RBC AUTO: 86 FL (ref 78–100)
MONOCYTES # BLD AUTO: 0.7 10E3/UL (ref 0–1.3)
MONOCYTES NFR BLD AUTO: 8 %
NEUTROPHILS # BLD AUTO: 6.4 10E3/UL (ref 1.6–8.3)
NEUTROPHILS NFR BLD AUTO: 74 %
PLATELET # BLD AUTO: 268 10E3/UL (ref 150–450)
RBC # BLD AUTO: 4.25 10E6/UL (ref 3.8–5.2)
WBC # BLD AUTO: 8.6 10E3/UL (ref 4–11)

## 2022-12-13 PROCEDURE — 36415 COLL VENOUS BLD VENIPUNCTURE: CPT | Performed by: PHYSICIAN ASSISTANT

## 2022-12-13 PROCEDURE — 90686 IIV4 VACC NO PRSV 0.5 ML IM: CPT | Performed by: PHYSICIAN ASSISTANT

## 2022-12-13 PROCEDURE — 99213 OFFICE O/P EST LOW 20 MIN: CPT | Mod: 25 | Performed by: PHYSICIAN ASSISTANT

## 2022-12-13 PROCEDURE — 99395 PREV VISIT EST AGE 18-39: CPT | Mod: 25 | Performed by: PHYSICIAN ASSISTANT

## 2022-12-13 PROCEDURE — 90471 IMMUNIZATION ADMIN: CPT | Performed by: PHYSICIAN ASSISTANT

## 2022-12-13 PROCEDURE — 86803 HEPATITIS C AB TEST: CPT | Performed by: PHYSICIAN ASSISTANT

## 2022-12-13 PROCEDURE — 80050 GENERAL HEALTH PANEL: CPT | Performed by: PHYSICIAN ASSISTANT

## 2022-12-13 RX ORDER — CLINDAMYCIN PHOSPHATE 10 UG/ML
LOTION TOPICAL 2 TIMES DAILY
Qty: 60 ML | Refills: 0 | Status: SHIPPED | OUTPATIENT
Start: 2022-12-13 | End: 2023-06-06

## 2022-12-13 RX ORDER — SULFAMETHOXAZOLE/TRIMETHOPRIM 800-160 MG
1 TABLET ORAL DAILY
Qty: 30 TABLET | Refills: 5 | Status: CANCELLED | OUTPATIENT
Start: 2022-12-13

## 2022-12-13 ASSESSMENT — ENCOUNTER SYMPTOMS
BREAST MASS: 0
SHORTNESS OF BREATH: 0
ARTHRALGIAS: 0
FEVER: 0
DIZZINESS: 0
DYSURIA: 0
PALPITATIONS: 0
JOINT SWELLING: 0
CONSTIPATION: 0
ABDOMINAL PAIN: 0
HEMATOCHEZIA: 0
MYALGIAS: 0
HEARTBURN: 0
EYE PAIN: 0
NERVOUS/ANXIOUS: 0
PARESTHESIAS: 0
HEMATURIA: 0
FREQUENCY: 0
COUGH: 0
SORE THROAT: 0
CHILLS: 0
WEAKNESS: 0
HEADACHES: 0
DIARRHEA: 0
NAUSEA: 0

## 2022-12-13 ASSESSMENT — PAIN SCALES - GENERAL: PAINLEVEL: MILD PAIN (2)

## 2022-12-13 ASSESSMENT — PATIENT HEALTH QUESTIONNAIRE - PHQ9
SUM OF ALL RESPONSES TO PHQ QUESTIONS 1-9: 3
SUM OF ALL RESPONSES TO PHQ QUESTIONS 1-9: 3

## 2022-12-13 NOTE — PROGRESS NOTES
SUBJECTIVE:   CC: Cindy is an 22 year old who presents for preventive health visit.   Patient has been advised of split billing requirements and indicates understanding: Yes  Healthy Habits:     Getting at least 3 servings of Calcium per day:  Yes    Bi-annual eye exam:  Yes    Dental care twice a year:  Yes    Sleep apnea or symptoms of sleep apnea:  None    Diet:  Regular (no restrictions)    Frequency of exercise:  None    Taking medications regularly:  Yes    Medication side effects:  None    PHQ-2 Total Score: 2    Additional concerns today:  No    21 y/o new to me female here to get established.  She has followed with women's health last year.  She is planning on following up with them.  Has been trying to get pregnant just for a couple of months.  Wonders about endometriosis or PCOS.    Has been on bactrim for acne, wonders about refill.  Sounds like she tried retin a without much success.      Mom did have liver failure and subsequent transplant secondary to hep C.  She has never been screened.          Today's PHQ-2 Score:   PHQ-2 ( 1999 [employer]) 12/13/2022   Q1: Little interest or pleasure in doing things 1   Q2: Feeling down, depressed or hopeless 1   PHQ-2 Score 2   PHQ-2 Total Score (12-17 Years)- Positive if 3 or more points; Administer PHQ-A if positive -   Q1: Little interest or pleasure in doing things Several days   Q2: Feeling down, depressed or hopeless Several days   PHQ-2 Score 2       Have you ever done Advance Care Planning? (For example, a Health Directive, POLST, or a discussion with a medical provider or your loved ones about your wishes): No, advance care planning information given to patient to review.  Patient plans to discuss their wishes with loved ones or provider.      Social History     Tobacco Use     Smoking status: Never     Smokeless tobacco: Never   Substance Use Topics     Alcohol use: Not Currently     Comment: occasional     If you drink alcohol do you typically have >3  "drinks per day or >7 drinks per week? No    Alcohol Use 12/13/2022   Prescreen: >3 drinks/day or >7 drinks/week? No   Prescreen: >3 drinks/day or >7 drinks/week? -   No flowsheet data found.    Reviewed orders with patient.  Reviewed health maintenance and updated orders accordingly - Yes  BP Readings from Last 3 Encounters:   12/13/22 128/80   12/08/21 116/70   11/16/21 128/84    Wt Readings from Last 3 Encounters:   12/13/22 77.4 kg (170 lb 11 oz)   12/08/21 72.6 kg (160 lb)   11/16/21 71.7 kg (158 lb)                    Breast Cancer Screening:        History of abnormal Pap smear: NO - age 21-29 PAP every 3 years recommended  PAP / HPV 3/22/2019   PAP (Historical) NIL     Reviewed and updated as needed this visit by clinical staff   Tobacco  Allergies  Meds              Reviewed and updated as needed this visit by Provider                     Review of Systems   Constitutional: Negative for chills and fever.   HENT: Negative for congestion, ear pain, hearing loss and sore throat.    Eyes: Negative for pain and visual disturbance.   Respiratory: Negative for cough and shortness of breath.    Cardiovascular: Negative for chest pain, palpitations and peripheral edema.   Gastrointestinal: Negative for abdominal pain, constipation, diarrhea, heartburn, hematochezia and nausea.   Breasts:  Negative for tenderness, breast mass and discharge.   Genitourinary: Negative for dysuria, frequency, genital sores, hematuria, pelvic pain, urgency, vaginal bleeding and vaginal discharge.   Musculoskeletal: Negative for arthralgias, joint swelling and myalgias.   Skin: Negative for rash.   Neurological: Negative for dizziness, weakness, headaches and paresthesias.   Psychiatric/Behavioral: Negative for mood changes. The patient is not nervous/anxious.           OBJECTIVE:   /80   Pulse 102   Temp 99.6  F (37.6  C) (Temporal)   Resp 16   Ht 1.613 m (5' 3.5\")   Wt 77.4 kg (170 lb 11 oz)   LMP 12/05/2022   SpO2 99%   " BMI 29.76 kg/m    Physical Exam  GENERAL: alert and no distress  EYES: Eyes grossly normal to inspection  HENT: normal cephalic/atraumatic and ear canals and TM's normal  NECK: no adenopathy, no asymmetry, masses, or scars and thyroid normal to palpation  RESP: lungs clear to auscultation - no rales, rhonchi or wheezes  CV: regular rate and rhythm, normal S1 S2, no S3 or S4, no murmur, click or rub, no peripheral edema and peripheral pulses strong  ABDOMEN: soft, nontender, no hepatosplenomegaly, no masses and bowel sounds normal  MS: no gross musculoskeletal defects noted, no edema  SKIN: no suspicious lesions or rashes  NEURO: Normal strength and tone, mentation intact and speech normal  PSYCH: mentation appears normal, affect normal/bright    Diagnostic Test Results:  Labs reviewed in Epic    ASSESSMENT/PLAN:   (Z00.00) Routine general medical examination at a health care facility  (primary encounter diagnosis)  Comment: we discussed updating cervical cancer screening, and since she plans to follow up with OB/GYN, she decided to get that with them  Plan: CBC with platelets and differential,         Comprehensive metabolic panel (BMP + Alb, Alk         Phos, ALT, AST, Total. Bili, TP), TSH with free        T4 reflex            (Z11.59) Need for hepatitis C screening test  Comment:   Plan: Hepatitis C Screen Reflex to HCV RNA Quant and         Genotype            (L70.0) Acne vulgaris  Comment: discussed that bactrim is generally not recommended during pregnancy, so will trial topical  Plan: clindamycin (CLEOCIN T) 1 % external lotion            (Z23) Need for vaccination  Comment:   Plan: INFLUENZA VACCINE IM > 6 MONTHS VALENT IIV4         (AFLURIA/FLUZONE)                  COUNSELING:  Reviewed preventive health counseling, as reflected in patient instructions       Regular exercise       Healthy diet/nutrition       Immunizations    Vaccinated for: Influenza             Family planning        She reports that  she has never smoked. She has never used smokeless tobacco.      Carlitos rKishna PA-C  [facility]  Answers for HPI/ROS submitted by the patient on 12/13/2022  PHQ9 TOTAL SCORE: 3

## 2022-12-13 NOTE — NURSING NOTE
Prior to immunization administration, verified patients identity using patient s name and date of birth. Please see Immunization Activity for additional information.     Screening Questionnaire for Adult Immunization    Are you sick today?   No   Do you have allergies to medications, food, a vaccine component or latex?   No   Have you ever had a serious reaction after receiving a vaccination?   No   Do you have a long-term health problem with heart, lung, kidney, or metabolic disease (e.g., diabetes), asthma, a blood disorder, no spleen, complement component deficiency, a cochlear implant, or a spinal fluid leak?  Are you on long-term aspirin therapy?   No   Do you have cancer, leukemia, HIV/AIDS, or any other immune system problem?   No   Do you have a parent, brother, or sister with an immune system problem?   No   In the past 3 months, have you taken medications that affect  your immune system, such as prednisone, other steroids, or anticancer drugs; drugs for the treatment of rheumatoid arthritis, Crohn s disease, or psoriasis; or have you had radiation treatments?   No   Have you had a seizure, or a brain or other nervous system problem?   No   During the past year, have you received a transfusion of blood or blood    products, or been given immune (gamma) globulin or antiviral drug?   No   For women: Are you pregnant or is there a chance you could become       pregnant during the next month?   No   Have you received any vaccinations in the past 4 weeks?   No     Immunization questionnaire answers were all negative.        Per orders of Dr. Krishna, injection of Flu given by Areli Penny RN. Patient instructed to remain in clinic for 15 minutes afterwards, and to report any adverse reaction to me immediately.       Screening performed by Areli Penny RN on 12/13/2022 at 4:28 PM.

## 2022-12-14 LAB
ALBUMIN SERPL BCG-MCNC: 4.6 G/DL (ref 3.5–5.2)
ALP SERPL-CCNC: 58 U/L (ref 35–104)
ALT SERPL W P-5'-P-CCNC: 14 U/L (ref 10–35)
ANION GAP SERPL CALCULATED.3IONS-SCNC: 11 MMOL/L (ref 7–15)
AST SERPL W P-5'-P-CCNC: 26 U/L (ref 10–35)
BILIRUB SERPL-MCNC: 0.5 MG/DL
BUN SERPL-MCNC: 19.4 MG/DL (ref 6–20)
CALCIUM SERPL-MCNC: 8.8 MG/DL (ref 8.6–10)
CHLORIDE SERPL-SCNC: 104 MMOL/L (ref 98–107)
CREAT SERPL-MCNC: 0.75 MG/DL (ref 0.51–0.95)
DEPRECATED HCO3 PLAS-SCNC: 25 MMOL/L (ref 22–29)
GFR SERPL CREATININE-BSD FRML MDRD: >90 ML/MIN/1.73M2
GLUCOSE SERPL-MCNC: 95 MG/DL (ref 70–99)
HCV AB SERPL QL IA: NONREACTIVE
POTASSIUM SERPL-SCNC: 3.9 MMOL/L (ref 3.4–5.3)
PROT SERPL-MCNC: 7.4 G/DL (ref 6.4–8.3)
SODIUM SERPL-SCNC: 140 MMOL/L (ref 136–145)
TSH SERPL DL<=0.005 MIU/L-ACNC: 1.1 UIU/ML (ref 0.3–4.2)

## 2022-12-14 NOTE — RESULT ENCOUNTER NOTE
Dear Cindy    Everything looks great on your labs.  Your Hepatitis C screen in negative, so no sign of that.  Let me know if you have any follow up questions or concerns.    Marc Krishna PA-C

## 2022-12-20 ENCOUNTER — TELEPHONE (OUTPATIENT)
Dept: PEDIATRICS | Facility: CLINIC | Age: 22
End: 2022-12-20

## 2022-12-20 ENCOUNTER — OFFICE VISIT (OUTPATIENT)
Dept: PEDIATRICS | Facility: CLINIC | Age: 22
End: 2022-12-20
Payer: COMMERCIAL

## 2022-12-20 VITALS
BODY MASS INDEX: 29.5 KG/M2 | WEIGHT: 172.8 LBS | OXYGEN SATURATION: 100 % | RESPIRATION RATE: 16 BRPM | SYSTOLIC BLOOD PRESSURE: 126 MMHG | HEART RATE: 88 BPM | TEMPERATURE: 97.4 F | DIASTOLIC BLOOD PRESSURE: 82 MMHG | HEIGHT: 64 IN

## 2022-12-20 DIAGNOSIS — F10.10 NONDEPENDENT ALCOHOL ABUSE, EPISODIC DRINKING BEHAVIOR: ICD-10-CM

## 2022-12-20 DIAGNOSIS — F41.1 GENERALIZED ANXIETY DISORDER: Primary | ICD-10-CM

## 2022-12-20 PROCEDURE — 99214 OFFICE O/P EST MOD 30 MIN: CPT | Performed by: INTERNAL MEDICINE

## 2022-12-20 RX ORDER — VENLAFAXINE HYDROCHLORIDE 37.5 MG/1
37.5 TABLET, EXTENDED RELEASE ORAL DAILY
Qty: 30 TABLET | Refills: 1 | Status: SHIPPED | OUTPATIENT
Start: 2022-12-20 | End: 2023-06-06

## 2022-12-20 ASSESSMENT — ANXIETY QUESTIONNAIRES
6. BECOMING EASILY ANNOYED OR IRRITABLE: NEARLY EVERY DAY
8. IF YOU CHECKED OFF ANY PROBLEMS, HOW DIFFICULT HAVE THESE MADE IT FOR YOU TO DO YOUR WORK, TAKE CARE OF THINGS AT HOME, OR GET ALONG WITH OTHER PEOPLE?: EXTREMELY DIFFICULT
GAD7 TOTAL SCORE: 14
GAD7 TOTAL SCORE: 14
2. NOT BEING ABLE TO STOP OR CONTROL WORRYING: NEARLY EVERY DAY
3. WORRYING TOO MUCH ABOUT DIFFERENT THINGS: NEARLY EVERY DAY
GAD7 TOTAL SCORE: 14
7. FEELING AFRAID AS IF SOMETHING AWFUL MIGHT HAPPEN: SEVERAL DAYS
7. FEELING AFRAID AS IF SOMETHING AWFUL MIGHT HAPPEN: SEVERAL DAYS
4. TROUBLE RELAXING: MORE THAN HALF THE DAYS
IF YOU CHECKED OFF ANY PROBLEMS ON THIS QUESTIONNAIRE, HOW DIFFICULT HAVE THESE PROBLEMS MADE IT FOR YOU TO DO YOUR WORK, TAKE CARE OF THINGS AT HOME, OR GET ALONG WITH OTHER PEOPLE: EXTREMELY DIFFICULT
5. BEING SO RESTLESS THAT IT IS HARD TO SIT STILL: SEVERAL DAYS
1. FEELING NERVOUS, ANXIOUS, OR ON EDGE: SEVERAL DAYS

## 2022-12-20 ASSESSMENT — PAIN SCALES - GENERAL: PAINLEVEL: NO PAIN (0)

## 2022-12-20 ASSESSMENT — PATIENT HEALTH QUESTIONNAIRE - PHQ9
SUM OF ALL RESPONSES TO PHQ QUESTIONS 1-9: 7
10. IF YOU CHECKED OFF ANY PROBLEMS, HOW DIFFICULT HAVE THESE PROBLEMS MADE IT FOR YOU TO DO YOUR WORK, TAKE CARE OF THINGS AT HOME, OR GET ALONG WITH OTHER PEOPLE: VERY DIFFICULT
SUM OF ALL RESPONSES TO PHQ QUESTIONS 1-9: 7

## 2022-12-20 NOTE — PATIENT INSTRUCTIONS
"Call for counseling:  Our counselor at [facility] is Marc Barros, and the main number for [facility] counseling is [phone] .  If he is not available, you can set up an appointment with another [facility] counselor that is.     You can also choose to followup with [facility] at [phone]     Also consult the counseling services through [employer] at your or your 's job.     the book \"Full Catastrophe Living\", by Tha Tidwell, and begin the course described therein.    Let's begin on effexor 37.5 mg daily   Set up a follow up with your primary care provider in 1-2 months.  Call us if you get any side effects.    No more alcohol.      Jesus Valerio MD  Internal Medicine and Pediatrics           "

## 2022-12-20 NOTE — TELEPHONE ENCOUNTER
PRIOR AUTHORIZATION DENIED    Medication: venlafaxine (EFFEXOR-ER) 37.5 MG 24 hr tablet - EPA DENIED    Denial Date: 12/20/2022    Denial Rational:       Appeal Information:

## 2022-12-20 NOTE — PROGRESS NOTES
"  Assessment & Plan     Generalized anxiety disorder    Counselled extensively on risks of suicidality.  Contracts for safety.   Some unhealthy self-medicating with alcohol.  Now stopped.  Begin serotonin / norepinephrine reuptake inhibitor and counseling; self-help book recommended.     Patient Instructions   Call for counseling:  Our counselor at [facility] is Marc Barros, and the main number for [facility] counseling is [phone].  If he is not available, you can set up an appointment with another [facility] counselor that is.     You can also choose to followup with [facility] at [phone]     Also consult the counseling services through [employer] at your or your 's job.     the book \"Full Catastrophe Living\", by Tha Tidwell, and begin the course described therein.    Let's begin on effexor 37.5 mg daily   Set up a follow up with your primary care provider in 1-2 months.  Call us if you get any side effects.    No more alcohol.      Jesus Valerio MD  Internal Medicine and Pediatrics              - Adult Mental Health  Referral; Future  - venlafaxine (EFFEXOR-ER) 37.5 MG 24 hr tablet; Take 1 tablet (37.5 mg) by mouth daily    Nondependent alcohol abuse, episodic drinking behavior                 See Patient Instructions    Return in about 2 months (around 2/20/2023) for medicine followup, with primary provider, in person.    Jesus Valerio MD  [facility] NOBLE White is a 22 year old, presenting for the following health issues:  Mental Health Problem      Mental Health Problem    History of Present Illness       Reason for visit:  Mental health evaluation  Symptom onset:  More than a month  Symptoms include:  Cant complete daily life tasks  Symptom intensity:  Severe  Symptom progression:  Worsening  Had these symptoms before:  Yes  Has tried/received treatment for these symptoms:  Yes  Previous treatment was successful:  No  What makes it worse:  Sitting " "still and over thinking  What makes it better:  Being active    She eats 0-1 servings of fruits and vegetables daily.She consumes 0 sweetened beverage(s) daily.She exercises with enough effort to increase her heart rate 20 to 29 minutes per day.  She exercises with enough effort to increase her heart rate 3 or less days per week.   She is taking medications regularly.  Today's TRAVIS-7 Score: 14       Depression and Anxiety Follow-Up    How are you doing with your depression since your last visit? Worsened     How are you doing with your anxiety since your last visit?  Worsened     Are you having other symptoms that might be associated with depression or anxiety? Yes:  sleeping issues eating issues     Have you had a significant life event? OTHER: just moved, holiday, Trying to get pregnant     Do you have any concerns with your use of alcohol or other drugs? No    Feels like her mental health is more strained.  Recently moved back to [place] from [place].    Has issues of constant worry \"all at once\".  Cannot focus on things.  New job, but seems to overthink things too much.      Some panic attacks about once every 3 months.  At other times, is just constantly worried.  Feels like can't focus or \"do things.\"      Has been a problem for last 12 months or so.  Had been on bupropion but came off in July due to \"feeling numb.\"  Had been on sertraline before that.    Has seen counselor last year, not recently.     No suicidal ideation.  No drugs but does occasionally drink to calm herself down.  Did drink nightly, and daily about 1 liter of vodka, but stopped 1 week ago.      Social History     Tobacco Use     Smoking status: Never     Smokeless tobacco: Never   Vaping Use     Vaping Use: Never used   Substance Use Topics     Alcohol use: Not Currently     Comment: occasional     Drug use: No     PHQ 5/26/2022 12/13/2022 12/20/2022   PHQ-9 Total Score 7 3 7   Q9: Thoughts of better off dead/self-harm past 2 weeks Not at all Not at " all Not at all     TRAVIS-7 SCORE 4/11/2022 4/11/2022 12/20/2022   Total Score - 15 (severe anxiety) 14 (moderate anxiety)   Total Score 15 15 14     Last PHQ-9 12/20/2022   1.  Little interest or pleasure in doing things 1   2.  Feeling down, depressed, or hopeless 0   3.  Trouble falling or staying asleep, or sleeping too much 1   4.  Feeling tired or having little energy 1   5.  Poor appetite or overeating 1   6.  Feeling bad about yourself 1   7.  Trouble concentrating 2   8.  Moving slowly or restless 0   Q9: Thoughts of better off dead/self-harm past 2 weeks 0   PHQ-9 Total Score 7   Difficulty at work, home, or with people -     TRAVIS-7  12/20/2022   1. Feeling nervous, anxious, or on edge 1   2. Not being able to stop or control worrying 3   3. Worrying too much about different things 3   4. Trouble relaxing 2   5. Being so restless that it is hard to sit still 1   6. Becoming easily annoyed or irritable 3   7. Feeling afraid, as if something awful might happen 1   TRAVIS-7 Total Score 14   If you checked any problems, how difficult have they made it for you to do your work, take care of things at home, or get along with other people? Extremely difficult       Suicide Assessment Five-step Evaluation and Treatment (SAFE-T)        Review of Systems   CONSTITUTIONAL: NEGATIVE for fever, chills, change in weight  INTEGUMENTARY/SKIN: NEGATIVE for worrisome rashes, moles or lesions  EYES: NEGATIVE for vision changes or irritation  ENT/MOUTH: NEGATIVE for ear, mouth and throat problems  RESP: NEGATIVE for significant cough or SOB  BREAST: NEGATIVE for masses, tenderness or discharge  CV: NEGATIVE for chest pain, palpitations or peripheral edema  GI: NEGATIVE for nausea, abdominal pain, heartburn, or change in bowel habits  : NEGATIVE for frequency, dysuria, or hematuria  MUSCULOSKELETAL: NEGATIVE for significant arthralgias or myalgia  NEURO: NEGATIVE for weakness, dizziness or paresthesias  ENDOCRINE: NEGATIVE for  "temperature intolerance, skin/hair changes  HEME: NEGATIVE for bleeding problems  PSYCHIATRIC: NEGATIVE for changes in mood or affect      Objective    /82   Pulse 88   Temp 97.4  F (36.3  C) (Tympanic)   Resp 16   Ht 1.613 m (5' 3.5\")   Wt 78.4 kg (172 lb 12.8 oz)   LMP 12/05/2022   SpO2 100%   BMI 30.13 kg/m    Body mass index is 30.13 kg/m .  Physical Exam   GENERAL: healthy, alert and no distress  EYES: Eyes grossly normal to inspection, PERRL and conjunctivae and sclerae normal  HENT: ear canals and TM's normal, nose and mouth without ulcers or lesions  NECK: no adenopathy, no asymmetry, masses, or scars and thyroid normal to palpation  RESP: lungs clear to auscultation - no rales, rhonchi or wheezes  CV: regular rate and rhythm, normal S1 S2, no S3 or S4, no murmur, click or rub, no peripheral edema and peripheral pulses strong  ABDOMEN: soft, nontender, no hepatosplenomegaly, no masses and bowel sounds normal  MS: no gross musculoskeletal defects noted, no edema  SKIN: no suspicious lesions or rashes  NEURO: Normal strength and tone, mentation intact and speech normal  PSYCH: mentation appears normal, affect normal/bright                    "

## 2022-12-20 NOTE — PROGRESS NOTES
"  {PROVIDER CHARTING PREFERENCE:832547}    Elton White is a 22 year old, presenting for the following health issues:  Mental Health Problem      Mental Health Problem    History of Present Illness       Reason for visit:  Mental health evaluation  Symptom onset:  More than a month  Symptoms include:  Cant complete daily life tasks  Symptom intensity:  Severe  Symptom progression:  Worsening  Had these symptoms before:  Yes  Has tried/received treatment for these symptoms:  Yes  Previous treatment was successful:  No  What makes it worse:  Sitting still and over thinking  What makes it better:  Being active    She eats 0-1 servings of fruits and vegetables daily.She consumes 0 sweetened beverage(s) daily.She exercises with enough effort to increase her heart rate 20 to 29 minutes per day.  She exercises with enough effort to increase her heart rate 3 or less days per week.   She is taking medications regularly.       Depression Followup    How are you doing with your depression since your last visit? { :512570::\"No change\"}    Are you having other symptoms that might be associated with depression? { :182480}    Have you had a significant life event?  { :895285}     Are you feeling anxious or having panic attacks?   { :299969}    Do you have any concerns with your use of alcohol or other drugs? { :982796}    Social History     Tobacco Use     Smoking status: Never     Smokeless tobacco: Never   Vaping Use     Vaping Use: Never used   Substance Use Topics     Alcohol use: Not Currently     Comment: occasional     Drug use: No     PHQ 4/11/2022 5/26/2022 12/13/2022   PHQ-9 Total Score 13 7 3   Q9: Thoughts of better off dead/self-harm past 2 weeks Not at all Not at all Not at all     TRAVIS-7 SCORE 12/8/2021 4/11/2022 4/11/2022   Total Score - - 15 (severe anxiety)   Total Score 14 15 15     Last PHQ-9 12/13/2022   1.  Little interest or pleasure in doing things 0   2.  Feeling down, depressed, or hopeless 0   3.  " Trouble falling or staying asleep, or sleeping too much 0   4.  Feeling tired or having little energy 1   5.  Poor appetite or overeating 1   6.  Feeling bad about yourself 0   7.  Trouble concentrating 1   8.  Moving slowly or restless 0   Q9: Thoughts of better off dead/self-harm past 2 weeks 0   PHQ-9 Total Score 3   Difficulty at work, home, or with people -     TRAVIS-7  4/11/2022   1. Feeling nervous, anxious, or on edge 3   2. Not being able to stop or control worrying 3   3. Worrying too much about different things 3   4. Trouble relaxing 1   5. Being so restless that it is hard to sit still 1   6. Becoming easily annoyed or irritable 3   7. Feeling afraid, as if something awful might happen 1   TRAVIS-7 Total Score 15   If you checked any problems, how difficult have they made it for you to do your work, take care of things at home, or get along with other people? Very difficult       Suicide Assessment Five-step Evaluation and Treatment (SAFE-T)  {Provider  Link to Depression Care Package SmartSet :305448}  {additonal problems for provider to add (Optional):917161}    Review of Systems   {ROS COMP (Optional):527284}      Objective    LMP 12/05/2022   There is no height or weight on file to calculate BMI.  Physical Exam   {Exam List (Optional):637694}    {Diagnostic Test Results (Optional):958138}    {AMBULATORY ATTESTATION (Optional):989214}

## 2022-12-27 ENCOUNTER — OFFICE VISIT (OUTPATIENT)
Dept: OBGYN | Facility: CLINIC | Age: 22
End: 2022-12-27
Payer: COMMERCIAL

## 2022-12-27 VITALS
OXYGEN SATURATION: 100 % | DIASTOLIC BLOOD PRESSURE: 82 MMHG | SYSTOLIC BLOOD PRESSURE: 128 MMHG | BODY MASS INDEX: 29.53 KG/M2 | WEIGHT: 173 LBS | HEIGHT: 64 IN | HEART RATE: 86 BPM

## 2022-12-27 DIAGNOSIS — Z12.4 CERVICAL CANCER SCREENING: ICD-10-CM

## 2022-12-27 DIAGNOSIS — Z31.41 FERTILITY TESTING: Primary | ICD-10-CM

## 2022-12-27 DIAGNOSIS — Z11.3 ROUTINE SCREENING FOR STI (SEXUALLY TRANSMITTED INFECTION): ICD-10-CM

## 2022-12-27 LAB
MIS SERPL-MCNC: 3.51 NG/ML (ref 1.2–12)
PROGEST SERPL-MCNC: 1.2 NG/ML
TSH SERPL DL<=0.005 MIU/L-ACNC: 0.59 UIU/ML (ref 0.3–4.2)

## 2022-12-27 PROCEDURE — G0145 SCR C/V CYTO,THINLAYER,RESCR: HCPCS | Performed by: OBSTETRICS & GYNECOLOGY

## 2022-12-27 PROCEDURE — 83520 IMMUNOASSAY QUANT NOS NONAB: CPT | Performed by: OBSTETRICS & GYNECOLOGY

## 2022-12-27 PROCEDURE — 36415 COLL VENOUS BLD VENIPUNCTURE: CPT | Performed by: OBSTETRICS & GYNECOLOGY

## 2022-12-27 PROCEDURE — 99213 OFFICE O/P EST LOW 20 MIN: CPT | Performed by: OBSTETRICS & GYNECOLOGY

## 2022-12-27 PROCEDURE — 86696 HERPES SIMPLEX TYPE 2 TEST: CPT | Performed by: OBSTETRICS & GYNECOLOGY

## 2022-12-27 PROCEDURE — 87491 CHLMYD TRACH DNA AMP PROBE: CPT | Performed by: OBSTETRICS & GYNECOLOGY

## 2022-12-27 PROCEDURE — 84144 ASSAY OF PROGESTERONE: CPT | Performed by: OBSTETRICS & GYNECOLOGY

## 2022-12-27 PROCEDURE — 86695 HERPES SIMPLEX TYPE 1 TEST: CPT | Performed by: OBSTETRICS & GYNECOLOGY

## 2022-12-27 PROCEDURE — 84443 ASSAY THYROID STIM HORMONE: CPT | Performed by: OBSTETRICS & GYNECOLOGY

## 2022-12-27 PROCEDURE — 87591 N.GONORRHOEAE DNA AMP PROB: CPT | Performed by: OBSTETRICS & GYNECOLOGY

## 2022-12-27 NOTE — PATIENT INSTRUCTIONS
Please call clinic on the first day of your next period to schedule the hysterosalpingogram (HSG) test. You will want to check with your insurance to see if this is covered. If the insurance company asks the diagnosis code used is Z31.41 (fertility testing).     You can also schedule a [employer] message to have your blood work drawn on day 2 or day 3 of your period. We will be checking your FSH and estradiol hormone levels.     Please also talk to your partner about the semen analysis and if he would like to get this done please let me know.     You should schedule a follow up visit after the HSG test is done to review your results and discuss next steps.

## 2022-12-27 NOTE — PROGRESS NOTES
GYN Progress Note     CC: Fertility concerns     HISTORY OF PRESENT ILLNESS:  Cindy Pena is a 22 year old G0 who presents due to fertility concerns. She has been trying to conceive with her  for the past year and reports fairly regular cycles (most 32-34 days) but some will be closer to 40 days. She typically has a positive LH surge each month based on home ovulation tests but the difference in cycle length is making it difficult to track and time intercourse. She has not had any fertility testing. Her partner is in good health and has a 1 YO child, she does not think he had any issues with conceive that child and he has not yet had a semen analysis. She denies issues with dyspareunia or erectile/ejaculatory dysfunction.     OB HISTORY:     GYN HISTORY:  She denies a history of abnormal pap smears, NIL pap in 2019   She denies hx of STIs but reported that her ex told her he had HSV after they broke up. She does recall one episode of genital lesions that were swabbed but tested negative for herpes.     PAST MEDICAL HISTORY:  Past Medical History:   Diagnosis Date     Anxiety      History of malaria      HSV (herpes simplex virus) infection        PAST SURGICAL HISTORY:  No past surgical history on file.    CURRENT MEDICATIONS:   Current Outpatient Medications   Medication Sig Dispense Refill     clindamycin (CLEOCIN T) 1 % external lotion Apply topically 2 times daily 60 mL 0     venlafaxine (EFFEXOR-ER) 37.5 MG 24 hr tablet Take 1 tablet (37.5 mg) by mouth daily 30 tablet 1       ALLERGIES:  Patient has no known allergies.    SOCIAL HISTORY:  Social History     Tobacco Use     Smoking status: Never     Smokeless tobacco: Never   Vaping Use     Vaping Use: Never used   Substance Use Topics     Alcohol use: Not Currently     Comment: occasional     Drug use: No       FAMILY HISTORY:  Family History   Problem Relation Age of Onset     Liver Disease Mother         Liver transplant      Diabetes Mother  "     Asthma Brother      Diabetes Brother      Asthma Brother      Breast Cancer Maternal Aunt      Coronary Artery Disease No family hx of      Hypertension No family hx of      Hyperlipidemia No family hx of      Colon Cancer No family hx of      Cerebrovascular Disease No family hx of      Depression No family hx of      Anxiety Disorder No family hx of      Thyroid Disease No family hx of      Seizure Disorder No family hx of      Migraines No family hx of        REVIEW OF SYSTEMS:  See HPI        PHYSICAL EXAMINATION:  VS:/82   Pulse 86   Ht 1.613 m (5' 3.5\")   Wt 78.5 kg (173 lb)   LMP 12/05/2022   SpO2 100%   BMI 30.16 kg/m    Body mass index is 30.16 kg/m .    General: Patient alert and oriented, no acute distress  CV: no peripheral edema or cyanosis  Resp: normal respiratory effort and equal lung expansion  Abdomen: non-tender to light and deep palpation, no masses, organomegaly or hernia  : normal external genitalia without lesions. Urethral meatus normal, urethra and bladder non-tender to palpation. Vaginal mucosa normal in appearance without lesions, scant physiologic discharge. Cervix appears grossly normal.  Uterus normal size and contour, non-tender. No adnexal fullness or masses present.  Ext: non-tender, no edema    Laboratory values:  Imaging findings:        ASSESSMENT:  Cindy Pena is a 22 year old G0 who presents for evaluation of fertility testing    PLAN:  (Z31.41) Fertility testing  (primary encounter diagnosis)  We discussed that with 85% of couples will become pregnant within one year of regular, unprotected intercourse and that it would be reasonable to pursue a fertility evaluation at this time. We reviewed etiologies for infertility including ovarian reserve, endometriosis, tubal factor and male factor. We also discussed that even in couples that do not become pregnant within a year some of them will go on to become pregnant without interventions. We reviewed the " following plan:  -Progesterone drawn today as patient is mid luteal phase today to confirm ovulation   -Ovarian reserve: order placed for estradiol, FSH and AMH. TSH levels also ordered  -Tubal factor: we discussed the procedure of a hysterosalpinogram. She was instructed to call the clinic on the first day of her next menstrual cycle so that the test can be scheduled for CD 5-7.  -Male factor: I recommended that her partner have a semen analysis but she would prefer to start with her evaluation first.   Plan: Progesterone, Anti-Mullerian hormone, XR         Hysterosalpingogram, TSH with free T4 reflex,         Progesterone, Follicle stimulating hormone,         Estradiol         (Z11.3) Routine screening for STI (sexually transmitted infection)  Comment: Patient has known HSV exposure and history of a possible outbreak but reports the lesions were swabbed and negative for HSV. Will obtain antibody testing and if HSV-2 antibodies positive we discussed that the outbreak of lesions she described was very likely HSV.   Plan: Herpes Simplex Virus 1 and 2 IgG, Chlamydia         trachomatis PCR - Clinic Collect, Neisseria         gonorrhoeae PCR - Clinic Collect            (Z12.4) Cervical cancer screening  Plan: Pap screen only - recommended age 21 - 24 years    Dispo: follow up in 4-6 weeks after above evaluation completed to discuss next steps     Jeri Bowens MD

## 2022-12-28 LAB
C TRACH DNA SPEC QL NAA+PROBE: NEGATIVE
HSV1 IGG SERPL QL IA: 0.52 INDEX
HSV1 IGG SERPL QL IA: ABNORMAL
HSV2 IGG SERPL QL IA: 15 INDEX
HSV2 IGG SERPL QL IA: ABNORMAL
N GONORRHOEA DNA SPEC QL NAA+PROBE: NEGATIVE

## 2022-12-28 NOTE — TELEPHONE ENCOUNTER
Let her know that I still think she should give the Efffexor XR a trial; can pay out of pocket for a few months, to see if it's more effective than her past trrial of wellbutrin and sertaline.

## 2022-12-28 NOTE — TELEPHONE ENCOUNTER
Called pt and LVM with direct number for call back.     Sandra Rose, EMT at 2:31 PM on December 28, 2022  [facility]  [phone]

## 2022-12-29 LAB
BKR LAB AP GYN ADEQUACY: NORMAL
BKR LAB AP GYN INTERPRETATION: NORMAL
BKR LAB AP HPV REFLEX: NO
BKR LAB AP PREVIOUS ABNORMAL: NORMAL
PATH REPORT.COMMENTS IMP SPEC: NORMAL
PATH REPORT.COMMENTS IMP SPEC: NORMAL
PATH REPORT.RELEVANT HX SPEC: NORMAL

## 2022-12-29 NOTE — TELEPHONE ENCOUNTER
Attempt #2. Mychart sent    Sandra Rose, EMT at 8:33 AM on December 29, 2022  [facility] Guide  [phone]

## 2022-12-31 ENCOUNTER — LAB (OUTPATIENT)
Dept: LAB | Facility: CLINIC | Age: 22
End: 2022-12-31
Payer: COMMERCIAL

## 2022-12-31 DIAGNOSIS — Z31.41 FERTILITY TESTING: ICD-10-CM

## 2022-12-31 LAB — PROGEST SERPL-MCNC: 7.7 NG/ML

## 2022-12-31 PROCEDURE — 84144 ASSAY OF PROGESTERONE: CPT | Performed by: PATHOLOGY

## 2022-12-31 PROCEDURE — 36415 COLL VENOUS BLD VENIPUNCTURE: CPT | Performed by: PATHOLOGY

## 2022-12-31 PROCEDURE — 99000 SPECIMEN HANDLING OFFICE-LAB: CPT | Performed by: PATHOLOGY

## 2023-01-01 NOTE — PROGRESS NOTES
Cindy is a 21 year old who is being evaluated via a billable video visit.      How would you like to obtain your AVS? MyChart  If the video visit is dropped, the invitation should be resent by:   Will anyone else be joining your video visit? No      Assessment & Plan     (L70.0) Acne vulgaris  (primary encounter diagnosis)  Comment: Failed adapalene twice  Plan: tretinoin (RETIN-A) 0.01 % external gel        Again, I recommend the patient uses of the topical tretinoin daily, and the antibiotic pill daily as needed (which she can choose to continue as daily if she wants).   Return in about 8 months (around 5/18/2022) for recheck depression, recheck medications.      (F41.9) Anxiety  (F32.0) Major depressive disorder, single episode, mild (H)  Comment: Control is a little worse off sertraline, which has been resumed now for about 5 to 6 weeks  Plan: sertraline (ZOLOFT) 100 MG tablet        Recheck PHQ-9 and TRAVIS-7 in 1/2 to 2 months        47 minutes spent on the date of the encounter doing chart review, patient visit and documentation       Return in about 8 months (around 5/18/2022) for recheck depression, recheck medications.    Avinash Styles MD  [facility]    Elton Whiet is a 21 year old who presents for the following health issues     HPI   She  presents to discuss acne. She has had acne for over 2 years (predating her Nexplanon implantation)  . Current and past treatments used: tretinoin (current), adapalene gel (failed) and oral antibiotics (Bactrim, current). She reports that she has been taking the oral antibiotic daily, as it seems to help more than the topical gel. Further questioning reveals that when she went to have her tretinoin prescription filled, the pharmacy there told her that she could buy something over-the-counter that is nearly the same as the prescription, which she did, but it did not seem to work. [I told the patient that I suspect that this was OTC  Encounter for routine child health examination without abnormal findings      I saw the patient with the resident, reviewed their findings, assessment, and plan, and agree with the content of their clinical note for today.    2 aunts (father of Ritika is their half-brother) here with the baby, have mother's permission to keep baby for next week or so to get rash healed. Father in half-way currently, family living in house with multiple transient residents and active drug use, heavy smoking.  When aunts  child she always smells like smoke and is unwashed.  Aunts willing to take baby full-time, and mother also if needed. Have contacted CPS prior to ED visit but no follow-up yet. Encouraged to call CPS, get a  contact established, can call us if our input is needed. At this time baby appears healthy with good growth.  RTO 2-3 weeks for shots.   adapalene, which she had already failed]    Anxiety Follow-Up    How are you doing with your anxiety since your last visit? Worsened. Since she is getting better, she decided to stop taking sertraline (approximately late July). She felt worse after that, so she decided to start taking it again 8/13/2021.    Are you having other symptoms that might be associated with anxiety? No    Have you had a significant life event? No     Are you feeling depressed? Yes:  .    Do you have any concerns with your use of alcohol or other drugs? No    Social History     Tobacco Use     Smoking status: Never Smoker     Smokeless tobacco: Never Used   Substance Use Topics     Alcohol use: Yes     Comment: occasional     Drug use: No     TRAVIS-7 SCORE 1/6/2021 2/1/2021 9/27/2021   Total Score 10 3 15     PHQ 1/6/2021 2/1/2021 9/27/2021   PHQ-9 Total Score 8 3 10   Q9: Thoughts of better off dead/self-harm past 2 weeks Not at all Not at all Not at all       Review of Systems         Objective           Vitals:  No vitals were obtained today due to virtual visit.    Physical Exam   GENERAL: Healthy, alert and no distress  EYES: Eyes grossly normal to inspection.  No discharge or erythema, or obvious scleral/conjunctival abnormalities.  RESP: No audible wheeze, cough, or visible cyanosis.  No visible retractions or increased work of breathing.    SKIN: Visible skin clear. No significant rash, abnormal pigmentation or lesions.  NEURO: Cranial nerves grossly intact.  Mentation and speech appropriate for age.  PSYCH: Mentation appears normal, affect normal/bright, judgement and insight intact, normal speech and appearance well-groomed.                Video-Visit Details    Type of service:  Video Visit    Video Start Time: 11:08 AM    Video End Time:11:23 AM    Originating Location (pt. Location): Home    Distant Location (provider location):  [facility]     Platform used for Video Visit: [employer]

## 2023-06-05 NOTE — PROGRESS NOTES
SUBJECTIVE:                                                   Cindy Pena is a 23 year old who presents to clinic today for the following health issue(s):  Patient presents with:  Other      HPI:  Cindy presents today for infertility consultation.  She previously had a consult with Dr. Bowens 2022, but lost her insurance.  She desires to establish care with our clinic as it is closer to where she lives, desires PCOS workup and further infertility testing and options.     Since 2022, she states that she has had the following changes:    Irregular cycles, now every 43-45 days  Weight loss of 16lbs  Started adderall end of May 2023  Dysuria that started approximately 1 week ago.    She has been tracking her ovulation using OPKs , reports that she has + LH surgre per kit most cycles, but seems irregular.  She reports that 1 cycle since Dec she had 2 + tests.  She has been TTC since Dec. 2021.  She has been using timed intercourse with +LH surge and has not become pregnant.     Her SO is in good health, has a 2yo child, has never had a semen analysis.    Had AMH, progesterone, TSH, CMP, CBC, STI screening, pap 22.  Had orders for estradiol, FSH, hysterosalpingoram but has not has not scheduled yet.     Patient's last menstrual period was 2023.  Menstrual History: frequency: every 43-45 days, lasting 3-5 days, heavy day 1, light remaining days  Patient is sexually active  .  Using none for contraception.   Health maintenance updated:  yes  STI infx testing offered:  Declined    Last PHQ-9 score on record =       2023    11:17 AM   PHQ-9 SCORE   PHQ-9 Total Score 4     Last GAD7 score on record =       2023    11:17 AM   TRAVIS-7 SCORE   Total Score 3     Problem list and histories reviewed & adjusted, as indicated.  Additional history: as documented.    Patient Active Problem List   Diagnosis     Family history of liver failure     Acne vulgaris     Moderate recurrent major  "depression (H)     No past surgical history on file.   Social History     Tobacco Use     Smoking status: Never     Smokeless tobacco: Never   Vaping Use     Vaping status: Never Used     Passive vaping exposure: Yes   Substance Use Topics     Alcohol use: Not Currently     Comment: occasional      Problem (# of Occurrences) Relation (Name,Age of Onset)    Asthma (2) Brother (Ellen), Brother    Diabetes (2) Mother, Brother (Ellen)    Breast Cancer (1) Maternal Aunt    Liver Disease (1) Mother: Liver transplant        Negative family history of: Coronary Artery Disease, Hypertension, Hyperlipidemia, Colon Cancer, Cerebrovascular Disease, Depression, Anxiety Disorder, Thyroid Disease, Seizure Disorder, Migraines            Current Outpatient Medications   Medication Sig     amphetamine-dextroamphetamine (ADDERALL) 15 MG tablet Take 15 mg by mouth 2 times daily     Prenatal Vit-DSS-Fe Cbn-FA (PRENACARE PO)      No current facility-administered medications for this visit.     No Known Allergies    ROS:  12 point review of systems negative other than symptoms noted below or in the HPI.  12 point review of systems negative other than symptoms noted below.    OBJECTIVE:     /66   Pulse 68   Ht 1.6 m (5' 3\")   Wt 71.2 kg (157 lb)   LMP 05/11/2023   BMI 27.81 kg/m    Body mass index is 27.81 kg/m .    PHYSICAL EXAM:  Constitutional:  Appearance: Well nourished, well developed alert, in no acute distress  Neurologic:  Mental Status:  Oriented X3.  Normal strength and tone, sensory exam grossly normal, mentation intact and speech normal.    Psychiatric:  Mentation appears normal and affect normal/bright.  No Pelvic Exam performed, declined.  Desired to have pelvic US     In-Clinic Test Results:  Results for orders placed or performed in visit on 06/06/23 (from the past 24 hour(s))   UA with Microscopic reflex to Culture - lab collect    Specimen: Urine, Midstream   Result Value Ref Range    Color Urine Yellow " Colorless, Straw, Light Yellow, Yellow    Appearance Urine Clear Clear    Glucose Urine Negative Negative mg/dL    Bilirubin Urine Negative Negative    Ketones Urine Negative Negative mg/dL    Specific Gravity Urine <=1.005 1.003 - 1.035    Blood Urine Negative Negative    pH Urine 6.0 5.0 - 7.0    Protein Albumin Urine Negative Negative mg/dL    Urobilinogen Urine 0.2 0.2, 1.0 E.U./dL    Nitrite Urine Negative Negative    Leukocyte Esterase Urine Negative Negative   Testosterone Free and Total    Narrative    The following orders were created for panel order Testosterone Free and Total.  Procedure                               Abnormality         Status                     ---------                               -----------         ------                     Sex Hormone Binding Glob...[[id number]]                      In process                 Testosterone Free and Total[[id number]]                      In process                   Please view results for these tests on the individual orders.   Hemoglobin A1c   Result Value Ref Range    Hemoglobin A1C 5.5 0.0 - 5.6 %   UA Microscopic with Reflex to Culture   Result Value Ref Range    Bacteria Urine None Seen None Seen /HPF    RBC Urine 0-2 0-2 /HPF /HPF    WBC Urine 0-5 0-5 /HPF /HPF    Narrative    Urine Culture not indicated       ASSESSMENT/PLAN:                                                        ICD-10-CM    1. Primary oligomenorrhea  N91.3 UA with Microscopic reflex to Culture - lab collect     TSH with free T4 reflex     Testosterone Free and Total     Hemoglobin A1c     Androstenedione     Dehydroepiandrosterone     US Pelvic Complete with Transvaginal     HCG quantitative pregnancy     UA with Microscopic reflex to Culture - lab collect     TSH with free T4 reflex     Testosterone Free and Total     Hemoglobin A1c     Androstenedione     Dehydroepiandrosterone     HCG quantitative pregnancy     UA Microscopic with Reflex to Culture      2. Infertility  due to oligo-ovulation  N97.0 UA with Microscopic reflex to Culture - lab collect     TSH with free T4 reflex     Testosterone Free and Total     Hemoglobin A1c     Androstenedione     Dehydroepiandrosterone     US Pelvic Complete with Transvaginal     HCG quantitative pregnancy     UA with Microscopic reflex to Culture - lab collect     TSH with free T4 reflex     Testosterone Free and Total     Hemoglobin A1c     Androstenedione     Dehydroepiandrosterone     HCG quantitative pregnancy     UA Microscopic with Reflex to Culture      3. Weight loss  R63.4 UA with Microscopic reflex to Culture - lab collect     TSH with free T4 reflex     Testosterone Free and Total     Hemoglobin A1c     Androstenedione     Dehydroepiandrosterone     US Pelvic Complete with Transvaginal     UA with Microscopic reflex to Culture - lab collect     TSH with free T4 reflex     Testosterone Free and Total     Hemoglobin A1c     Androstenedione     Dehydroepiandrosterone     UA Microscopic with Reflex to Culture      4. Dysuria  R30.0 UA with Microscopic reflex to Culture - lab collect     TSH with free T4 reflex     Testosterone Free and Total     Hemoglobin A1c     Androstenedione     Dehydroepiandrosterone     US Pelvic Complete with Transvaginal     UA with Microscopic reflex to Culture - lab collect     TSH with free T4 reflex     Testosterone Free and Total     Hemoglobin A1c     Androstenedione     Dehydroepiandrosterone     UA Microscopic with Reflex to Culture      5. Fertility testing  Z31.41 Follicle stimulating hormone     Estradiol          COUNSELING:  -Discussed that because she has been TTC for > 1yr, would recommend further infertility work up.  Would also recommend PCOS work up as requested by patient.   -she desires to have labs, US and any other possible testing done here.  -discussed that after lab results and pelvic US are back, will review results and will refer to OBGYN in our clinic or other infertility clinic of  her choice.  At this time, she prefers to see an OBGYN in this clinic.  -UA w/reflex to UC done d/t c/o dysuria, will treat if culture is +  -preconception counseling provided   -Adderall safety reviewed on [email or web address] website  -Recommend f/u with OBGYN after US and lab results reviewed at her next appt      Lynda BRADSHAW CNM, [facility]  [phone]    40 minutes spent by me on the date of the encounter doing chart review, review of test results, patient visit and documentation

## 2023-06-06 ENCOUNTER — OFFICE VISIT (OUTPATIENT)
Dept: MIDWIFE SERVICES | Facility: CLINIC | Age: 23
End: 2023-06-06
Payer: COMMERCIAL

## 2023-06-06 VITALS
HEIGHT: 63 IN | DIASTOLIC BLOOD PRESSURE: 66 MMHG | HEART RATE: 68 BPM | WEIGHT: 157 LBS | SYSTOLIC BLOOD PRESSURE: 110 MMHG | BODY MASS INDEX: 27.82 KG/M2

## 2023-06-06 DIAGNOSIS — N97.0 INFERTILITY DUE TO OLIGO-OVULATION: ICD-10-CM

## 2023-06-06 DIAGNOSIS — Z31.41 FERTILITY TESTING: ICD-10-CM

## 2023-06-06 DIAGNOSIS — R30.0 DYSURIA: ICD-10-CM

## 2023-06-06 DIAGNOSIS — R63.4 WEIGHT LOSS: ICD-10-CM

## 2023-06-06 DIAGNOSIS — N91.3 PRIMARY OLIGOMENORRHEA: Primary | ICD-10-CM

## 2023-06-06 LAB
ALBUMIN UR-MCNC: NEGATIVE MG/DL
APPEARANCE UR: CLEAR
BACTERIA #/AREA URNS HPF: NORMAL /HPF
BILIRUB UR QL STRIP: NEGATIVE
COLOR UR AUTO: YELLOW
GLUCOSE UR STRIP-MCNC: NEGATIVE MG/DL
HBA1C MFR BLD: 5.5 % (ref 0–5.6)
HGB UR QL STRIP: NEGATIVE
KETONES UR STRIP-MCNC: NEGATIVE MG/DL
LEUKOCYTE ESTERASE UR QL STRIP: NEGATIVE
NITRATE UR QL: NEGATIVE
PH UR STRIP: 6 [PH] (ref 5–7)
RBC #/AREA URNS AUTO: NORMAL /HPF
SP GR UR STRIP: <=1.005 (ref 1–1.03)
UROBILINOGEN UR STRIP-ACNC: 0.2 E.U./DL
WBC #/AREA URNS AUTO: NORMAL /HPF

## 2023-06-06 PROCEDURE — 83036 HEMOGLOBIN GLYCOSYLATED A1C: CPT | Performed by: NURSE PRACTITIONER

## 2023-06-06 PROCEDURE — 99215 OFFICE O/P EST HI 40 MIN: CPT | Performed by: NURSE PRACTITIONER

## 2023-06-06 PROCEDURE — 84270 ASSAY OF SEX HORMONE GLOBUL: CPT | Performed by: NURSE PRACTITIONER

## 2023-06-06 PROCEDURE — 36415 COLL VENOUS BLD VENIPUNCTURE: CPT | Performed by: NURSE PRACTITIONER

## 2023-06-06 PROCEDURE — 81001 URINALYSIS AUTO W/SCOPE: CPT | Performed by: NURSE PRACTITIONER

## 2023-06-06 PROCEDURE — 99000 SPECIMEN HANDLING OFFICE-LAB: CPT | Performed by: NURSE PRACTITIONER

## 2023-06-06 PROCEDURE — 82670 ASSAY OF TOTAL ESTRADIOL: CPT | Performed by: NURSE PRACTITIONER

## 2023-06-06 PROCEDURE — 84443 ASSAY THYROID STIM HORMONE: CPT | Performed by: NURSE PRACTITIONER

## 2023-06-06 PROCEDURE — 82626 DEHYDROEPIANDROSTERONE: CPT | Mod: 90 | Performed by: NURSE PRACTITIONER

## 2023-06-06 PROCEDURE — 83001 ASSAY OF GONADOTROPIN (FSH): CPT | Performed by: NURSE PRACTITIONER

## 2023-06-06 PROCEDURE — 84403 ASSAY OF TOTAL TESTOSTERONE: CPT | Performed by: NURSE PRACTITIONER

## 2023-06-06 PROCEDURE — 84702 CHORIONIC GONADOTROPIN TEST: CPT | Performed by: NURSE PRACTITIONER

## 2023-06-06 PROCEDURE — 82157 ASSAY OF ANDROSTENEDIONE: CPT | Mod: 90 | Performed by: NURSE PRACTITIONER

## 2023-06-06 RX ORDER — DEXTROAMPHETAMINE SACCHARATE, AMPHETAMINE ASPARTATE, DEXTROAMPHETAMINE SULFATE AND AMPHETAMINE SULFATE 3.75; 3.75; 3.75; 3.75 MG/1; MG/1; MG/1; MG/1
15 TABLET ORAL 2 TIMES DAILY
COMMUNITY

## 2023-06-06 ASSESSMENT — ANXIETY QUESTIONNAIRES
2. NOT BEING ABLE TO STOP OR CONTROL WORRYING: SEVERAL DAYS
GAD7 TOTAL SCORE: 3
3. WORRYING TOO MUCH ABOUT DIFFERENT THINGS: SEVERAL DAYS
GAD7 TOTAL SCORE: 3
7. FEELING AFRAID AS IF SOMETHING AWFUL MIGHT HAPPEN: NOT AT ALL
6. BECOMING EASILY ANNOYED OR IRRITABLE: NOT AT ALL
5. BEING SO RESTLESS THAT IT IS HARD TO SIT STILL: NOT AT ALL
1. FEELING NERVOUS, ANXIOUS, OR ON EDGE: SEVERAL DAYS
IF YOU CHECKED OFF ANY PROBLEMS ON THIS QUESTIONNAIRE, HOW DIFFICULT HAVE THESE PROBLEMS MADE IT FOR YOU TO DO YOUR WORK, TAKE CARE OF THINGS AT HOME, OR GET ALONG WITH OTHER PEOPLE: NOT DIFFICULT AT ALL

## 2023-06-06 ASSESSMENT — PATIENT HEALTH QUESTIONNAIRE - PHQ9
SUM OF ALL RESPONSES TO PHQ QUESTIONS 1-9: 4
5. POOR APPETITE OR OVEREATING: NOT AT ALL

## 2023-06-07 LAB
ESTRADIOL SERPL-MCNC: 255 PG/ML
FSH SERPL IRP2-ACNC: 1.7 MIU/ML
HCG INTACT+B SERPL-ACNC: <1 MIU/ML
SHBG SERPL-SCNC: 49 NMOL/L (ref 30–135)
TSH SERPL DL<=0.005 MIU/L-ACNC: 0.7 UIU/ML (ref 0.3–4.2)

## 2023-06-08 LAB
TESTOST FREE SERPL-MCNC: 0.42 NG/DL
TESTOST SERPL-MCNC: 30 NG/DL (ref 8–60)

## 2023-06-09 LAB
ANDROST SERPL-MCNC: 2.19 NG/ML
DHEA SERPL-MCNC: 5.06 NG/ML

## 2023-06-12 NOTE — RESULT ENCOUNTER NOTE
Informed via mychart, all labs normal with the exception of slightly elevated androstenedione, discussed it can sometimes be elevated with PCOS. Patient has US and visit with Jina the end of the month, recommend she consider changing visit to meet with MD to discuss infertility.    LEEANN Kahn, CNM

## 2023-06-20 ASSESSMENT — ENCOUNTER SYMPTOMS
ABDOMINAL PAIN: 1
MYALGIAS: 0
SHORTNESS OF BREATH: 0
BREAST MASS: 0
CHILLS: 0
DIZZINESS: 0
HEADACHES: 0
HEARTBURN: 0
ARTHRALGIAS: 0
PARESTHESIAS: 0
JOINT SWELLING: 0
EYE PAIN: 0
COUGH: 0
SORE THROAT: 0
FREQUENCY: 0
NERVOUS/ANXIOUS: 0
PALPITATIONS: 1
DIARRHEA: 0
DYSURIA: 0
NAUSEA: 1
CONSTIPATION: 0
HEMATOCHEZIA: 0
HEMATURIA: 0
FEVER: 0
WEAKNESS: 0

## 2023-06-21 ENCOUNTER — OFFICE VISIT (OUTPATIENT)
Dept: FAMILY MEDICINE | Facility: CLINIC | Age: 23
End: 2023-06-21
Payer: COMMERCIAL

## 2023-06-21 VITALS
DIASTOLIC BLOOD PRESSURE: 86 MMHG | HEART RATE: 109 BPM | RESPIRATION RATE: 18 BRPM | HEIGHT: 64 IN | WEIGHT: 150 LBS | TEMPERATURE: 98.1 F | OXYGEN SATURATION: 100 % | BODY MASS INDEX: 25.61 KG/M2 | SYSTOLIC BLOOD PRESSURE: 126 MMHG

## 2023-06-21 DIAGNOSIS — N89.8 VAGINAL DISCHARGE: ICD-10-CM

## 2023-06-21 DIAGNOSIS — B96.89 BACTERIAL VAGINOSIS: Primary | ICD-10-CM

## 2023-06-21 DIAGNOSIS — N76.0 BACTERIAL VAGINOSIS: Primary | ICD-10-CM

## 2023-06-21 PROBLEM — K29.70 HELICOBACTER PYLORI GASTRITIS: Status: ACTIVE | Noted: 2023-06-21

## 2023-06-21 PROBLEM — B96.81 HELICOBACTER PYLORI GASTRITIS: Status: ACTIVE | Noted: 2023-06-21

## 2023-06-21 LAB
C TRACH DNA SPEC QL NAA+PROBE: NEGATIVE
CLUE CELLS: PRESENT
N GONORRHOEA DNA SPEC QL NAA+PROBE: NEGATIVE
TRICHOMONAS, WET PREP: ABNORMAL
WBC'S/HIGH POWER FIELD, WET PREP: ABNORMAL
YEAST, WET PREP: ABNORMAL

## 2023-06-21 PROCEDURE — 99214 OFFICE O/P EST MOD 30 MIN: CPT | Performed by: FAMILY MEDICINE

## 2023-06-21 PROCEDURE — 87210 SMEAR WET MOUNT SALINE/INK: CPT | Performed by: FAMILY MEDICINE

## 2023-06-21 PROCEDURE — 87491 CHLMYD TRACH DNA AMP PROBE: CPT | Performed by: FAMILY MEDICINE

## 2023-06-21 PROCEDURE — 87591 N.GONORRHOEAE DNA AMP PROB: CPT | Performed by: FAMILY MEDICINE

## 2023-06-21 NOTE — PROGRESS NOTES
"Addendum, June 21, 2023, 8:15 PM:  Wet prep c/w BV. GC / Ch negative. Pt elected to treat with 7d oral metronidazole course.    ----    Assessment/Plan.    Vaginal discharge. Suspect yeast or BV. Will check wet prep and GC/Ch.    Suprapubic discomfort. Exam not c/w PID. Could be related to irregular menses. Trend.    ----  Chief complaint: Abdominal Pain and Vaginal Discharge    Vaginal discharge.  This started 2 days ago.  Watery.  Clear/gray.  No odor.  Dyspareunia starting a few days prior to discharge.  Patient denies recent fever, dysuria or hematuria.  She denies a history of recurrent vaginitis.  She denies recent use of douching products.  Patient had STI testing in December 2022.  No new partner since that time.    Abdominal pain.  Patient reports several years of intermittent abdominal discomfort.  The pain typically involves various parts of her abdomen.  Her symptoms improved following treatment for H. pylori around 2020, but the abdominal pain recurred within the past year.    In the past few days, patient has been experiencing a sharp discomfort in the suprapubic area.  Cramping sensation at times.  No emesis.  No blood in stool.  Mildly loose stools.  She reports chronic irregular menses.    Exam  /86 (BP Location: Left arm, Patient Position: Sitting, Cuff Size: Adult Regular)   Pulse 109   Temp 98.1  F (36.7  C) (Temporal)   Resp 18   Ht 1.625 m (5' 3.98\")   Wt 68 kg (150 lb)   LMP 06/10/2023 (Exact Date)   SpO2 100%   BMI 25.77 kg/m    Patient's last menstrual period was 06/10/2023 (exact date).     Abdomen soft, mild tenderness of RLQ + LLQ + suprapubic area without guarding, no palpable masses    Normal external female genitalia, mild/moderate amount of tan/white discharge along vaginal walls, cervix nontender and without lesions    Gwendolyn Duncan RN, present during gynecologic exam.  "

## 2023-06-27 ENCOUNTER — ANCILLARY PROCEDURE (OUTPATIENT)
Dept: ULTRASOUND IMAGING | Facility: CLINIC | Age: 23
End: 2023-06-27
Attending: NURSE PRACTITIONER
Payer: COMMERCIAL

## 2023-06-27 DIAGNOSIS — R30.0 DYSURIA: ICD-10-CM

## 2023-06-27 DIAGNOSIS — R63.4 WEIGHT LOSS: ICD-10-CM

## 2023-06-27 DIAGNOSIS — N91.3 PRIMARY OLIGOMENORRHEA: ICD-10-CM

## 2023-06-27 DIAGNOSIS — N97.0 INFERTILITY DUE TO OLIGO-OVULATION: ICD-10-CM

## 2023-06-27 PROCEDURE — 76830 TRANSVAGINAL US NON-OB: CPT | Performed by: OBSTETRICS & GYNECOLOGY

## 2023-06-27 NOTE — RESULT ENCOUNTER NOTE
Final us report sent via [employer], has appt scheduled Friday with Dr. Thompson.    Kelly Woodall, APRN, CNM

## 2023-06-30 ENCOUNTER — OFFICE VISIT (OUTPATIENT)
Dept: OBGYN | Facility: CLINIC | Age: 23
End: 2023-06-30
Payer: COMMERCIAL

## 2023-06-30 VITALS
DIASTOLIC BLOOD PRESSURE: 78 MMHG | HEIGHT: 64 IN | BODY MASS INDEX: 25.85 KG/M2 | WEIGHT: 151.4 LBS | SYSTOLIC BLOOD PRESSURE: 116 MMHG

## 2023-06-30 DIAGNOSIS — N83.202 LEFT OVARIAN CYST: ICD-10-CM

## 2023-06-30 DIAGNOSIS — N92.6 IRREGULAR PERIODS/MENSTRUAL CYCLES: Primary | ICD-10-CM

## 2023-06-30 PROCEDURE — 99215 OFFICE O/P EST HI 40 MIN: CPT | Performed by: OBSTETRICS & GYNECOLOGY

## 2023-06-30 NOTE — PROGRESS NOTES
SUBJECTIVE:                                                   Cindy Pena is a 23 year old female who presents to clinic today for the following health issue(s):  Patient presents with:  Follow Up        HPI:  Hs been trying for 2 years  Patient is here to discuss fertility    Prior fertility tests performed: Has some blood work done. Was seen by Dr. Bowens at [place]  Prior fertility treatments: none  Length of time attempting conception: about 2 years  Prior pregnancies: none    Has been doing OPK tests.     She has been  irregular menstrual cycles since January, but last month cycle was regular. when cycle are regular they are approximately every 29-35 days, they last about 3-4 days.    Lost about 30 pounds since January.    Dysmenorrhea: No.   Menorrhagia: No.    Menarche: age 12.    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      Partner/Spouse:   Name: Cdoy  Medical problems: none  Urologic surgery: No  Fathered any previous pregnancies or children:Yes: child is 3. No troubles conceiving    Has not yet have an HSG.    Partner has not done semen    AMH: 3.510  Progesterone was low, but repeat normal  All other labs normal      Patient's last menstrual period was 06/10/2023 (exact date)..     Patient is sexually active, .  Using none for contraception.   STD testing offered?  Declined   reports that she has never smoked. She has never used smokeless tobacco.  .  Health maintenance updated:  See Care Gaps    Today's PHQ-2 Score:       2023    11:17 AM   PHQ-2 (  [employer])   Q1: Little interest or pleasure in doing things 0   Q2: Feeling down, depressed or hopeless 0   PHQ-2 Score 0     Today's PHQ-9 Score:       2023    11:17 AM   PHQ-9 SCORE   PHQ-9 Total Score 4     Today's TRAVIS-7 Score:       2023    11:17 AM   TRAVIS-7 SCORE   Total Score 3       Problem list and histories reviewed & adjusted, as  "indicated.  Additional history: as documented.    Patient Active Problem List   Diagnosis     Family history of liver failure     Acne vulgaris     Moderate recurrent major depression (H)     Helicobacter pylori gastritis     No past surgical history on file.   Social History     Tobacco Use     Smoking status: Never     Smokeless tobacco: Never   Substance Use Topics     Alcohol use: Not Currently     Comment: occasional      Problem (# of Occurrences) Relation (Name,Age of Onset)    Asthma (2) Brother (Hasan), Brother    Diabetes (2) Mother, Brother (Hasan)    Breast Cancer (1) Maternal Aunt    Liver Disease (1) Mother: Liver transplant        Negative family history of: Coronary Artery Disease, Hypertension, Hyperlipidemia, Colon Cancer, Cerebrovascular Disease, Depression, Anxiety Disorder, Thyroid Disease, Seizure Disorder, Migraines            Current Outpatient Medications   Medication Sig     amphetamine-dextroamphetamine (ADDERALL) 15 MG tablet Take 15 mg by mouth 2 times daily     Prenatal Vit-DSS-Fe Cbn-FA (PRENACARE PO)      metroNIDAZOLE (FLAGYL) 500 MG tablet Take 1 tablet (500 mg) by mouth 2 times daily (Patient not taking: Reported on 6/30/2023)     No current facility-administered medications for this visit.     No Known Allergies    ROS:  12 point review of systems negative other than symptoms noted below or in the HPI.  No urinary frequency or dysuria, bladder or kidney problems      OBJECTIVE:     /78   Ht 1.625 m (5' 3.98\")   Wt 68.7 kg (151 lb 6.4 oz)   LMP 06/10/2023 (Exact Date)   BMI 26.01 kg/m    Body mass index is 26.01 kg/m .    Exam:  Constitutional:  Appearance: Well nourished, well developed alert, in no acute distress  Psychiatric:  Mentation appears normal and affect normal/bright.     In-Clinic Test Results:  No results found for this or any previous visit (from the past 24 hour(s)).    ASSESSMENT/PLAN:                                                        ICD-10-CM    1. " Irregular periods/menstrual cycles  N92.6 Progesterone      2. Left ovarian cyst  N83.202 US Pelvic Complete with Transvaginal          There are no Patient Instructions on file for this visit.    I had a detailed conversation with them regarding evaluation for fertility:  1.  Ovarian function:    Cycle day 3 labs:  FSH, LH, estradiol, AMH, and prolactin: These were normal. Do not need to repeat at this time  Cycle day 21:  Progesterone level. Will recheck with this cycle  2.  Other hormonal causes of irregular cycles or oligo ovulation:  TSH with reflex free T4 was also normal  3.  Uterine and tubal anatomy:  Hysterosalpingogram:  Reviewed how this procedure is performed, would schedule on cycle day 5 through 12.  Use to evaluate lining of the endometrium to make sure that it appears normal and to assess whether the fallopian tubes are blocked or open.  Pelvic US results reviewed.  Recommend follow-up ovarian cyst in 3-6 months  4.  Possible abnormal sperm:  Partner/spouse to give a semen sample for semen analysis.    Additionally discussed PCOS  Discussed polycystic ovarian syndrome. Explained that polycystic ovary syndrome (PCOS) is an important cause of both menstrual irregularity and androgen excess in women. Discussed that it can cause irregular menstrual cycles, hirsutism, obesity, insulin resistance, and anovulatory infertility. Counseled that diet and exercise for weight reduction are the first steps for overweight and obese women with PCOS. Weight loss can restore ovulatory cycles and improve metabolic risk. Also discussed that a lower carb diet is often beneficial. She has lost 30 pounds since december and has noted significant improvement in her menstrual cycles.    (45 minutes was spent on the date of the encounter doing chart review, review of outside records, review and interpretation of pertinent test results, history and exam, documentation, patient counseling, and further activities as noted  above.)      Gemini Thompson MD  [facility] [place]

## 2023-07-05 ENCOUNTER — LAB (OUTPATIENT)
Dept: LAB | Facility: CLINIC | Age: 23
End: 2023-07-05
Payer: COMMERCIAL

## 2023-07-05 DIAGNOSIS — N92.6 IRREGULAR PERIODS/MENSTRUAL CYCLES: ICD-10-CM

## 2023-07-05 LAB — PROGEST SERPL-MCNC: 5.2 NG/ML

## 2023-07-05 PROCEDURE — 84144 ASSAY OF PROGESTERONE: CPT

## 2023-07-05 PROCEDURE — 36415 COLL VENOUS BLD VENIPUNCTURE: CPT

## 2023-10-02 ENCOUNTER — OFFICE VISIT (OUTPATIENT)
Dept: OBGYN | Facility: CLINIC | Age: 23
End: 2023-10-02
Payer: COMMERCIAL

## 2023-10-02 VITALS
WEIGHT: 152.4 LBS | BODY MASS INDEX: 27 KG/M2 | DIASTOLIC BLOOD PRESSURE: 82 MMHG | HEIGHT: 63 IN | SYSTOLIC BLOOD PRESSURE: 124 MMHG

## 2023-10-02 DIAGNOSIS — R30.0 DYSURIA: Primary | ICD-10-CM

## 2023-10-02 LAB
ALBUMIN UR-MCNC: >=300 MG/DL
APPEARANCE UR: CLEAR
BACTERIA #/AREA URNS HPF: ABNORMAL /HPF
BILIRUB UR QL STRIP: NEGATIVE
COLOR UR AUTO: YELLOW
GLUCOSE UR STRIP-MCNC: NEGATIVE MG/DL
HGB UR QL STRIP: ABNORMAL
KETONES UR STRIP-MCNC: NEGATIVE MG/DL
LEUKOCYTE ESTERASE UR QL STRIP: ABNORMAL
NITRATE UR QL: NEGATIVE
PH UR STRIP: 7 [PH] (ref 5–7)
RBC #/AREA URNS AUTO: ABNORMAL /HPF
SP GR UR STRIP: >=1.03 (ref 1–1.03)
SQUAMOUS #/AREA URNS AUTO: ABNORMAL /LPF
UROBILINOGEN UR STRIP-ACNC: 1 E.U./DL
WBC #/AREA URNS AUTO: ABNORMAL /HPF

## 2023-10-02 PROCEDURE — 99213 OFFICE O/P EST LOW 20 MIN: CPT | Performed by: NURSE PRACTITIONER

## 2023-10-02 PROCEDURE — 81001 URINALYSIS AUTO W/SCOPE: CPT | Performed by: NURSE PRACTITIONER

## 2023-10-02 PROCEDURE — 87086 URINE CULTURE/COLONY COUNT: CPT | Performed by: NURSE PRACTITIONER

## 2023-10-02 RX ORDER — NITROFURANTOIN 25; 75 MG/1; MG/1
100 CAPSULE ORAL 2 TIMES DAILY
Qty: 10 CAPSULE | Refills: 0 | Status: SHIPPED | OUTPATIENT
Start: 2023-10-02 | End: 2023-10-24

## 2023-10-02 RX ORDER — PHENAZOPYRIDINE HYDROCHLORIDE 100 MG/1
200 TABLET, FILM COATED ORAL 3 TIMES DAILY PRN
Qty: 12 TABLET | Refills: 1 | Status: SHIPPED | OUTPATIENT
Start: 2023-10-02 | End: 2023-10-24

## 2023-10-02 NOTE — PROGRESS NOTES
SUBJECTIVE:                                                   Cindy Pena is a 23 year old female who presents to clinic today for the following health issue(s):  Patient presents with:  Pelvic Pain  Urinary Problem: Blood in urine, pain with urination, pressure and just painful           HPI: patient is c/o of blood in urine and dysuria She states she has not been drinking enough water this past week.  Denies any vaginal symptoms.      Patient's last menstrual period was 2023..     Patient is sexually active, .  Using none for contraception.    reports that she has never smoked. She has never used smokeless tobacco.  STD testing offered?  Declined    Health maintenance updated:  yes    Today's PHQ-2 Score:       2023    11:17 AM   PHQ-2 (  [employer])   Q1: Little interest or pleasure in doing things 0   Q2: Feeling down, depressed or hopeless 0   PHQ-2 Score 0     Today's PHQ-9 Score:       2023    11:17 AM   PHQ-9 SCORE   PHQ-9 Total Score 4     Today's TRAVIS-7 Score:       2023    11:17 AM   TRAVIS-7 SCORE   Total Score 3       Problem list and histories reviewed & adjusted, as indicated.  Additional history: as documented.    Patient Active Problem List   Diagnosis     Family history of liver failure     Acne vulgaris     Moderate recurrent major depression (H)     Helicobacter pylori gastritis     No past surgical history on file.   Social History     Tobacco Use     Smoking status: Never     Smokeless tobacco: Never   Substance Use Topics     Alcohol use: Not Currently     Comment: occasional      Problem (# of Occurrences) Relation (Name,Age of Onset)    Asthma (2) Brother (Hasan), Brother    Diabetes (2) Mother, Brother (Hasan)    Breast Cancer (1) Maternal Aunt    Liver Disease (1) Mother: Liver transplant            Negative family history of: Coronary Artery Disease, Hypertension, Hyperlipidemia, Colon Cancer, Cerebrovascular Disease, Depression, Anxiety Disorder, Thyroid  "Disease, Seizure Disorder, Migraines              Current Outpatient Medications   Medication Sig     amphetamine-dextroamphetamine (ADDERALL) 15 MG tablet Take 15 mg by mouth 2 times daily     nitroFURantoin macrocrystal-monohydrate (MACROBID) 100 MG capsule Take 1 capsule (100 mg) by mouth 2 times daily     phenazopyridine (PYRIDIUM) 100 MG tablet Take 2 tablets (200 mg) by mouth 3 times daily as needed     Prenatal Vit-DSS-Fe Cbn-FA (PRENACARE PO)      metroNIDAZOLE (FLAGYL) 500 MG tablet Take 1 tablet (500 mg) by mouth 2 times daily (Patient not taking: Reported on 6/30/2023)     No current facility-administered medications for this visit.     No Known Allergies    ROS:  12 point review of systems negative other than symptoms noted below or in the HPI.  Genitourinary: Cramps, Painful Urination, and Pelvic Pain  Neurologic: Headaches and lightheaded  Normal menstrual cycles      OBJECTIVE:     /82   Ht 1.6 m (5' 3\")   Wt 69.1 kg (152 lb 6.4 oz)   LMP 09/16/2023   BMI 27.00 kg/m    Body mass index is 27 kg/m .    Exam:  Constitutional:  Appearance: Well nourished, well developed alert, in no acute distress  Neurologic:  Mental Status:  Oriented X3.  Normal strength and tone, sensory exam grossly normal, mentation intact and speech normal.    Psychiatric:  Mentation appears normal and affect normal/bright.     In-Clinic Test Results:  Results for orders placed or performed in visit on 10/02/23 (from the past 24 hour(s))   UA with Microscopic - lab collect   Result Value Ref Range    Color Urine Yellow Colorless, Straw, Light Yellow, Yellow    Appearance Urine Clear Clear    Glucose Urine Negative Negative mg/dL    Bilirubin Urine Negative Negative    Ketones Urine Negative Negative mg/dL    Specific Gravity Urine >=1.030 1.003 - 1.035    Blood Urine Large (A) Negative    pH Urine 7.0 5.0 - 7.0    Protein Albumin Urine >=300 (A) Negative mg/dL    Urobilinogen Urine 1.0 0.2, 1.0 E.U./dL    Nitrite Urine " Negative Negative    Leukocyte Esterase Urine Moderate (A) Negative   Urine Microscopic Exam   Result Value Ref Range    Bacteria Urine Few (A) None Seen /HPF    RBC Urine 10-25 (A) 0-2 /HPF /HPF    WBC Urine 25-50 (A) 0-5 /HPF /HPF    Squamous Epithelials Urine Few (A) None Seen /LPF       ASSESSMENT/PLAN:                                                        ICD-10-CM    1. Dysuria  R30.0 UA with Microscopic - lab collect     UA with Microscopic - lab collect     Urine Microscopic Exam     nitroFURantoin macrocrystal-monohydrate (MACROBID) 100 MG capsule     Urine Culture     Urine Culture     phenazopyridine (PYRIDIUM) 100 MG tablet            Discussed UA results.  UC sent to lab.  Patient to start on medication, increase water.  REturn prn.    LEEANN Gallardo CNP  [facility]

## 2023-10-04 LAB — BACTERIA UR CULT: NORMAL

## 2023-10-06 ENCOUNTER — MYC MEDICAL ADVICE (OUTPATIENT)
Dept: OBGYN | Facility: CLINIC | Age: 23
End: 2023-10-06
Payer: COMMERCIAL

## 2023-10-06 NOTE — TELEPHONE ENCOUNTER
Pt wondering what else could have caused this symptoms if her UC is negative. Pt confused and concerned.  Will stop her Abx at this time. Awaiting to see if pt has had symptom improvement.  Routing pt mychart message to provider to advise.  Cesilia Manning RN on 10/6/2023 at 8:38 AM

## 2023-10-09 NOTE — TELEPHONE ENCOUNTER
Talked with patient today. She states her UTI symptoms did clear with the medication, although UC was negative.  She is having some vaginal discharge right now, does not have a odor, burn or itch per patient, but not normal for her to have.  Recommended if continues come in for vaginal culture with me.  David, RNC

## 2023-10-23 ENCOUNTER — HOSPITAL ENCOUNTER (OUTPATIENT)
Dept: GENERAL RADIOLOGY | Facility: CLINIC | Age: 23
Discharge: HOME | End: 2023-10-23
Attending: OBSTETRICS & GYNECOLOGY | Admitting: OBSTETRICS & GYNECOLOGY
Payer: COMMERCIAL

## 2023-10-23 DIAGNOSIS — Z31.41 FERTILITY TESTING: ICD-10-CM

## 2023-10-23 PROCEDURE — 272N000257 XR HYSTEROSALPINGOGRAM

## 2023-10-23 PROCEDURE — 74740 X-RAY FEMALE GENITAL TRACT: CPT

## 2023-10-23 PROCEDURE — 255N000002 HC RX 255 OP 636: Mod: JZ | Performed by: OBSTETRICS & GYNECOLOGY

## 2023-10-23 RX ORDER — IOPAMIDOL 612 MG/ML
50 INJECTION, SOLUTION INTRAVASCULAR ONCE
Status: COMPLETED | OUTPATIENT
Start: 2023-10-23 | End: 2023-10-23

## 2023-10-23 RX ADMIN — IOPAMIDOL 8 ML: 612 INJECTION, SOLUTION INTRAVENOUS at 13:57

## 2023-10-23 NOTE — DISCHARGE INSTRUCTIONS
HSG (Hysterosalpingogram) Discharge Instructions    Diet and medicines:  You may go back to your normal diet and medicines.  You may take Tylenol (acetaminophen) or Advil (ibuprofen).  Your doctor has prescribed: ___________________________________    Activity: You may go back to your normal routine.    Side effects:  Expect mild cramping today.  You may have bloody spotting or brown, sticky discharge for up to two days.    Other instructions:     Call your doctor if you have:  Hives.  Problems breathing.  Swelling.  Signs of infection, which include:  A fever over 101  F (38.3  C), taken under the tongue.  Unusual discharge from your vagina.  Pain in lower abdomen (belly).    Questions?   Call your hospital:   [facility] [phone]    Patient: ____________________________________    Instructor: __________________________________   Time: ________ Date: ____________

## 2023-10-24 ENCOUNTER — OFFICE VISIT (OUTPATIENT)
Dept: OBGYN | Facility: CLINIC | Age: 23
End: 2023-10-24
Payer: COMMERCIAL

## 2023-10-24 VITALS
DIASTOLIC BLOOD PRESSURE: 82 MMHG | SYSTOLIC BLOOD PRESSURE: 118 MMHG | HEIGHT: 63 IN | WEIGHT: 153 LBS | BODY MASS INDEX: 27.11 KG/M2

## 2023-10-24 DIAGNOSIS — N97.1 TUBAL OCCLUSION: Primary | ICD-10-CM

## 2023-10-24 PROCEDURE — 99213 OFFICE O/P EST LOW 20 MIN: CPT | Performed by: OBSTETRICS & GYNECOLOGY

## 2023-10-24 NOTE — PATIENT INSTRUCTIONS
Reviewed HSG results today in detail with Cindy.  With bilateral proximal tubal occlusion, will refer to infertility specialist for further discussion/management.  No obvious risk factors for tubal disease.

## 2023-10-24 NOTE — PROGRESS NOTES
SUBJECTIVE:                                                   Cindy Pena is a 23 year old female who presents to clinic today for the following health issue(s):  Patient presents with:  Consult: Discuss HSG     HPI:  Here today for discussion regarding HSG results from yesterday in workup for infertility.  Has been TTC x 2yrs.  Has had workup with several different providers including normal labs in 2022 and again in 2023.  Had normal pelvic us with our office in .  Normal periods.    Partner has fathered one pregnancy but has not done SA    HSG yesterday showed normal endometrial cavity but bilateral tubal occlusion.  Review of images shows proximal occlusion.    No hx of GC/Chlamydia.  No pelvic surgeries.  Denies painful periods or issues concerning for endometriosis      Patient's last menstrual period was 10/18/2023.    Patient is sexually active, .  Using none for contraception.    reports that she has never smoked. She has never used smokeless tobacco.    STD testing offered?  Declined    Health maintenance updated:  yes    Today's PHQ-2 Score:       2023    11:17 AM   PHQ-2 (  [employer])   Q1: Little interest or pleasure in doing things 0   Q2: Feeling down, depressed or hopeless 0   PHQ-2 Score 0     Today's PHQ-9 Score:       2023    11:17 AM   PHQ-9 SCORE   PHQ-9 Total Score 4     Today's TRAVIS-7 Score:       2023    11:17 AM   TRAVIS-7 SCORE   Total Score 3       Problem list and histories reviewed & adjusted, as indicated.  Additional history: as documented.    Patient Active Problem List   Diagnosis     Family history of liver failure     Acne vulgaris     Moderate recurrent major depression (H)     Helicobacter pylori gastritis     History reviewed. No pertinent surgical history.   Social History     Tobacco Use     Smoking status: Never     Smokeless tobacco: Never   Substance Use Topics     Alcohol use: Not Currently     Comment: occasional      Problem (# of  "Occurrences) Relation (Name,Age of Onset)    Asthma (2) Brother (Ellen), Brother    Diabetes (2) Mother, Brother (Ellen)    Breast Cancer (1) Maternal Aunt    Liver Disease (1) Mother: Liver transplant            Negative family history of: Coronary Artery Disease, Hypertension, Hyperlipidemia, Colon Cancer, Cerebrovascular Disease, Depression, Anxiety Disorder, Thyroid Disease, Seizure Disorder, Migraines              Current Outpatient Medications   Medication Sig     amphetamine-dextroamphetamine (ADDERALL) 15 MG tablet Take 15 mg by mouth 2 times daily     Prenatal Vit-DSS-Fe Cbn-FA (PRENACARE PO)      No current facility-administered medications for this visit.     No Known Allergies    ROS:  12 point review of systems negative other than symptoms noted below or in the HPI.  No urinary frequency or dysuria, bladder or kidney problems, Normal menstrual cycles      OBJECTIVE:     /82   Ht 1.6 m (5' 3\")   Wt 69.4 kg (153 lb)   LMP 10/18/2023   BMI 27.10 kg/m    Body mass index is 27.1 kg/m .    Exam:  Constitutional:  Appearance: Well nourished, well developed alert, in no acute distress  Neurologic:  Mental Status:  Oriented X3.  Normal strength and tone, sensory exam grossly normal, mentation intact and speech normal.    Psychiatric:  Mentation appears normal and affect normal/bright.     In-Clinic Test Results:  Results for orders placed or performed during the hospital encounter of 10/23/23 (from the past 24 hour(s))   XR Hysterosalpingogram    Narrative    XR HYSTEROSALPINGOGRAM RADIOLOGIST PERFORMS CATH 10/23/2023 1:53 PM     HISTORY: Fertility testing  TECHNIQUE: 0.8 minutes fluoroscopy used. 3 spot images obtained.  COMPARISON: None    FINDINGS: Informed consent obtained. Sterile speculum inserted. Cervix  was cleansed with Betadine. A 5 Syrian hysterosalpingography catheter  was advanced through the cervix. 5 mL Isovue-300 contrast utilized.    Normal endometrial cavity. Only the isthmus of the " fallopian tubes are  visualized on both sides. No free spillage into the peritoneal cavity.      Impression    IMPRESSION:  1.  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    IRVING XAVIER MD         SYSTEM ID:  [id number]       ASSESSMENT/PLAN:                                                        ICD-10-CM    1. Tubal occlusion  N97.1           Patient Instructions   Reviewed HSG results today in detail with Cindy.  With bilateral proximal tubal occlusion, will refer to infertility specialist for further discussion/management.  No obvious risk factors for tubal disease.      María Frye MD  [facility] [place]

## 2024-02-11 ENCOUNTER — HEALTH MAINTENANCE LETTER (OUTPATIENT)
Age: 24
End: 2024-02-11

## 2024-06-18 ENCOUNTER — OFFICE VISIT (OUTPATIENT)
Dept: OBGYN | Facility: CLINIC | Age: 24
End: 2024-06-18
Payer: COMMERCIAL

## 2024-06-18 VITALS
BODY MASS INDEX: 28.7 KG/M2 | HEART RATE: 94 BPM | OXYGEN SATURATION: 99 % | DIASTOLIC BLOOD PRESSURE: 78 MMHG | SYSTOLIC BLOOD PRESSURE: 115 MMHG | TEMPERATURE: 98.6 F | HEIGHT: 63 IN | WEIGHT: 162 LBS

## 2024-06-18 DIAGNOSIS — R10.2 PELVIC PAIN IN FEMALE: Primary | ICD-10-CM

## 2024-06-18 DIAGNOSIS — N93.9 VAGINAL BLEEDING: ICD-10-CM

## 2024-06-18 DIAGNOSIS — R30.0 DYSURIA: ICD-10-CM

## 2024-06-18 LAB
ALBUMIN UR-MCNC: NEGATIVE MG/DL
APPEARANCE UR: CLEAR
BACTERIA #/AREA URNS HPF: ABNORMAL /HPF
BILIRUB UR QL STRIP: NEGATIVE
COLOR UR AUTO: YELLOW
GLUCOSE UR STRIP-MCNC: NEGATIVE MG/DL
HCG UR QL: NEGATIVE
HGB UR QL STRIP: ABNORMAL
KETONES UR STRIP-MCNC: NEGATIVE MG/DL
LEUKOCYTE ESTERASE UR QL STRIP: ABNORMAL
NITRATE UR QL: NEGATIVE
PH UR STRIP: 6 [PH] (ref 5–7)
RBC #/AREA URNS AUTO: ABNORMAL /HPF
SP GR UR STRIP: 1.01 (ref 1–1.03)
SQUAMOUS #/AREA URNS AUTO: ABNORMAL /LPF
TRANS CELLS #/AREA URNS HPF: ABNORMAL /HPF
UROBILINOGEN UR STRIP-ACNC: 0.2 E.U./DL
WBC #/AREA URNS AUTO: ABNORMAL /HPF
WBC CLUMPS #/AREA URNS HPF: PRESENT /HPF

## 2024-06-18 PROCEDURE — 81025 URINE PREGNANCY TEST: CPT | Performed by: OBSTETRICS & GYNECOLOGY

## 2024-06-18 PROCEDURE — 99214 OFFICE O/P EST MOD 30 MIN: CPT | Performed by: OBSTETRICS & GYNECOLOGY

## 2024-06-18 PROCEDURE — 81001 URINALYSIS AUTO W/SCOPE: CPT | Performed by: OBSTETRICS & GYNECOLOGY

## 2024-06-18 RX ORDER — TAMSULOSIN HYDROCHLORIDE 0.4 MG/1
0.4 CAPSULE ORAL DAILY
Qty: 14 CAPSULE | Refills: 0 | Status: SHIPPED | OUTPATIENT
Start: 2024-06-18 | End: 2024-08-29

## 2024-06-18 RX ORDER — IBUPROFEN 600 MG/1
600 TABLET, FILM COATED ORAL EVERY 6 HOURS PRN
Qty: 40 TABLET | Refills: 0 | Status: SHIPPED | OUTPATIENT
Start: 2024-06-18

## 2024-06-18 RX ORDER — ONDANSETRON 4 MG/1
4 TABLET, ORALLY DISINTEGRATING ORAL EVERY 8 HOURS PRN
Qty: 20 TABLET | Refills: 0 | Status: SHIPPED | OUTPATIENT
Start: 2024-06-18 | End: 2024-08-29

## 2024-06-18 ASSESSMENT — ANXIETY QUESTIONNAIRES
7. FEELING AFRAID AS IF SOMETHING AWFUL MIGHT HAPPEN: NOT AT ALL
1. FEELING NERVOUS, ANXIOUS, OR ON EDGE: NOT AT ALL
GAD7 TOTAL SCORE: 2
3. WORRYING TOO MUCH ABOUT DIFFERENT THINGS: MORE THAN HALF THE DAYS
GAD7 TOTAL SCORE: 2
IF YOU CHECKED OFF ANY PROBLEMS ON THIS QUESTIONNAIRE, HOW DIFFICULT HAVE THESE PROBLEMS MADE IT FOR YOU TO DO YOUR WORK, TAKE CARE OF THINGS AT HOME, OR GET ALONG WITH OTHER PEOPLE: SOMEWHAT DIFFICULT
5. BEING SO RESTLESS THAT IT IS HARD TO SIT STILL: NOT AT ALL
2. NOT BEING ABLE TO STOP OR CONTROL WORRYING: NOT AT ALL
6. BECOMING EASILY ANNOYED OR IRRITABLE: NOT AT ALL

## 2024-06-18 ASSESSMENT — PATIENT HEALTH QUESTIONNAIRE - PHQ9
5. POOR APPETITE OR OVEREATING: NOT AT ALL
SUM OF ALL RESPONSES TO PHQ QUESTIONS 1-9: 5

## 2024-06-18 NOTE — PROGRESS NOTES
GYN Clinic Visit  Date of visit: 2024   Chief Complaint: pelvic pain    HPI:   Cindy Pena is a 24 year old  female who presents for evaluation of pelvic pain and hematuria.     Patient would like to discuss:  Abdominal pain. Started yesterday. Lower abdomen, all the way across. Radiates into back and arms. Feels like a tight pressure when she pees, has to pee slowly or else it hurt. Some blood in urine. No burning with urination, pain is more inside. Nausea, no vomiting. Fever and chills yesterday. H/o UTIs in the past, feels a little like that. Has never had a kidney stone. Pain is achy and feels nauseated right now. Has not taken any meds for pain, just drinking water. No abnormal vaginal discharge. No diarrhea, no constipation. No change in pain with bowel movements.   Some blood in toilet, when she wipes and tiny pink on pad. Patient's last menstrual period was 2024. Last period was late. UPT was neg.   Trying to conceive. HSG showed bilateral proximal tubal occlusion 10/23/23. Had the tubes opened with vascular interventional experts in [place].      Obstetric History:   OB History    Para Term  AB Living   0 0 0 0 0 0   SAB IAB Ectopic Multiple Live Births   0 0 0 0 0       Gynecologic History:  Patient's last menstrual period was 2024. Menses are regular  STI history: HSV2, only one outbreak  Last Pap: 22 NILM  History of abnormal pap: no  History of cervical procedures: no   Fertility goals: currently trying to conceive   Sexually active with male partners. Currently trying to conceive.   Concerns about sexual function: none      Past Medical History:  Past Medical History:   Diagnosis Date    Anxiety     History of malaria     HSV (herpes simplex virus) infection 2022    HSV-2 seropositive     Past Surgical History:  No past surgical history on file.    Medications:  Current Outpatient Medications   Medication Sig Dispense Refill    ibuprofen  "(ADVIL/MOTRIN) 600 MG tablet Take 1 tablet (600 mg) by mouth every 6 hours as needed for moderate pain 40 tablet 0    ondansetron (ZOFRAN ODT) 4 MG ODT tab Take 1 tablet (4 mg) by mouth every 8 hours as needed for nausea 20 tablet 0    tamsulosin (FLOMAX) 0.4 MG capsule Take 1 capsule (0.4 mg) by mouth daily 14 capsule 0    amphetamine-dextroamphetamine (ADDERALL) 15 MG tablet Take 15 mg by mouth 2 times daily      Prenatal Vit-DSS-Fe Cbn-FA (PRENACARE PO)        No current facility-administered medications for this visit.       Allergy:  No Known Allergies  Patient denies food, latex or environmental allergies.         Physical Exam:  Vitals:    06/18/24 0933   BP: 115/78   Pulse: 94   Temp: 98.6  F (37  C)   TempSrc: Oral   SpO2: 99%   Weight: 73.5 kg (162 lb)   Height: 1.6 m (5' 3\")     Estimated body mass index is 28.7 kg/m  as calculated from the following:    Height as of this encounter: 1.6 m (5' 3\").    Weight as of this encounter: 73.5 kg (162 lb).     Gen: healthy, alert, active, no distress  Abd: soft, +tender across entire lower abdomen, no rebound, no guarding, non-distended, no masses, no CVA tenderness  Extremities: nontender, no edema  :   - external genitalia: vulva and perineum are normal without lesion, mass or erythema  - urethra: well supported urethra, no hypermobility, normal Skenes and Bartholins.   - bladder: no tenderness, no masses  - vagina: intact, rugated mucosa without lesions or abnormal discharge.   - cervix: normal, no lesions or abnormal discharge.   - uterus: 6w size anteverted, no masses. Mild tenderness, no rebound or guarding.  - adnexa: no masses. Mild tendnerness bilaterally  - rectal: deferred   Latest Reference Range & Units 06/18/24 10:26   Color Urine Colorless, Straw, Light Yellow, Yellow  Yellow   Appearance Urine Clear  Clear   Glucose Urine Negative mg/dL Negative   Bilirubin Urine Negative  Negative   Ketones Urine Negative mg/dL Negative   Specific Gravity Urine " 1.003 - 1.035  1.010   pH Urine 5.0 - 7.0  6.0   Protein Albumin Urine Negative mg/dL Negative   Urobilinogen Urine 0.2, 1.0 E.U./dL 0.2   Nitrite Urine Negative  Negative   Blood Urine Negative  Large !   Leukocyte Esterase Urine Negative  Trace !   WBC Urine 0-5 /HPF /HPF 5-10 !   RBC Urine 0-2 /HPF /HPF 2-5 !   Bacteria Urine None Seen /HPF Few !   WBC Clumps None Seen /HPF Present !   Squamous Epithelial /LPF Urine None Seen /LPF Moderate !   Transitional Epi None Seen /HPF Few !   !: Data is abnormal    UPT neg    Assessment:  Cindy Pena is a 24 year old  who presents with pelvic pain and hematuria, concerning for nephrolithiasis    Plan:  1. Pelvic pain in female  Encouraged hydration, NSAIDs. Will try flomax. If no improvement of symptoms recommend CT later this week.   - CT Abdomen Pelvis w/o Contrast; Future  - tamsulosin (FLOMAX) 0.4 MG capsule; Take 1 capsule (0.4 mg) by mouth daily  Dispense: 14 capsule; Refill: 0  - ondansetron (ZOFRAN ODT) 4 MG ODT tab; Take 1 tablet (4 mg) by mouth every 8 hours as needed for nausea  Dispense: 20 tablet; Refill: 0  - ibuprofen (ADVIL/MOTRIN) 600 MG tablet; Take 1 tablet (600 mg) by mouth every 6 hours as needed for moderate pain  Dispense: 40 tablet; Refill: 0    2. Vaginal bleeding  Neg UPT  - HCG qualitative urine; Future  - HCG qualitative urine    3. Dysuria  No UTI  - UA with Microscopic reflex to Culture - lab collect; Future  - UA with Microscopic reflex to Culture - lab collect  - UA Microscopic with Reflex to Culture      Cindy Brantley MD

## 2024-06-22 ENCOUNTER — NURSE TRIAGE (OUTPATIENT)
Dept: NURSING | Facility: CLINIC | Age: 24
End: 2024-06-22
Payer: COMMERCIAL

## 2024-06-22 NOTE — TELEPHONE ENCOUNTER
Pt calling with concerns about;    Symptoms have worsened since office visit of 6/18/24  Frequency  Urgency    Denies;  Fever  Lower back pain  Lower abdominal pain/radiation of pain into back/arms  Blood in urine at time of this call    According to the protocol, patient should see a HCP within 24 hours.  Care advice given. Patient verbalizes understanding and plans to send taneshahart msg to Dr. Brantley about her labs/symptoms/office visit of 6/18 because Pt believes she does have a UTI.    Pt is encouraged to go to  if not heard from any response within 24 hours to address her UTI symptoms.    Julissa Phillips RN, Nurse Advisor 8:09 AM 6/22/2024  Reason for Disposition   Urinating more frequently than usual (i.e., frequency)    Additional Information   Negative: Shock suspected (e.g., cold/pale/clammy skin, too weak to stand, low BP, rapid pulse)   Negative: Sounds like a life-threatening emergency to the triager   Negative: Side (flank) or lower back pain present   Negative: [1] Decreased urination and [2] drinking very little AND [2] dehydration suspected (e.g., dark urine, no urine > 12 hours, very dry mouth, very lightheaded)   Negative: Patient sounds very sick or weak to the triager   Negative: Fever > 100.4 F (38.0 C)   Negative: Followed a female genital area injury (e.g., labia, vagina, vulva)   Negative: Followed a male genital area injury (e.g., penis, scrotum)   Negative: Vaginal discharge   Negative: [1] Unable to urinate (or only a few drops) > 4 hours AND [2] bladder feels very full (e.g., palpable bladder or strong urge to urinate)   Negative: Pus (white, yellow) or bloody discharge from end of penis   Negative: [1] Taking antibiotic for urinary tract infection (UTI) AND [2] female   Negative: [1] Taking antibiotic for urinary tract infection (UTI) AND [2] male   Negative: [1] Pain or burning with passing urine (urination) AND [2] pregnant   Negative: [1] Pain or burning with passing urine (urination)  AND [2] postpartum (from 0 to 6 weeks after delivery)   Negative: [1] Pain or burning with passing urine (urination) AND [2] female   Negative: [1] Pain or burning with passing urine (urination) AND [2] male   Negative: Pain or itching in the vulvar area   Negative: Pain in scrotum is main symptom   Negative: Blood in the urine is main symptom   Negative: Symptoms arising from use of a urinary catheter (e.g., Coude, Galvez)    Protocols used: Urinary Symptoms-A-AH

## 2024-08-02 DIAGNOSIS — R10.2 PELVIC PAIN IN FEMALE: ICD-10-CM

## 2024-08-02 RX ORDER — TAMSULOSIN HYDROCHLORIDE 0.4 MG/1
0.4 CAPSULE ORAL DAILY
Qty: 14 CAPSULE | Refills: 0 | OUTPATIENT
Start: 2024-08-02

## 2024-08-29 ENCOUNTER — OFFICE VISIT (OUTPATIENT)
Dept: INTERNAL MEDICINE | Facility: CLINIC | Age: 24
End: 2024-08-29
Payer: COMMERCIAL

## 2024-08-29 VITALS
SYSTOLIC BLOOD PRESSURE: 122 MMHG | RESPIRATION RATE: 16 BRPM | OXYGEN SATURATION: 100 % | WEIGHT: 167 LBS | TEMPERATURE: 98.4 F | HEART RATE: 87 BPM | DIASTOLIC BLOOD PRESSURE: 84 MMHG | BODY MASS INDEX: 28.51 KG/M2 | HEIGHT: 64 IN

## 2024-08-29 DIAGNOSIS — J02.9 PHARYNGITIS, UNSPECIFIED ETIOLOGY: Primary | ICD-10-CM

## 2024-08-29 PROBLEM — F33.1 MODERATE RECURRENT MAJOR DEPRESSION (H): Status: RESOLVED | Noted: 2022-07-14 | Resolved: 2024-08-29

## 2024-08-29 PROCEDURE — 99213 OFFICE O/P EST LOW 20 MIN: CPT | Performed by: INTERNAL MEDICINE

## 2024-08-29 RX ORDER — AZITHROMYCIN 250 MG/1
TABLET, FILM COATED ORAL
Qty: 6 TABLET | Refills: 0 | Status: SHIPPED | OUTPATIENT
Start: 2024-08-29 | End: 2024-09-03

## 2024-08-29 ASSESSMENT — ENCOUNTER SYMPTOMS: SORE THROAT: 1

## 2024-08-29 NOTE — PROGRESS NOTES
" ASSESSMENT AND PLAN:    Pharyngitis with cervical adenitis    Exam and history are consistent with left sided pharyngitis and mild left anterior cervical adenitis. Will treat with azithromycin 250mg, 2 today and one daily for 4 days, reassured follow up as needed. Discussed using ibuprofen and/or acetaminophen prn.      CHIEF COMPLAINT:  Throat and ear pain    HISTORY OF PRESENT ILLNESS:  Cindy Pena is a 24 year old female with a few days of throat discomfort and upper inner neck discomfort with an element of otalgia.  No fever, or cough or dysphagia.  She has been using pseudophed prn without much benefit.     Past Medical History:   Diagnosis Date    Anxiety     History of malaria     HSV (herpes simplex virus) infection 12/27/2022    HSV-2 seropositive     History   Smoking Status    Never   Smokeless Tobacco    Never     Family History   Problem Relation Age of Onset    Liver Disease Mother         Liver transplant     Diabetes Mother     Asthma Brother     Diabetes Brother     Asthma Brother     Breast Cancer Maternal Aunt     Coronary Artery Disease No family hx of     Hypertension No family hx of     Hyperlipidemia No family hx of     Colon Cancer No family hx of     Cerebrovascular Disease No family hx of     Depression No family hx of     Anxiety Disorder No family hx of     Thyroid Disease No family hx of     Seizure Disorder No family hx of     Migraines No family hx of      VITALS:  Vitals:    08/29/24 1443   BP: 122/84   BP Location: Left arm   Patient Position: Sitting   Cuff Size: Adult Regular   Pulse: 87   Resp: 16   Temp: 98.4  F (36.9  C)   TempSrc: Tympanic   SpO2: 100%   Weight: 75.8 kg (167 lb)   Height: 1.626 m (5' 4\")     Estimated body mass index is 28.67 kg/m  as calculated from the following:    Height as of this encounter: 1.626 m (5' 4\").    Weight as of this encounter: 75.8 kg (167 lb).  Wt Readings from Last 3 Encounters:   08/29/24 75.8 kg (167 lb)   06/18/24 73.5 kg (162 lb) "   10/24/23 69.4 kg (153 lb)     PHYSICAL EXAM:  Constitutional:  In NAD, alert and oriented  HEENT: nose clear, mild erythema posterior pharynx, L > R, without exudate, uvula midline,  Neck: mildly tender left anterior cervical adenopathy  Cardiac:  S1 S2   Lungs: Clear     Current Outpatient Medications   Medication Sig Dispense Refill    amphetamine-dextroamphetamine (ADDERALL) 15 MG tablet Take 15 mg by mouth 2 times daily      ibuprofen (ADVIL/MOTRIN) 600 MG tablet Take 1 tablet (600 mg) by mouth every 6 hours as needed for moderate pain 40 tablet 0    Prenatal Vit-DSS-Fe Cbn-FA (PRENACARE PO)        Kenny Richard MD  Internal Medicine  [facility]

## 2025-01-09 NOTE — PATIENT INSTRUCTIONS
Problem: Hemodialysis  Goal: Dialysis: Safe, effective, and comfortable hemodialysis treatment (Hemodialysis nurse only)  Outcome: Monitoring/Evaluating progress  Note: Patient completed his ordered treatment time of 3.5 hours for dialysis and tolerated 2 kg of fluid removal.   Goal: Dialysis: Free of complications related to initiation/termination of dialysis (Hemodialysis nurse only)  Outcome: Monitoring/Evaluating progress  Note: Patient was able to maintain a BFR of 400 during dialysis. HD line was capped off with sodium citrate per policy.      At [facility], we strive to deliver an exceptional experience to you, every time we see you.  If you receive a survey in the mail, please send us back your thoughts. We really do value your feedback.    Based on your medical history, these are the current health maintenance/preventive care services that you are due for (some may have been done at this visit.)  Health Maintenance Due   Topic Date Due     PREVENTIVE CARE VISIT  03/19/2015     PHQ-2  01/01/2019         Suggested websites for health information:  Www.[employer].org : Up to date and easily searchable information on multiple topics.  Www.[employer].gov : medication info, interactive tutorials, watch real surgeries online  Www.familydoctor.org : good info from the Academy of Family Physicians  Www.cdc.gov : public health info, travel advisories, epidemics (H1N1)  Www.aap.org : children's health info, normal development, vaccinations  Www.health.[facility].[place].us : [place] dept of health, public health issues in [place], N1N1    Your care team:                            Family Medicine Internal Medicine   MD Randall Espino MD Shantel Branch-Fleming, MD Katya Georgiev PA-C Nam Ho, MD Pediatrics   Kike Mojica PALAURA Aguilar, CNP MD Linda Huffman CNP, MD Deborah Mielke, MD Kim Thein, APRLOYD [facility]      Clinic hours: Monday - Thursday 7 am-7 pm; Fridays 7 am-5 pm.   Urgent care: Monday - Friday 11 am-9 pm; Saturday and Sunday 9 am-5 pm.  Pharmacy : Monday -Thursday 8 am-8 pm; Friday 8 am-6 pm; Saturday and Sunday 9 am-5 pm.     Clinic: [phone]   Pharmacy: [phone]      Nexplanon Removal Instructions.      You may remove the pressure bandage after 24 hours.  You may remove the small bandage in 3-5 days (allow the steri strips to fall off naturally in the shower).      Contact the clinic immediately if you develop any signs of infection such as warmth, redness, fever or  discharge from the area.

## 2025-02-24 DIAGNOSIS — Z31.41 FERTILITY TESTING: Primary | ICD-10-CM

## 2025-02-26 ENCOUNTER — HOSPITAL ENCOUNTER (OUTPATIENT)
Dept: GENERAL RADIOLOGY | Facility: CLINIC | Age: 25
Discharge: HOME | End: 2025-02-26
Attending: STUDENT IN AN ORGANIZED HEALTH CARE EDUCATION/TRAINING PROGRAM
Payer: COMMERCIAL

## 2025-02-26 ENCOUNTER — ANCILLARY ORDERS (OUTPATIENT)
Dept: OTHER | Facility: CLINIC | Age: 25
End: 2025-02-26

## 2025-02-26 DIAGNOSIS — Z31.41 FERTILITY TESTING: ICD-10-CM

## 2025-02-26 DIAGNOSIS — Z31.41 FERTILITY TESTING: Primary | ICD-10-CM

## 2025-02-26 PROCEDURE — 76000 FLUOROSCOPY <1 HR PHYS/QHP: CPT

## 2025-02-26 RX ORDER — IOPAMIDOL 612 MG/ML
50 INJECTION, SOLUTION INTRAVASCULAR ONCE
Status: DISCONTINUED | OUTPATIENT
Start: 2025-02-26 | End: 2025-02-26

## 2025-02-26 NOTE — DISCHARGE INSTRUCTIONS
HSG (Hysterosalpingogram) Discharge Instructions    Diet and medicines:  You may go back to your normal diet and medicines.  You may take Tylenol (acetaminophen) or Advil (ibuprofen).      Activity: You may go back to your normal routine.    Side effects:  Expect mild cramping today.  You may have bloody spotting or brown, sticky discharge for up to two days.    Other instructions:     Call your doctor if you have:  Hives.  Problems breathing.  Swelling.  Signs of infection, which include:  A fever over 101  F (38.3  C), taken under the tongue.  Unusual discharge from your vagina.  Pain in lower abdomen (belly).    Questions?   Call your hospital:   [facility] [phone]    Patient: ____________________________________    Instructor: __________________________________   Time: ________ Date: ____________

## 2025-03-10 ENCOUNTER — OFFICE VISIT (OUTPATIENT)
Dept: OBGYN | Facility: CLINIC | Age: 25
End: 2025-03-10
Payer: COMMERCIAL

## 2025-03-10 VITALS
TEMPERATURE: 98.3 F | DIASTOLIC BLOOD PRESSURE: 75 MMHG | HEIGHT: 63 IN | BODY MASS INDEX: 29.55 KG/M2 | RESPIRATION RATE: 16 BRPM | HEART RATE: 90 BPM | WEIGHT: 166.8 LBS | SYSTOLIC BLOOD PRESSURE: 128 MMHG

## 2025-03-10 DIAGNOSIS — Z00.00 ANNUAL PHYSICAL EXAM: Primary | ICD-10-CM

## 2025-03-10 LAB
CHOLEST SERPL-MCNC: 181 MG/DL
ERYTHROCYTE [DISTWIDTH] IN BLOOD BY AUTOMATED COUNT: 12.1 % (ref 10–15)
EST. AVERAGE GLUCOSE BLD GHB EST-MCNC: 111 MG/DL
FASTING STATUS PATIENT QL REPORTED: YES
HBA1C MFR BLD: 5.5 % (ref 0–5.6)
HCT VFR BLD AUTO: 36.5 % (ref 35–47)
HDLC SERPL-MCNC: 68 MG/DL
HGB BLD-MCNC: 12 G/DL (ref 11.7–15.7)
LDLC SERPL CALC-MCNC: 104 MG/DL
MCH RBC QN AUTO: 29.4 PG (ref 26.5–33)
MCHC RBC AUTO-ENTMCNC: 32.9 G/DL (ref 31.5–36.5)
MCV RBC AUTO: 90 FL (ref 78–100)
NONHDLC SERPL-MCNC: 113 MG/DL
PLATELET # BLD AUTO: 250 10E3/UL (ref 150–450)
RBC # BLD AUTO: 4.08 10E6/UL (ref 3.8–5.2)
TRIGL SERPL-MCNC: 44 MG/DL
TSH SERPL DL<=0.005 MIU/L-ACNC: 0.74 UIU/ML (ref 0.3–4.2)
VIT D+METAB SERPL-MCNC: 28 NG/ML (ref 20–50)
WBC # BLD AUTO: 5 10E3/UL (ref 4–11)

## 2025-03-10 PROCEDURE — 85027 COMPLETE CBC AUTOMATED: CPT | Performed by: OBSTETRICS & GYNECOLOGY

## 2025-03-10 PROCEDURE — 82306 VITAMIN D 25 HYDROXY: CPT | Performed by: OBSTETRICS & GYNECOLOGY

## 2025-03-10 PROCEDURE — 83036 HEMOGLOBIN GLYCOSYLATED A1C: CPT | Performed by: OBSTETRICS & GYNECOLOGY

## 2025-03-10 PROCEDURE — 36415 COLL VENOUS BLD VENIPUNCTURE: CPT | Performed by: OBSTETRICS & GYNECOLOGY

## 2025-03-10 PROCEDURE — 80061 LIPID PANEL: CPT | Performed by: OBSTETRICS & GYNECOLOGY

## 2025-03-10 PROCEDURE — 84443 ASSAY THYROID STIM HORMONE: CPT | Performed by: OBSTETRICS & GYNECOLOGY

## 2025-03-10 ASSESSMENT — PATIENT HEALTH QUESTIONNAIRE - PHQ9
10. IF YOU CHECKED OFF ANY PROBLEMS, HOW DIFFICULT HAVE THESE PROBLEMS MADE IT FOR YOU TO DO YOUR WORK, TAKE CARE OF THINGS AT HOME, OR GET ALONG WITH OTHER PEOPLE: SOMEWHAT DIFFICULT
SUM OF ALL RESPONSES TO PHQ QUESTIONS 1-9: 3
SUM OF ALL RESPONSES TO PHQ QUESTIONS 1-9: 3

## 2025-03-10 NOTE — PROGRESS NOTES
"Cindy is a 25 year old  female who presents for annual exam.     Menses are irregular and normal lasting 4 days.  Menses flow: normal.  No LMP recorded.. Using none for contraception.  She is currently considering pregnancy.  Besides routine health maintenance,  she would like to discuss fertlity.  GYNECOLOGIC HISTORY:  Menarche: 10  Age at first intercourse: 16   Number of lifetime partners: 6  Cindy is sexually active with male partner(s) and is currently in monogamous relationship with .    History sexually transmitted infections:No STD history  STI testing offered?  Declined  MELISSA exposure: Unknown  History of abnormal Pap smear: No - age 21-29 PAP every 3 years recommended  Family history of breast CA: No  Family history of uterine/ovarian CA: No    Family history of colon CA: No    HEALTH MAINTENANCE:  Cholesterol: (No results found for: \"CHOL\" History of abnormal lipids: No  Mammo: No . History of abnormal Mammo: Not applicable.  Regular Self Breast Exams: No  Calcium/Vitamin D intake: source:  dietary supplement(s) Adequate? Yes  TSH: (  TSH   Date Value Ref Range Status   2023 0.70 0.30 - 4.20 uIU/mL Final   2021 0.79 0.40 - 4.00 mU/L Final    )  Pap; (  Lab Results   Component Value Date    PAP NIL 2019    )    HISTORY:  OB History    Para Term  AB Living   0 0 0 0 0 0   SAB IAB Ectopic Multiple Live Births   0 0 0 0 0     Past Medical History:   Diagnosis Date    Anxiety     History of malaria     HSV (herpes simplex virus) infection 2022    HSV-2 seropositive     No past surgical history on file.  Family History   Problem Relation Age of Onset    Liver Disease Mother         Liver transplant     Diabetes Mother     Asthma Brother     Diabetes Brother     Asthma Brother     Breast Cancer Maternal Aunt     Coronary Artery Disease No family hx of     Hypertension No family hx of     Hyperlipidemia No family hx of     Colon Cancer No family hx of     " Cerebrovascular Disease No family hx of     Depression No family hx of     Anxiety Disorder No family hx of     Thyroid Disease No family hx of     Seizure Disorder No family hx of     Migraines No family hx of      Social History     Socioeconomic History    Marital status:      Spouse name: None    Number of children: 0    Years of education: None    Highest education level: None   Tobacco Use    Smoking status: Never     Passive exposure: Never    Smokeless tobacco: Never   Vaping Use    Vaping status: Never Used   Substance and Sexual Activity    Alcohol use: Yes     Comment: occasional    Drug use: No    Sexual activity: Yes     Partners: Male     Birth control/protection: None   Other Topics Concern    Parent/sibling w/ CABG, MI or angioplasty before 65F 55M? No     Social Drivers of Health     Interpersonal Safety: Low Risk  (8/29/2024)    Interpersonal Safety     Do you feel physically and emotionally safe where you currently live?: Yes     Within the past 12 months, have you been hit, slapped, kicked or otherwise physically hurt by someone?: No     Within the past 12 months, have you been humiliated or emotionally abused in other ways by your partner or ex-partner?: No       Current Outpatient Medications:     amphetamine-dextroamphetamine (ADDERALL) 15 MG tablet, Take 15 mg by mouth 2 times daily, Disp: , Rfl:     ibuprofen (ADVIL/MOTRIN) 600 MG tablet, Take 1 tablet (600 mg) by mouth every 6 hours as needed for moderate pain, Disp: 40 tablet, Rfl: 0    Prenatal Vit-DSS-Fe Cbn-FA (PRENACARE PO), , Disp: , Rfl:    No Known Allergies    Past medical, surgical, social and family history were reviewed and updated in EPIC.    ROS:   C:     NEGATIVE for fever, chills, change in weight  I:       NEGATIVE for worrisome rashes, moles or lesions  E:     NEGATIVE for vision changes or irritation  E/M: NEGATIVE for ear, mouth and throat problems  R:     NEGATIVE for significant cough or SOB  CV:   NEGATIVE for  "chest pain, palpitations or peripheral edema  GI:     NEGATIVE for nausea, abdominal pain, heartburn, or change in bowel habits  :   NEGATIVE for frequency, dysuria, hematuria, vaginal discharge, or irregular bleeding  M:     NEGATIVE for significant arthralgias or myalgia  N:      NEGATIVE for weakness, dizziness or paresthesias  E:      NEGATIVE for temperature intolerance, skin/hair changes  P:      NEGATIVE for changes in mood or affect.    EXAM:  There were no vitals taken for this visit.   BMI: There is no height or weight on file to calculate BMI.  Constitutional: healthy, alert and no distress  Head: Normocephalic. No masses, lesions, tenderness or abnormalities  Neck: Neck supple. Trachea midline. No adenopathy. Thyroid symmetric, normal size.   Cardiovascular: RRR.   Respiratory: Negative.   Breast: {GYN Breast:899430}  Gastrointestinal: Abdomen soft, non-tender, non-distended. No masses, organomegaly.  :  Vulva:  No external lesions, normal female hair distribution, no inguinal adenopathy.    Urethra:  Midline, non-tender, well supported, no discharge  Vagina:  Moist, pink, no abnormal discharge, no lesions  Uterus:  Normal size, *** , non-tender, freely mobile  Ovaries:  No masses appreciated, non-tender, mobile  Rectal Exam: {RECTAL EXAM:852490}  Musculoskeletal: extremities normal  Skin: no suspicious lesions or rashes  Psychiatric: Affect appropriate, cooperative,mentation appears normal.     COUNSELING:   {FEMALE COUNSELING MESSAGES:949306::\"Reviewed preventive health counseling, as reflected in patient instructions\"}   reports that she has never smoked. She has never been exposed to tobacco smoke. She has never used smokeless tobacco.  {Tobacco Cessation -- Delete if patient is a non-smoker:720139}  There is no height or weight on file to calculate BMI.  {Obesity Action Plan -- Delete if BMA < 25:001486}  FRAX Risk Assessment    ASSESSMENT:  25 year old female with satisfactory annual " exam  {[facility] PICKLIST:082870}

## 2025-08-29 ENCOUNTER — HOSPITAL ENCOUNTER (OUTPATIENT)
Facility: CLINIC | Age: 25
Discharge: HOME | End: 2025-08-29
Attending: OBSTETRICS & GYNECOLOGY | Admitting: OBSTETRICS & GYNECOLOGY
Payer: COMMERCIAL

## 2025-08-29 VITALS
SYSTOLIC BLOOD PRESSURE: 134 MMHG | OXYGEN SATURATION: 98 % | DIASTOLIC BLOOD PRESSURE: 82 MMHG | WEIGHT: 176.9 LBS | HEART RATE: 88 BPM | TEMPERATURE: 96.9 F | HEIGHT: 63 IN | RESPIRATION RATE: 16 BRPM | BODY MASS INDEX: 31.34 KG/M2

## 2025-08-29 DIAGNOSIS — Z98.890 S/P LAPAROSCOPY: Primary | ICD-10-CM

## 2025-08-29 LAB — HCG UR QL: NEGATIVE

## 2025-08-29 PROCEDURE — 250N000025 HC SEVOFLURANE, PER MIN: Performed by: OBSTETRICS & GYNECOLOGY

## 2025-08-29 PROCEDURE — 999N000141 HC STATISTIC PRE-PROCEDURE NURSING ASSESSMENT: Performed by: OBSTETRICS & GYNECOLOGY

## 2025-08-29 PROCEDURE — 360N000080 HC SURGERY LEVEL 7, PER MIN: Performed by: OBSTETRICS & GYNECOLOGY

## 2025-08-29 PROCEDURE — 710N000009 HC RECOVERY PHASE 1, LEVEL 1, PER MIN: Performed by: OBSTETRICS & GYNECOLOGY

## 2025-08-29 PROCEDURE — 81025 URINE PREGNANCY TEST: CPT | Performed by: ANESTHESIOLOGY

## 2025-08-29 PROCEDURE — 370N000017 HC ANESTHESIA TECHNICAL FEE, PER MIN: Performed by: OBSTETRICS & GYNECOLOGY

## 2025-08-29 PROCEDURE — 250N000013 HC RX MED GY IP 250 OP 250 PS 637: Performed by: OBSTETRICS & GYNECOLOGY

## 2025-08-29 PROCEDURE — 272N000001 HC OR GENERAL SUPPLY STERILE: Performed by: OBSTETRICS & GYNECOLOGY

## 2025-08-29 PROCEDURE — 258N000001 HC RX 258: Performed by: OBSTETRICS & GYNECOLOGY

## 2025-08-29 PROCEDURE — 88304 TISSUE EXAM BY PATHOLOGIST: CPT | Mod: TC | Performed by: OBSTETRICS & GYNECOLOGY

## 2025-08-29 PROCEDURE — 710N000012 HC RECOVERY PHASE 2, PER MINUTE: Performed by: OBSTETRICS & GYNECOLOGY

## 2025-08-29 PROCEDURE — 250N000011 HC RX IP 250 OP 636: Performed by: OBSTETRICS & GYNECOLOGY

## 2025-08-29 PROCEDURE — 250N000009 HC RX 250: Performed by: OBSTETRICS & GYNECOLOGY

## 2025-08-29 PROCEDURE — 250N000011 HC RX IP 250 OP 636: Performed by: ANESTHESIOLOGY

## 2025-08-29 PROCEDURE — 250N000013 HC RX MED GY IP 250 OP 250 PS 637: Performed by: ANESTHESIOLOGY

## 2025-08-29 RX ORDER — LIDOCAINE 40 MG/G
CREAM TOPICAL
Status: DISCONTINUED | OUTPATIENT
Start: 2025-08-29 | End: 2025-08-29 | Stop reason: HOSPADM

## 2025-08-29 RX ORDER — HYDROMORPHONE HYDROCHLORIDE 1 MG/ML
0.2 INJECTION, SOLUTION INTRAMUSCULAR; INTRAVENOUS; SUBCUTANEOUS EVERY 5 MIN PRN
Status: DISCONTINUED | OUTPATIENT
Start: 2025-08-29 | End: 2025-08-29 | Stop reason: HOSPADM

## 2025-08-29 RX ORDER — NALOXONE HYDROCHLORIDE 0.4 MG/ML
0.1 INJECTION, SOLUTION INTRAMUSCULAR; INTRAVENOUS; SUBCUTANEOUS
Status: DISCONTINUED | OUTPATIENT
Start: 2025-08-29 | End: 2025-08-29 | Stop reason: HOSPADM

## 2025-08-29 RX ORDER — SODIUM CHLORIDE, SODIUM LACTATE, POTASSIUM CHLORIDE, CALCIUM CHLORIDE 600; 310; 30; 20 MG/100ML; MG/100ML; MG/100ML; MG/100ML
INJECTION, SOLUTION INTRAVENOUS CONTINUOUS
Status: DISCONTINUED | OUTPATIENT
Start: 2025-08-29 | End: 2025-08-29 | Stop reason: HOSPADM

## 2025-08-29 RX ORDER — HYDROMORPHONE HYDROCHLORIDE 1 MG/ML
0.4 INJECTION, SOLUTION INTRAMUSCULAR; INTRAVENOUS; SUBCUTANEOUS EVERY 5 MIN PRN
Status: DISCONTINUED | OUTPATIENT
Start: 2025-08-29 | End: 2025-08-29 | Stop reason: HOSPADM

## 2025-08-29 RX ORDER — MAGNESIUM HYDROXIDE 1200 MG/15ML
LIQUID ORAL PRN
Status: DISCONTINUED | OUTPATIENT
Start: 2025-08-29 | End: 2025-08-29 | Stop reason: HOSPADM

## 2025-08-29 RX ORDER — IBUPROFEN 400 MG/1
800 TABLET, FILM COATED ORAL ONCE
Status: DISCONTINUED | OUTPATIENT
Start: 2025-08-29 | End: 2025-08-29 | Stop reason: HOSPADM

## 2025-08-29 RX ORDER — DEXAMETHASONE SODIUM PHOSPHATE 4 MG/ML
4 INJECTION, SOLUTION INTRA-ARTICULAR; INTRALESIONAL; INTRAMUSCULAR; INTRAVENOUS; SOFT TISSUE
Status: DISCONTINUED | OUTPATIENT
Start: 2025-08-29 | End: 2025-08-29 | Stop reason: HOSPADM

## 2025-08-29 RX ORDER — BUPIVACAINE HYDROCHLORIDE 2.5 MG/ML
INJECTION, SOLUTION INFILTRATION; PERINEURAL PRN
Status: DISCONTINUED | OUTPATIENT
Start: 2025-08-29 | End: 2025-08-29 | Stop reason: HOSPADM

## 2025-08-29 RX ORDER — ACETAMINOPHEN 325 MG/1
975 TABLET ORAL ONCE
Status: DISCONTINUED | OUTPATIENT
Start: 2025-08-29 | End: 2025-08-29 | Stop reason: HOSPADM

## 2025-08-29 RX ORDER — ONDANSETRON 4 MG/1
4 TABLET, ORALLY DISINTEGRATING ORAL EVERY 30 MIN PRN
Status: DISCONTINUED | OUTPATIENT
Start: 2025-08-29 | End: 2025-08-29 | Stop reason: HOSPADM

## 2025-08-29 RX ORDER — OXYCODONE HYDROCHLORIDE 5 MG/1
5 TABLET ORAL
Status: COMPLETED | OUTPATIENT
Start: 2025-08-29 | End: 2025-08-29

## 2025-08-29 RX ORDER — OXYCODONE HYDROCHLORIDE 5 MG/1
5-10 TABLET ORAL EVERY 4 HOURS PRN
Qty: 10 TABLET | Refills: 0 | Status: SHIPPED | OUTPATIENT
Start: 2025-08-29

## 2025-08-29 RX ORDER — ONDANSETRON 2 MG/ML
4 INJECTION INTRAMUSCULAR; INTRAVENOUS EVERY 30 MIN PRN
Status: DISCONTINUED | OUTPATIENT
Start: 2025-08-29 | End: 2025-08-29 | Stop reason: HOSPADM

## 2025-08-29 RX ORDER — FENTANYL CITRATE 50 UG/ML
50 INJECTION, SOLUTION INTRAMUSCULAR; INTRAVENOUS EVERY 5 MIN PRN
Status: DISCONTINUED | OUTPATIENT
Start: 2025-08-29 | End: 2025-08-29 | Stop reason: HOSPADM

## 2025-08-29 RX ORDER — OXYCODONE HYDROCHLORIDE 5 MG/1
10 TABLET ORAL
Status: DISCONTINUED | OUTPATIENT
Start: 2025-08-29 | End: 2025-08-29 | Stop reason: HOSPADM

## 2025-08-29 RX ORDER — FENTANYL CITRATE 50 UG/ML
25 INJECTION, SOLUTION INTRAMUSCULAR; INTRAVENOUS EVERY 5 MIN PRN
Status: DISCONTINUED | OUTPATIENT
Start: 2025-08-29 | End: 2025-08-29 | Stop reason: HOSPADM

## 2025-08-29 RX ORDER — ACETAMINOPHEN 325 MG/1
975 TABLET ORAL ONCE
Status: COMPLETED | OUTPATIENT
Start: 2025-08-29 | End: 2025-08-29

## 2025-08-29 RX ADMIN — OXYCODONE HYDROCHLORIDE 5 MG: 5 TABLET ORAL at 10:44

## 2025-08-29 RX ADMIN — ACETAMINOPHEN 975 MG: 325 TABLET ORAL at 06:35

## 2025-08-29 RX ADMIN — FENTANYL CITRATE 25 MCG: 50 INJECTION, SOLUTION INTRAMUSCULAR; INTRAVENOUS at 10:22

## 2025-08-29 RX ADMIN — FENTANYL CITRATE 25 MCG: 50 INJECTION, SOLUTION INTRAMUSCULAR; INTRAVENOUS at 10:09

## 2025-08-29 ASSESSMENT — ACTIVITIES OF DAILY LIVING (ADL)
ADLS_ACUITY_SCORE: 41

## 2025-09-03 LAB
PATH REPORT.COMMENTS IMP SPEC: NORMAL
PATH REPORT.COMMENTS IMP SPEC: NORMAL
PATH REPORT.FINAL DX SPEC: NORMAL
PATH REPORT.GROSS SPEC: NORMAL
PATH REPORT.MICROSCOPIC SPEC OTHER STN: NORMAL
PATH REPORT.RELEVANT HX SPEC: NORMAL
PHOTO IMAGE: NORMAL

## (undated) DEVICE — PROTECTOR ARM LG FOAM TRENDELENBURG TAP200

## (undated) DEVICE — KIT POSITIONER NUBLUE XL TRND CHST PD BD STRAP TP3000E-S-NB

## (undated) DEVICE — DRSG TEGADERM 2 3/8X2 3/4" 1624W

## (undated) DEVICE — DAVINCI HOT SHEARS TIP COVER  400180

## (undated) DEVICE — UTERINE MANIPULATOR RUMI 5.1MMX6CM UML516

## (undated) DEVICE — SYR 50ML LL W/O NDL 309653

## (undated) DEVICE — SU MONOCRYL 4-0 PS-2 18" UND Y496G

## (undated) DEVICE — DAVINCI XI SEAL UNIVERSAL 5-12MM 470500

## (undated) DEVICE — ESU GROUND PAD ADULT REM W/15' CORD E7507DB

## (undated) DEVICE — Device

## (undated) DEVICE — NDL INSUFFLATION 13GA 120MM C2201

## (undated) DEVICE — PACK DAVINCI GYN SMA15GDFS1

## (undated) DEVICE — LINEN TOWEL PACK X5 5464

## (undated) DEVICE — CLEANER INST PRE-KLENZ SOAK SHIELD TUBE 6 ML MEDIUM 2D66J4

## (undated) DEVICE — DRSG GAUZE 2X2" 8042

## (undated) DEVICE — TUBING IV EXTENSION SET ANESTHESIA 34" MLL 2C6227

## (undated) DEVICE — APPLICATOR ENDOSCOPIC 5 SURGICEL POWDER 3123SPEA

## (undated) DEVICE — SOL NACL 0.9% INJ 1000ML BAG 2B1324X

## (undated) DEVICE — SOL NACL 0.9% IRRIG 1000ML BOTTLE 2F7124

## (undated) DEVICE — SOL WATER IRRIG 1000ML BOTTLE 2F7114

## (undated) DEVICE — DAVINCI XI OBTURATOR BLADELESS 8MM 470359

## (undated) DEVICE — BNDG ABDOMINAL BINDER 9X30-45" 79-89070

## (undated) DEVICE — SUCTION IRR STRYKERFLOW II W/TIP 250-070-520

## (undated) DEVICE — ANTIFOG SOLUTION SEE SHARP 150M TROCAR SWABS 30978 (COI)

## (undated) DEVICE — DAVINCI XI DRAPE COLUMN 470341

## (undated) DEVICE — SURGICEL POWDER ABSORBABLE HEMOSTAT 3GM 3013SP

## (undated) DEVICE — TUBING CONMED AIRSEAL SMOKE EVAC INSUFFLATION ASM-EVAC

## (undated) DEVICE — ENDO TROCAR CONMED AIRSEAL BLADELESS 08X120MM IAS8-120LP

## (undated) DEVICE — DAVINCI XI DRAPE ARM 470015

## (undated) DEVICE — GLOVE PROTEXIS BLUE W/NEU-THERA 7.0  2D73EB70

## (undated) RX ORDER — FENTANYL CITRATE 0.05 MG/ML
INJECTION, SOLUTION INTRAMUSCULAR; INTRAVENOUS
Status: DISPENSED
Start: 2025-08-29

## (undated) RX ORDER — HYDROMORPHONE HYDROCHLORIDE 1 MG/ML
INJECTION, SOLUTION INTRAMUSCULAR; INTRAVENOUS; SUBCUTANEOUS
Status: DISPENSED
Start: 2025-08-29

## (undated) RX ORDER — OXYCODONE HYDROCHLORIDE 5 MG/1
TABLET ORAL
Status: DISPENSED
Start: 2025-08-29

## (undated) RX ORDER — BUPIVACAINE HYDROCHLORIDE AND EPINEPHRINE 2.5; 5 MG/ML; UG/ML
INJECTION, SOLUTION EPIDURAL; INFILTRATION; INTRACAUDAL; PERINEURAL
Status: DISPENSED
Start: 2025-08-29

## (undated) RX ORDER — BUPIVACAINE HYDROCHLORIDE 2.5 MG/ML
INJECTION, SOLUTION EPIDURAL; INFILTRATION; INTRACAUDAL; PERINEURAL
Status: DISPENSED
Start: 2025-08-29

## (undated) RX ORDER — FENTANYL CITRATE 50 UG/ML
INJECTION, SOLUTION INTRAMUSCULAR; INTRAVENOUS
Status: DISPENSED
Start: 2025-08-29

## (undated) RX ORDER — PROPOFOL 10 MG/ML
INJECTION, EMULSION INTRAVENOUS
Status: DISPENSED
Start: 2025-08-29

## (undated) RX ORDER — ACETAMINOPHEN 325 MG/1
TABLET ORAL
Status: DISPENSED
Start: 2025-08-29

## (undated) RX ORDER — GLYCOPYRROLATE 0.2 MG/ML
INJECTION, SOLUTION INTRAMUSCULAR; INTRAVENOUS
Status: DISPENSED
Start: 2025-08-29

## (undated) RX ORDER — DEXAMETHASONE SODIUM PHOSPHATE 4 MG/ML
INJECTION, SOLUTION INTRA-ARTICULAR; INTRALESIONAL; INTRAMUSCULAR; INTRAVENOUS; SOFT TISSUE
Status: DISPENSED
Start: 2025-08-29

## (undated) RX ORDER — ONDANSETRON 2 MG/ML
INJECTION INTRAMUSCULAR; INTRAVENOUS
Status: DISPENSED
Start: 2025-08-29